# Patient Record
Sex: FEMALE | Race: WHITE | NOT HISPANIC OR LATINO | Employment: OTHER | URBAN - METROPOLITAN AREA
[De-identification: names, ages, dates, MRNs, and addresses within clinical notes are randomized per-mention and may not be internally consistent; named-entity substitution may affect disease eponyms.]

---

## 2017-03-31 ENCOUNTER — RECORDS - HEALTHEAST (OUTPATIENT)
Dept: LAB | Facility: CLINIC | Age: 72
End: 2017-03-31

## 2017-03-31 LAB
CHOLEST SERPL-MCNC: 163 MG/DL
FASTING STATUS PATIENT QL REPORTED: YES
HDLC SERPL-MCNC: 69 MG/DL
LDLC SERPL CALC-MCNC: 81 MG/DL
TRIGL SERPL-MCNC: 64 MG/DL

## 2017-04-26 ENCOUNTER — RECORDS - HEALTHEAST (OUTPATIENT)
Dept: ADMINISTRATIVE | Facility: OTHER | Age: 72
End: 2017-04-26

## 2017-05-23 ENCOUNTER — HOSPITAL ENCOUNTER (OUTPATIENT)
Dept: NUCLEAR MEDICINE | Facility: CLINIC | Age: 72
Discharge: HOME | End: 2017-05-23

## 2017-05-23 ENCOUNTER — COMMUNICATION - HEALTHEAST (OUTPATIENT)
Dept: TELEHEALTH | Facility: CLINIC | Age: 72
End: 2017-05-23

## 2017-05-23 ENCOUNTER — HOSPITAL ENCOUNTER (OUTPATIENT)
Dept: CARDIOLOGY | Facility: CLINIC | Age: 72
Discharge: HOME | End: 2017-05-23

## 2017-05-23 DIAGNOSIS — R07.9 CHEST PAIN: ICD-10-CM

## 2017-05-23 LAB
CV STRESS CURRENT BP HE: NORMAL
CV STRESS CURRENT HR HE: 100
CV STRESS CURRENT HR HE: 100
CV STRESS CURRENT HR HE: 104
CV STRESS CURRENT HR HE: 106
CV STRESS CURRENT HR HE: 108
CV STRESS CURRENT HR HE: 110
CV STRESS CURRENT HR HE: 111
CV STRESS CURRENT HR HE: 115
CV STRESS CURRENT HR HE: 116
CV STRESS CURRENT HR HE: 116
CV STRESS CURRENT HR HE: 118
CV STRESS CURRENT HR HE: 123
CV STRESS CURRENT HR HE: 126
CV STRESS CURRENT HR HE: 127
CV STRESS CURRENT HR HE: 128
CV STRESS CURRENT HR HE: 89
CV STRESS CURRENT HR HE: 98
CV STRESS CURRENT HR HE: 99
CV STRESS DEVIATION TIME HE: NORMAL
CV STRESS ECHO PERCENT HR HE: NORMAL
CV STRESS EXERCISE STAGE HE: NORMAL
CV STRESS FINAL RESTING BP HE: NORMAL
CV STRESS FINAL RESTING HR HE: 106
CV STRESS MAX HR HE: 128
CV STRESS MAX TREADMILL GRADE HE: 0
CV STRESS MAX TREADMILL SPEED HE: 1
CV STRESS PEAK DIA BP HE: NORMAL
CV STRESS PEAK SYS BP HE: NORMAL
CV STRESS PHASE HE: NORMAL
CV STRESS PROTOCOL HE: NORMAL
CV STRESS RESTING PT POSITION HE: NORMAL
CV STRESS RESTING PT POSITION HE: NORMAL
CV STRESS ST DEVIATION AMOUNT HE: NORMAL
CV STRESS ST DEVIATION ELEVATION HE: NORMAL
CV STRESS ST EVELATION AMOUNT HE: NORMAL
CV STRESS TEST TYPE HE: NORMAL
CV STRESS TOTAL STAGE TIME MIN 1 HE: NORMAL
NUC STRESS EJECTION FRACTION: 72 %
STRESS ECHO BASELINE BP: NORMAL
STRESS ECHO BASELINE HR: 100
STRESS ECHO CALCULATED PERCENT HR: 86 %
STRESS ECHO LAST STRESS BP: NORMAL
STRESS ECHO LAST STRESS HR: 128

## 2017-08-02 ENCOUNTER — RECORDS - HEALTHEAST (OUTPATIENT)
Dept: ADMINISTRATIVE | Facility: OTHER | Age: 72
End: 2017-08-02

## 2017-08-03 ENCOUNTER — HOSPITAL ENCOUNTER (OUTPATIENT)
Dept: RADIOLOGY | Facility: CLINIC | Age: 72
Discharge: HOME | End: 2017-08-03

## 2017-08-03 DIAGNOSIS — S99.929A FOOT TRAUMA: ICD-10-CM

## 2017-08-10 ENCOUNTER — HOSPITAL ENCOUNTER (OUTPATIENT)
Dept: MRI IMAGING | Facility: CLINIC | Age: 72
Discharge: HOME | End: 2017-08-10

## 2017-08-10 DIAGNOSIS — M54.9 BACK PAIN: ICD-10-CM

## 2017-10-02 ENCOUNTER — RECORDS - HEALTHEAST (OUTPATIENT)
Dept: LAB | Facility: CLINIC | Age: 72
End: 2017-10-02

## 2017-10-02 LAB
CHOLEST SERPL-MCNC: 175 MG/DL
FASTING STATUS PATIENT QL REPORTED: YES
HDLC SERPL-MCNC: 65 MG/DL
LDLC SERPL CALC-MCNC: 96 MG/DL
TRIGL SERPL-MCNC: 69 MG/DL

## 2018-04-02 ENCOUNTER — RECORDS - HEALTHEAST (OUTPATIENT)
Dept: LAB | Facility: CLINIC | Age: 73
End: 2018-04-02

## 2018-04-02 LAB
ALBUMIN SERPL-MCNC: 3.9 G/DL (ref 3.5–5)
ALP SERPL-CCNC: 72 U/L (ref 45–120)
ALT SERPL W P-5'-P-CCNC: 22 U/L (ref 0–45)
ANION GAP SERPL CALCULATED.3IONS-SCNC: 9 MMOL/L (ref 5–18)
AST SERPL W P-5'-P-CCNC: 22 U/L (ref 0–40)
BASOPHILS # BLD AUTO: 0 THOU/UL (ref 0–0.2)
BASOPHILS NFR BLD AUTO: 0 % (ref 0–2)
BILIRUB SERPL-MCNC: 0.8 MG/DL (ref 0–1)
BUN SERPL-MCNC: 12 MG/DL (ref 8–28)
CALCIUM SERPL-MCNC: 9.9 MG/DL (ref 8.5–10.5)
CHLORIDE BLD-SCNC: 99 MMOL/L (ref 98–107)
CHOLEST SERPL-MCNC: 165 MG/DL
CO2 SERPL-SCNC: 27 MMOL/L (ref 22–31)
CREAT SERPL-MCNC: 0.65 MG/DL (ref 0.6–1.1)
EOSINOPHIL # BLD AUTO: 0.1 THOU/UL (ref 0–0.4)
EOSINOPHIL NFR BLD AUTO: 1 % (ref 0–6)
ERYTHROCYTE [DISTWIDTH] IN BLOOD BY AUTOMATED COUNT: 13.1 % (ref 11–14.5)
FASTING STATUS PATIENT QL REPORTED: YES
GFR SERPL CREATININE-BSD FRML MDRD: >60 ML/MIN/1.73M2
GLUCOSE BLD-MCNC: 89 MG/DL (ref 70–125)
HCT VFR BLD AUTO: 42.2 % (ref 35–47)
HDLC SERPL-MCNC: 77 MG/DL
HGB BLD-MCNC: 14 G/DL (ref 12–16)
LDLC SERPL CALC-MCNC: 69 MG/DL
LYMPHOCYTES # BLD AUTO: 2.1 THOU/UL (ref 0.8–4.4)
LYMPHOCYTES NFR BLD AUTO: 26 % (ref 20–40)
MCH RBC QN AUTO: 33.7 PG (ref 27–34)
MCHC RBC AUTO-ENTMCNC: 33.2 G/DL (ref 32–36)
MCV RBC AUTO: 102 FL (ref 80–100)
MONOCYTES # BLD AUTO: 0.6 THOU/UL (ref 0–0.9)
MONOCYTES NFR BLD AUTO: 7 % (ref 2–10)
NEUTROPHILS # BLD AUTO: 5.1 THOU/UL (ref 2–7.7)
NEUTROPHILS NFR BLD AUTO: 65 % (ref 50–70)
PLATELET # BLD AUTO: 265 THOU/UL (ref 140–440)
PMV BLD AUTO: 11.3 FL (ref 8.5–12.5)
POTASSIUM BLD-SCNC: 4.2 MMOL/L (ref 3.5–5)
PROT SERPL-MCNC: 6.8 G/DL (ref 6–8)
RBC # BLD AUTO: 4.15 MILL/UL (ref 3.8–5.4)
SODIUM SERPL-SCNC: 135 MMOL/L (ref 136–145)
TRIGL SERPL-MCNC: 93 MG/DL
WBC: 7.9 THOU/UL (ref 4–11)

## 2018-04-11 ENCOUNTER — HOSPITAL ENCOUNTER (OUTPATIENT)
Dept: MAMMOGRAPHY | Facility: CLINIC | Age: 73
Discharge: HOME | End: 2018-04-11

## 2018-04-11 DIAGNOSIS — Z12.31 VISIT FOR SCREENING MAMMOGRAM: ICD-10-CM

## 2018-10-03 ENCOUNTER — RECORDS - HEALTHEAST (OUTPATIENT)
Dept: LAB | Facility: CLINIC | Age: 73
End: 2018-10-03

## 2018-10-03 LAB
ALBUMIN SERPL-MCNC: 4 G/DL (ref 3.5–5)
ALP SERPL-CCNC: 62 U/L (ref 45–120)
ALT SERPL W P-5'-P-CCNC: 25 U/L (ref 0–45)
ANION GAP SERPL CALCULATED.3IONS-SCNC: 12 MMOL/L (ref 5–18)
AST SERPL W P-5'-P-CCNC: 22 U/L (ref 0–40)
BILIRUB SERPL-MCNC: 0.5 MG/DL (ref 0–1)
BUN SERPL-MCNC: 14 MG/DL (ref 8–28)
CALCIUM SERPL-MCNC: 10.2 MG/DL (ref 8.5–10.5)
CHLORIDE BLD-SCNC: 99 MMOL/L (ref 98–107)
CHOLEST SERPL-MCNC: 165 MG/DL
CO2 SERPL-SCNC: 24 MMOL/L (ref 22–31)
CREAT SERPL-MCNC: 0.68 MG/DL (ref 0.6–1.1)
FASTING STATUS PATIENT QL REPORTED: NORMAL
GFR SERPL CREATININE-BSD FRML MDRD: >60 ML/MIN/1.73M2
GLUCOSE BLD-MCNC: 84 MG/DL (ref 70–125)
HDLC SERPL-MCNC: 75 MG/DL
LDLC SERPL CALC-MCNC: 76 MG/DL
POTASSIUM BLD-SCNC: 4.1 MMOL/L (ref 3.5–5)
PROT SERPL-MCNC: 6.5 G/DL (ref 6–8)
SODIUM SERPL-SCNC: 135 MMOL/L (ref 136–145)
TRIGL SERPL-MCNC: 70 MG/DL

## 2019-04-03 ENCOUNTER — RECORDS - HEALTHEAST (OUTPATIENT)
Dept: LAB | Facility: CLINIC | Age: 74
End: 2019-04-03

## 2019-04-03 LAB
ALBUMIN SERPL-MCNC: 4.1 G/DL (ref 3.5–5)
ALP SERPL-CCNC: 63 U/L (ref 45–120)
ALT SERPL W P-5'-P-CCNC: 28 U/L (ref 0–45)
ANION GAP SERPL CALCULATED.3IONS-SCNC: 10 MMOL/L (ref 5–18)
AST SERPL W P-5'-P-CCNC: 21 U/L (ref 0–40)
BILIRUB SERPL-MCNC: 0.6 MG/DL (ref 0–1)
BUN SERPL-MCNC: 9 MG/DL (ref 8–28)
CALCIUM SERPL-MCNC: 10.3 MG/DL (ref 8.5–10.5)
CHLORIDE BLD-SCNC: 100 MMOL/L (ref 98–107)
CHOLEST SERPL-MCNC: 171 MG/DL
CO2 SERPL-SCNC: 26 MMOL/L (ref 22–31)
CREAT SERPL-MCNC: 0.69 MG/DL (ref 0.6–1.1)
FASTING STATUS PATIENT QL REPORTED: YES
GFR SERPL CREATININE-BSD FRML MDRD: >60 ML/MIN/1.73M2
GLUCOSE BLD-MCNC: 89 MG/DL (ref 70–125)
HDLC SERPL-MCNC: 82 MG/DL
LDLC SERPL CALC-MCNC: 71 MG/DL
POTASSIUM BLD-SCNC: 4.4 MMOL/L (ref 3.5–5)
PROT SERPL-MCNC: 6.6 G/DL (ref 6–8)
SODIUM SERPL-SCNC: 136 MMOL/L (ref 136–145)
TRIGL SERPL-MCNC: 90 MG/DL
TSH SERPL DL<=0.005 MIU/L-ACNC: 1.57 UIU/ML (ref 0.3–5)

## 2019-10-07 ENCOUNTER — RECORDS - HEALTHEAST (OUTPATIENT)
Dept: LAB | Facility: CLINIC | Age: 74
End: 2019-10-07

## 2019-10-07 LAB
ALBUMIN SERPL-MCNC: 4.4 G/DL (ref 3.5–5)
ALP SERPL-CCNC: 73 U/L (ref 45–120)
ALT SERPL W P-5'-P-CCNC: 33 U/L (ref 0–45)
AST SERPL W P-5'-P-CCNC: 32 U/L (ref 0–40)
BILIRUB DIRECT SERPL-MCNC: 0.2 MG/DL
BILIRUB SERPL-MCNC: 0.4 MG/DL (ref 0–1)
CHOLEST SERPL-MCNC: 173 MG/DL
FASTING STATUS PATIENT QL REPORTED: YES
HDLC SERPL-MCNC: 84 MG/DL
LDLC SERPL CALC-MCNC: 70 MG/DL
PROT SERPL-MCNC: 7.3 G/DL (ref 6–8)
TRIGL SERPL-MCNC: 97 MG/DL
TSH SERPL DL<=0.005 MIU/L-ACNC: 1.55 UIU/ML (ref 0.3–5)

## 2020-04-06 ENCOUNTER — RECORDS - HEALTHEAST (OUTPATIENT)
Dept: LAB | Facility: CLINIC | Age: 75
End: 2020-04-06

## 2020-04-06 LAB
ALBUMIN SERPL-MCNC: 4.1 G/DL (ref 3.5–5)
ALP SERPL-CCNC: 50 U/L (ref 45–120)
ALT SERPL W P-5'-P-CCNC: 24 U/L (ref 0–45)
ANION GAP SERPL CALCULATED.3IONS-SCNC: 11 MMOL/L (ref 5–18)
AST SERPL W P-5'-P-CCNC: 24 U/L (ref 0–40)
BASOPHILS # BLD AUTO: 0 THOU/UL (ref 0–0.2)
BASOPHILS NFR BLD AUTO: 0 % (ref 0–2)
BILIRUB SERPL-MCNC: 0.7 MG/DL (ref 0–1)
BUN SERPL-MCNC: 6 MG/DL (ref 8–28)
CALCIUM SERPL-MCNC: 9.8 MG/DL (ref 8.5–10.5)
CHLORIDE BLD-SCNC: 92 MMOL/L (ref 98–107)
CHOLEST SERPL-MCNC: 145 MG/DL
CO2 SERPL-SCNC: 24 MMOL/L (ref 22–31)
CREAT SERPL-MCNC: 0.67 MG/DL (ref 0.6–1.1)
EOSINOPHIL # BLD AUTO: 0 THOU/UL (ref 0–0.4)
EOSINOPHIL NFR BLD AUTO: 0 % (ref 0–6)
ERYTHROCYTE [DISTWIDTH] IN BLOOD BY AUTOMATED COUNT: 13.1 % (ref 11–14.5)
FASTING STATUS PATIENT QL REPORTED: YES
GFR SERPL CREATININE-BSD FRML MDRD: >60 ML/MIN/1.73M2
GLUCOSE BLD-MCNC: 99 MG/DL (ref 70–125)
HCT VFR BLD AUTO: 40 % (ref 35–47)
HDLC SERPL-MCNC: 64 MG/DL
HGB BLD-MCNC: 13.6 G/DL (ref 12–16)
LDLC SERPL CALC-MCNC: 69 MG/DL
LYMPHOCYTES # BLD AUTO: 2 THOU/UL (ref 0.8–4.4)
LYMPHOCYTES NFR BLD AUTO: 27 % (ref 20–40)
MCH RBC QN AUTO: 33 PG (ref 27–34)
MCHC RBC AUTO-ENTMCNC: 34 G/DL (ref 32–36)
MCV RBC AUTO: 97 FL (ref 80–100)
MONOCYTES # BLD AUTO: 0.5 THOU/UL (ref 0–0.9)
MONOCYTES NFR BLD AUTO: 7 % (ref 2–10)
NEUTROPHILS # BLD AUTO: 5 THOU/UL (ref 2–7.7)
NEUTROPHILS NFR BLD AUTO: 65 % (ref 50–70)
PLATELET # BLD AUTO: 295 THOU/UL (ref 140–440)
PMV BLD AUTO: 11 FL (ref 8.5–12.5)
POTASSIUM BLD-SCNC: 4 MMOL/L (ref 3.5–5)
PROT SERPL-MCNC: 6.5 G/DL (ref 6–8)
RBC # BLD AUTO: 4.12 MILL/UL (ref 3.8–5.4)
SODIUM SERPL-SCNC: 127 MMOL/L (ref 136–145)
TRIGL SERPL-MCNC: 59 MG/DL
WBC: 7.6 THOU/UL (ref 4–11)

## 2020-10-09 ENCOUNTER — RECORDS - HEALTHEAST (OUTPATIENT)
Dept: ADMINISTRATIVE | Facility: OTHER | Age: 75
End: 2020-10-09

## 2020-10-09 ENCOUNTER — RECORDS - HEALTHEAST (OUTPATIENT)
Dept: LAB | Facility: CLINIC | Age: 75
End: 2020-10-09

## 2020-10-09 LAB
ANION GAP SERPL CALCULATED.3IONS-SCNC: 9 MMOL/L (ref 5–18)
BUN SERPL-MCNC: 16 MG/DL (ref 8–28)
CALCIUM SERPL-MCNC: 10.3 MG/DL (ref 8.5–10.5)
CHLORIDE BLD-SCNC: 100 MMOL/L (ref 98–107)
CHOLEST SERPL-MCNC: 173 MG/DL
CO2 SERPL-SCNC: 27 MMOL/L (ref 22–31)
CREAT SERPL-MCNC: 0.72 MG/DL (ref 0.6–1.1)
FASTING STATUS PATIENT QL REPORTED: NORMAL
GFR SERPL CREATININE-BSD FRML MDRD: >60 ML/MIN/1.73M2
GLUCOSE BLD-MCNC: 92 MG/DL (ref 70–125)
HDLC SERPL-MCNC: 73 MG/DL
LDLC SERPL CALC-MCNC: 85 MG/DL
POTASSIUM BLD-SCNC: 4.5 MMOL/L (ref 3.5–5)
SODIUM SERPL-SCNC: 136 MMOL/L (ref 136–145)
TRIGL SERPL-MCNC: 74 MG/DL

## 2020-11-03 ENCOUNTER — RECORDS - HEALTHEAST (OUTPATIENT)
Dept: ADMINISTRATIVE | Facility: OTHER | Age: 75
End: 2020-11-03

## 2021-01-29 ENCOUNTER — COMMUNICATION - HEALTHEAST (OUTPATIENT)
Dept: SCHEDULING | Facility: CLINIC | Age: 76
End: 2021-01-29

## 2021-02-01 ENCOUNTER — AMBULATORY - HEALTHEAST (OUTPATIENT)
Dept: ONCOLOGY | Facility: CLINIC | Age: 76
End: 2021-02-01

## 2021-02-01 DIAGNOSIS — C34.31 MALIGNANT NEOPLASM OF LOWER LOBE, RIGHT BRONCHUS OR LUNG (H): ICD-10-CM

## 2021-02-01 DIAGNOSIS — R16.0 LIVER MASS: ICD-10-CM

## 2021-02-02 ENCOUNTER — COMMUNICATION - HEALTHEAST (OUTPATIENT)
Dept: ONCOLOGY | Facility: CLINIC | Age: 76
End: 2021-02-02

## 2021-02-04 ENCOUNTER — HOSPITAL ENCOUNTER (OUTPATIENT)
Dept: PET IMAGING | Facility: HOSPITAL | Age: 76
Discharge: HOME | End: 2021-02-04
Attending: INTERNAL MEDICINE

## 2021-02-04 DIAGNOSIS — C34.31 MALIGNANT NEOPLASM OF LOWER LOBE, RIGHT BRONCHUS OR LUNG (H): ICD-10-CM

## 2021-02-04 DIAGNOSIS — R16.0 LIVER MASS: ICD-10-CM

## 2021-02-04 LAB — GLUCOSE BLDC GLUCOMTR-MCNC: 134 MG/DL (ref 70–139)

## 2021-02-04 ASSESSMENT — MIFFLIN-ST. JEOR: SCORE: 1189.42

## 2021-02-05 ENCOUNTER — RECORDS - HEALTHEAST (OUTPATIENT)
Dept: ADMINISTRATIVE | Facility: OTHER | Age: 76
End: 2021-02-05

## 2021-02-08 ENCOUNTER — OFFICE VISIT - HEALTHEAST (OUTPATIENT)
Dept: ONCOLOGY | Facility: CLINIC | Age: 76
End: 2021-02-08

## 2021-02-08 DIAGNOSIS — R91.8 PULMONARY NODULES: ICD-10-CM

## 2021-02-08 DIAGNOSIS — R91.8 MASS OF UPPER LOBE OF RIGHT LUNG: ICD-10-CM

## 2021-02-08 ASSESSMENT — MIFFLIN-ST. JEOR: SCORE: 1175.81

## 2021-02-09 ENCOUNTER — COMMUNICATION - HEALTHEAST (OUTPATIENT)
Dept: ONCOLOGY | Facility: CLINIC | Age: 76
End: 2021-02-09

## 2021-02-19 ENCOUNTER — COMMUNICATION - HEALTHEAST (OUTPATIENT)
Dept: ONCOLOGY | Facility: CLINIC | Age: 76
End: 2021-02-19

## 2021-03-09 ENCOUNTER — RECORDS - HEALTHEAST (OUTPATIENT)
Dept: ADMINISTRATIVE | Facility: OTHER | Age: 76
End: 2021-03-09

## 2021-03-10 ENCOUNTER — COMMUNICATION - HEALTHEAST (OUTPATIENT)
Dept: ONCOLOGY | Facility: CLINIC | Age: 76
End: 2021-03-10

## 2021-03-12 ENCOUNTER — OFFICE VISIT - HEALTHEAST (OUTPATIENT)
Dept: CARDIOLOGY | Facility: CLINIC | Age: 76
End: 2021-03-12

## 2021-03-12 DIAGNOSIS — I25.10 CAD (CORONARY ARTERY DISEASE): ICD-10-CM

## 2021-03-12 ASSESSMENT — MIFFLIN-ST. JEOR: SCORE: 1180.35

## 2021-03-16 ENCOUNTER — COMMUNICATION - HEALTHEAST (OUTPATIENT)
Dept: CARDIOLOGY | Facility: CLINIC | Age: 76
End: 2021-03-16

## 2021-03-16 DIAGNOSIS — I25.10 CAD (CORONARY ARTERY DISEASE): ICD-10-CM

## 2021-03-19 ENCOUNTER — AMBULATORY - HEALTHEAST (OUTPATIENT)
Dept: LAB | Facility: CLINIC | Age: 76
End: 2021-03-19

## 2021-03-19 ENCOUNTER — HOSPITAL ENCOUNTER (OUTPATIENT)
Dept: CARDIOLOGY | Facility: CLINIC | Age: 76
Discharge: HOME | End: 2021-03-19
Attending: INTERNAL MEDICINE

## 2021-03-19 DIAGNOSIS — I25.10 CAD (CORONARY ARTERY DISEASE): ICD-10-CM

## 2021-03-19 LAB
CHOLEST SERPL-MCNC: 160 MG/DL
FASTING STATUS PATIENT QL REPORTED: NO
HDLC SERPL-MCNC: 74 MG/DL
LDLC SERPL CALC-MCNC: 70 MG/DL
TRIGL SERPL-MCNC: 82 MG/DL

## 2021-03-21 ENCOUNTER — COMMUNICATION - HEALTHEAST (OUTPATIENT)
Dept: CARDIOLOGY | Facility: CLINIC | Age: 76
End: 2021-03-21

## 2021-03-24 ENCOUNTER — COMMUNICATION - HEALTHEAST (OUTPATIENT)
Dept: CARDIOLOGY | Facility: CLINIC | Age: 76
End: 2021-03-24

## 2021-03-26 ENCOUNTER — COMMUNICATION - HEALTHEAST (OUTPATIENT)
Dept: CARDIOLOGY | Facility: CLINIC | Age: 76
End: 2021-03-26

## 2021-04-06 ENCOUNTER — COMMUNICATION - HEALTHEAST (OUTPATIENT)
Dept: ONCOLOGY | Facility: CLINIC | Age: 76
End: 2021-04-06

## 2021-04-06 ENCOUNTER — COMMUNICATION - HEALTHEAST (OUTPATIENT)
Dept: CARDIOLOGY | Facility: CLINIC | Age: 76
End: 2021-04-06

## 2021-04-08 ENCOUNTER — AMBULATORY - HEALTHEAST (OUTPATIENT)
Dept: CARDIOLOGY | Facility: CLINIC | Age: 76
End: 2021-04-08

## 2021-04-09 ENCOUNTER — COMMUNICATION - HEALTHEAST (OUTPATIENT)
Dept: ONCOLOGY | Facility: CLINIC | Age: 76
End: 2021-04-09

## 2021-04-12 ENCOUNTER — RECORDS - HEALTHEAST (OUTPATIENT)
Dept: RADIOLOGY | Facility: CLINIC | Age: 76
End: 2021-04-12

## 2021-05-04 ENCOUNTER — TRANSFERRED RECORDS (OUTPATIENT)
Dept: HEALTH INFORMATION MANAGEMENT | Facility: CLINIC | Age: 76
End: 2021-05-04

## 2021-05-04 ENCOUNTER — RECORDS - HEALTHEAST (OUTPATIENT)
Dept: ADMINISTRATIVE | Facility: OTHER | Age: 76
End: 2021-05-04

## 2021-05-30 ENCOUNTER — RECORDS - HEALTHEAST (OUTPATIENT)
Dept: ADMINISTRATIVE | Facility: CLINIC | Age: 76
End: 2021-05-30

## 2021-05-31 VITALS — WEIGHT: 162 LBS

## 2021-06-04 ENCOUNTER — HOSPITAL ENCOUNTER (OUTPATIENT)
Dept: CT IMAGING | Facility: CLINIC | Age: 76
Setting detail: RADIATION/ONCOLOGY SERIES
Discharge: STILL ADMITTED OR ADMINISTRATIVE | End: 2021-06-04
Attending: INTERNAL MEDICINE

## 2021-06-04 DIAGNOSIS — R91.8 PULMONARY NODULES: ICD-10-CM

## 2021-06-04 LAB
CREAT BLD-MCNC: 0.7 MG/DL (ref 0.6–1.1)
GFR SERPL CREATININE-BSD FRML MDRD: >60 ML/MIN/1.73M2

## 2021-06-05 VITALS
SYSTOLIC BLOOD PRESSURE: 120 MMHG | BODY MASS INDEX: 25.33 KG/M2 | HEART RATE: 68 BPM | RESPIRATION RATE: 16 BRPM | HEIGHT: 65 IN | WEIGHT: 152 LBS | DIASTOLIC BLOOD PRESSURE: 76 MMHG

## 2021-06-05 VITALS — BODY MASS INDEX: 25.66 KG/M2 | WEIGHT: 154 LBS | HEIGHT: 65 IN

## 2021-06-05 VITALS
WEIGHT: 151 LBS | SYSTOLIC BLOOD PRESSURE: 141 MMHG | BODY MASS INDEX: 25.16 KG/M2 | HEIGHT: 65 IN | HEART RATE: 99 BPM | TEMPERATURE: 98.2 F | OXYGEN SATURATION: 93 % | DIASTOLIC BLOOD PRESSURE: 72 MMHG

## 2021-06-07 ENCOUNTER — OFFICE VISIT - HEALTHEAST (OUTPATIENT)
Dept: ONCOLOGY | Facility: CLINIC | Age: 76
End: 2021-06-07

## 2021-06-07 ENCOUNTER — AMBULATORY - HEALTHEAST (OUTPATIENT)
Dept: INFUSION THERAPY | Facility: CLINIC | Age: 76
End: 2021-06-07

## 2021-06-07 DIAGNOSIS — R91.8 PULMONARY NODULES: ICD-10-CM

## 2021-06-07 DIAGNOSIS — R91.8 MASS OF UPPER LOBE OF RIGHT LUNG: ICD-10-CM

## 2021-06-07 LAB
ALBUMIN SERPL-MCNC: 3.9 G/DL (ref 3.5–5)
ALP SERPL-CCNC: 56 U/L (ref 45–120)
ALT SERPL W P-5'-P-CCNC: 19 U/L (ref 0–45)
ANION GAP SERPL CALCULATED.3IONS-SCNC: 5 MMOL/L (ref 5–18)
AST SERPL W P-5'-P-CCNC: 21 U/L (ref 0–40)
BASOPHILS # BLD AUTO: 0 THOU/UL (ref 0–0.2)
BASOPHILS NFR BLD AUTO: 1 % (ref 0–2)
BILIRUB SERPL-MCNC: 0.5 MG/DL (ref 0–1)
BUN SERPL-MCNC: 13 MG/DL (ref 8–28)
CALCIUM SERPL-MCNC: 10.1 MG/DL (ref 8.5–10.5)
CHLORIDE BLD-SCNC: 106 MMOL/L (ref 98–107)
CO2 SERPL-SCNC: 27 MMOL/L (ref 22–31)
CREAT SERPL-MCNC: 0.72 MG/DL (ref 0.6–1.1)
EOSINOPHIL # BLD AUTO: 0.1 THOU/UL (ref 0–0.4)
EOSINOPHIL NFR BLD AUTO: 1 % (ref 0–6)
ERYTHROCYTE [DISTWIDTH] IN BLOOD BY AUTOMATED COUNT: 13.2 % (ref 11–14.5)
GFR SERPL CREATININE-BSD FRML MDRD: >60 ML/MIN/1.73M2
GLUCOSE BLD-MCNC: 100 MG/DL (ref 70–125)
HCT VFR BLD AUTO: 40.7 % (ref 35–47)
HGB BLD-MCNC: 13.3 G/DL (ref 12–16)
IMM GRANULOCYTES # BLD: 0 THOU/UL
IMM GRANULOCYTES NFR BLD: 0 %
LYMPHOCYTES # BLD AUTO: 1.7 THOU/UL (ref 0.8–4.4)
LYMPHOCYTES NFR BLD AUTO: 28 % (ref 20–40)
MCH RBC QN AUTO: 32.3 PG (ref 27–34)
MCHC RBC AUTO-ENTMCNC: 32.7 G/DL (ref 32–36)
MCV RBC AUTO: 99 FL (ref 80–100)
MONOCYTES # BLD AUTO: 0.4 THOU/UL (ref 0–0.9)
MONOCYTES NFR BLD AUTO: 7 % (ref 2–10)
NEUTROPHILS # BLD AUTO: 3.7 THOU/UL (ref 2–7.7)
NEUTROPHILS NFR BLD AUTO: 63 % (ref 50–70)
PLATELET # BLD AUTO: 246 THOU/UL (ref 140–440)
PMV BLD AUTO: 10.9 FL (ref 8.5–12.5)
POTASSIUM BLD-SCNC: 4.4 MMOL/L (ref 3.5–5)
PROT SERPL-MCNC: 6.7 G/DL (ref 6–8)
RBC # BLD AUTO: 4.12 MILL/UL (ref 3.8–5.4)
SODIUM SERPL-SCNC: 138 MMOL/L (ref 136–145)
WBC: 5.9 THOU/UL (ref 4–11)

## 2021-06-14 NOTE — TELEPHONE ENCOUNTER
New Patient Oncology Nurse Navigator Note     Referring provider: Dr. Sky Mccarty spoke with Dr. Barnhart     Referring Clinic/Organization: [facility]     Referred to (specialty): Medical Oncology    Requested provider (if applicable): NA     Date Referral Received: 2/2/2021     Evaluation for : Lung nodule     Clinical History (per Nurse review of records provided):  Patient presented to [facility] ED on 1/29/2021 with dyspnea on exertion and chest tightness. A CTA CAP showed a a spiculated 1.7 cm right upper lobe pulmonary nodule, enlarged pulmonary nodules, severe emphysema and multiple hepatic lesions. Patient was referred to oncology for outpatient PET/CT and consult.  No prior history of cancer per patient.      Clinical Assessment / Barriers to Care (Per Nurse): Family will provide transportation       Records Location (Care Everywhere, Media, etc.):    *CTA CAP 1/29/2021 at [facility]  *CT chest 4/23/2019 at [facility]  *PET/CT scheduled on 2/4/2021 at [facility]    Records Needed: NA     Additional testing needed prior to consult: PET/CT    I called Shantel, introduced myself and relayed the purpose of my call. She prefers care at [facility]. Shantel has been scheduled for a consult with Dr. Barnhart on Monday, Feb 8th 2021 at 11:00 am, 10:35 am arrival. She was informed of the visitor restrictions in place due to COVID-19 and she will arrive unaccompanied. She requests her sister in law, Tyesha, join the appointment by phone. Appt note was updated.  PET/CT has been scheduled with the assistance of centralized scheduling on Thursday, Feb 4th 2021, 12:30 pm check in. Pt was provided appt details, including date, time, location and prep.  I emailed the new patient intake, medication list, consent to communicate, appt letter and MD villasenor to [email or web address] per her request. I shared what she can expect with her appt. I provided my contact information and welcomed calls.

## 2021-06-15 ENCOUNTER — VIRTUAL VISIT (OUTPATIENT)
Dept: PHARMACY | Facility: PHYSICIAN GROUP | Age: 76
End: 2021-06-15

## 2021-06-15 DIAGNOSIS — I11.9 HYPERTENSIVE HEART DISEASE WITHOUT HEART FAILURE: ICD-10-CM

## 2021-06-15 DIAGNOSIS — E78.5 HYPERLIPIDEMIA LDL GOAL <100: ICD-10-CM

## 2021-06-15 DIAGNOSIS — M81.0 OSTEOPOROSIS, UNSPECIFIED OSTEOPOROSIS TYPE, UNSPECIFIED PATHOLOGICAL FRACTURE PRESENCE: ICD-10-CM

## 2021-06-15 DIAGNOSIS — J44.9 CHRONIC OBSTRUCTIVE PULMONARY DISEASE, UNSPECIFIED COPD TYPE (H): Primary | ICD-10-CM

## 2021-06-15 DIAGNOSIS — Z71.6 ENCOUNTER FOR SMOKING CESSATION COUNSELING: ICD-10-CM

## 2021-06-15 DIAGNOSIS — Z78.9 TAKES DIETARY SUPPLEMENTS: ICD-10-CM

## 2021-06-15 DIAGNOSIS — J30.2 SEASONAL ALLERGIC RHINITIS, UNSPECIFIED TRIGGER: ICD-10-CM

## 2021-06-15 DIAGNOSIS — A69.1: ICD-10-CM

## 2021-06-15 PROCEDURE — 99607 MTMS BY PHARM ADDL 15 MIN: CPT | Performed by: PHARMACIST

## 2021-06-15 PROCEDURE — 99605 MTMS BY PHARM NP 15 MIN: CPT | Performed by: PHARMACIST

## 2021-06-15 RX ORDER — FLUTICASONE PROPIONATE 50 MCG
2 SPRAY, SUSPENSION (ML) NASAL PRN
COMMUNITY
Start: 2021-04-06

## 2021-06-15 RX ORDER — NITROGLYCERIN 0.1MG/HR
1 PATCH, TRANSDERMAL 24 HOURS TRANSDERMAL DAILY
COMMUNITY
Start: 2021-03-16 | End: 2021-07-29

## 2021-06-15 RX ORDER — CHLORAL HYDRATE 500 MG
1 CAPSULE ORAL DAILY
COMMUNITY
End: 2023-08-07

## 2021-06-15 RX ORDER — ACETAMINOPHEN 500 MG
500-1000 TABLET ORAL EVERY 8 HOURS PRN
COMMUNITY
Start: 2021-02-01

## 2021-06-15 RX ORDER — ALBUTEROL SULFATE 90 UG/1
2 AEROSOL, METERED RESPIRATORY (INHALATION)
COMMUNITY
Start: 2021-04-05 | End: 2023-08-30

## 2021-06-15 RX ORDER — TIOTROPIUM BROMIDE 18 UG/1
18 CAPSULE ORAL; RESPIRATORY (INHALATION) DAILY
COMMUNITY
End: 2023-04-10

## 2021-06-15 RX ORDER — LISINOPRIL 5 MG/1
2.5 TABLET ORAL DAILY
COMMUNITY
Start: 2021-05-15

## 2021-06-15 RX ORDER — MULTIVITAMIN
1 TABLET ORAL DAILY
COMMUNITY
End: 2021-06-15

## 2021-06-15 RX ORDER — IBANDRONATE SODIUM 150 MG/1
150 TABLET, FILM COATED ORAL
COMMUNITY

## 2021-06-15 RX ORDER — ATORVASTATIN CALCIUM 40 MG/1
40 TABLET, FILM COATED ORAL AT BEDTIME
COMMUNITY

## 2021-06-15 NOTE — CONSULTS
[facility] Hematology and Oncology Consult Note    Patient: Shantel Romero  MRN: [id number]  Date of Service: 02/08/2021      Reason for Visit:    1.  Right-sided lung nodule    Assessment/Plan:    1.  Lung nodule: This has been slowly enlarging since April 2019.  Little bit more of a solid component now.  Personally reviewed the patient's CT scan as well as her PET scan.  These show a very slowly enlarging right lung mass.  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  We discussed three treatment approaches.  The first being surgery, the second to radiosurgery, and the third observation.  For now she chose observation.  I told her that once the tumor reaches 2.5 cm in solid diameter then we can consider radiosurgery.  Overall, I think she has a very good prognosis.  Multiple questions were answered.  We will see her back in clinic in 6 months with repeat imaging.    ECOG Performance   ECOG Performance Status: 0    Distress Assessment  Distress Assessment Score: Extreme distress(visit today)    Problem List:    1. Pulmonary nodules  HM1(CBC and Differential)    Comprehensive Metabolic Panel    CT Chest With Contrast   2. Mass of upper lobe of right lung       Staging History:    Cancer Staging  No matching staging information was found for the patient.    History:    Cosme is a 75-year-old woman who was recently admitted to the hospital for some chest discomfort.  She had imaging done which showed a right-sided lung nodule.  This was initially seen back in 2019.  Control enlarging.  She is not having any cough, chest pain, or shortness of breath.  Energy and appetite are okay.  No areas of pain.    Past History:    Past Medical History:   Diagnosis Date     COPD (chronic obstructive pulmonary disease) (H)      Coronary artery disease due to lipid rich plaque 02/2021    Abnormal coronary CTA      Family history of myocardial infarction     mother under age 60's  CVA and MI     High cholesterol      Hypertension      PVC's (premature ventricular contractions)      Syncope 2012     Tubular adenoma of colon     Family History   Problem Relation Age of Onset     Lung cancer Father         lung     Breast cancer Paternal Aunt      Stroke Mother      Heart attack Mother       [unfilled] Social History     Socioeconomic History     Marital status:      Spouse name: Not on file     Number of children: Not on file     Years of education: Not on file     Highest education level: Not on file   Occupational History     Not on file   Social Needs     Financial resource strain: Not on file     Food insecurity     Worry: Not on file     Inability: Not on file     Transportation needs     Medical: Not on file     Non-medical: Not on file   Tobacco Use     Smoking status: Current Some Day Smoker     Packs/day: 2.00     Years: 45.00     Pack years: 90.00   Substance and Sexual Activity     Alcohol use: Not Currently     Drug use: Not Currently     Sexual activity: Not Currently   Lifestyle     Physical activity     Days per week: Not on file     Minutes per session: Not on file     Stress: Not on file   Relationships     Social connections     Talks on phone: Not on file     Gets together: Not on file     Attends Zoroastrianism service: Not on file     Active member of club or organization: Not on file     Attends meetings of clubs or organizations: Not on file     Relationship status: Not on file     Intimate partner violence     Fear of current or ex partner: Not on file     Emotionally abused: Not on file     Physically abused: Not on file     Forced sexual activity: Not on file   Other Topics Concern     Not on file   Social History Narrative     Not on file        Allergies:    No Known Allergies    Review of Systems:    General  General (WDL): Exceptions to WDL  Fatigue: Yes - Recent (Less than 3 months)  Fever:  None  Generalized Muscle Weakness: None  Weight Loss: Yes - Recent (Greater than 3 months)  ENT  ENT (WDL): Exceptions to WDL  Vertigo (Dizziness): Yes - Chronic (Greater than 3 months)  Changes in vision: Yes - Recent (Less than 3 months)  Glasses or Contacts: Yes - Chronic (Greater than 3 months)  Hearing loss: None  Hearing Aids: None  Tinnitus: None  Pain/Pressure in ears: None  Sinus Congestion/Drainage: Yes - Chronic (Greater than 3 months)  Hoarseness: None  Sore Throat: None  Dental Problems: Yes - Chronic (Greater than 3 months)  Dentures: Yes - Chronic (Greater than 3 months)(removable upper dentures)  Respiratory  Respiratory (WDL): Exceptions to WDL  Dyspnea: Yes - Recent (Less than 3 months)  hemoptysis: None  Is patient on O2?: None  Cough: Yes - Chronic (Greater than 3 months)  Non-Cardiac Chest Pain: None  Cardiovascular  Cardiovascular (WDL): Exceptions to WDL  Palpitations: Yes - Recent (Less than 3 months)  Edema Limbs: Yes - Recent (Less than 3 months)  Irregular Heart Beat: Yes - Recent (Less than 3 months)  Chest Pain: Yes - Recent (Less than 3 months)  Lightheadedness: Yes - Recent (Less than 3 months)  Endocrine  Endocrine (WDL): Exceptions to WDL  Heat Intolerance: None  Excessive Thirst: None  Cold Intolerance: Yes - Chronic (Greater than 3 months)  Excessive Urination: Yes - Chronic (Greater than 3 months)  Hotflashes: None  Gastrointestinal  Gastrointestinal (WDL): Exceptions to WDL  Difficulty Swallowing: None  Heartburn: None  Constipation: Yes - Chronic (Greater than 3 months)  Yellowish skin and/or eyes: None  Blood from rectum: None  Nausea and Vomiting: None  Abdominal Pain: None  Diarrhea: None  Have had black or tan stools?: None  Hemorrhoids: Yes - Chronic (Greater than 3 months)  Poor Appetite: Yes- Recent (Less than 3 months)  Musculoskeletal  Musculoskeletal (WDL): Exceptions to WDL  Range of Motion Limitation: None  Joint pain: None  Back Pain: Yes - Chronic (Greater than 3  "months)  Activity Assistance: None  Difficulty to lie flat for more than 30 minutes: None  Pain interfering with walking: Yes - Chronic (Greater than 3 months)  Muscle pain or stiffness: None  Recent fall: Yes - Chronic (Greater than 3 months)  Assistive device: None  Neurological  Neurological (WDL): Exceptions to WDL  History of LOC?: Yes - Chronic (Greater than 3 months)  Headaches: None  Difficulty walking: Yes - Recent (Less than 3 months)  Difficulty with speech: None  Difficulty with memory: None  Vertigo (Dizziness): Yes - Chronic (Greater than 3 months)  Dominant Hand: Right  Seizures: None  Difficulty with Balance: Yes - Recent (Less than 3 months)  Numbness and/or tingling: Yes - Chronic (Greater than 3 months)  Psychological/Emotional  Psychological/Emotional (WDL): Exceptions to WDL  Depression: None  Insomnia: None  Panic attacks: Yes - Chronic (Greater than 3 months)  Anxiety: Yes - Chronic (Greater than 3 months)  Daytime sleepiness: None  Hallucinations: None  Psychosocial needs or not coping well: None  Hematological/Lymphatic  Hematological/Lymphatic (WDL): All hematological/lymphatic elements are within defined limits  Dermatological  Dermatologic (WDL): All dermatological elements are within defined limits  Genitourinary/Reproductive  Genitourinary/Reproductive (WDL): Exceptions to WDL  Urinary Frequency: Yes - Chronic (Greater than 3 months)  Urinary Incontinence: None  Painful urination: None  Urination more than 2 times a night: Yes - Chronic (Greater than 3 months)  Urinary Urgency: Yes - Recent (Less than 3 months)  Difficulty Initiating Urine Stream: None  Blood in urine: None  Sensation of incomplete emptying of bladder: None  Reproductive (Females only)     Pain  Currently in Pain: No/denies  Pain Frequency: Intermittent  Location: chest  Pain Characteristics : Tightness;Other (Comment)( Fri 1/29 \"rubber band around lower chest\" 8-9 at that time)  Pain Intervention(s): Other " "(Comment)  Response to Interventions: improved with not wearing bra    Physical Exam:    Recent Vitals 2/8/2021   Height 5' 5\"   Weight 151 lbs   BSA (m2) 1.77 m2   /72   Pulse 99   Temp 98.2   Temp src 1   SpO2 93   Some recent data might be hidden     General: patient appears stated age of 75 y.o.. Nontoxic and in no distress.   HEENT: Head: atraumatic, normocephalic. Sclerae anicteric.  Chest:  Normal respiratory effort  Cardiac:  No edema.   Abdomen: abdomen is non-distended  Extremities: normal tone and muscle bulk.  Skin: no lesions or rash. Warm and dry.   CNS: alert and oriented. Grossly non-focal.   Psychiatric: normal mood and affect.     Lab Results:    Recent Results (from the past 168 hour(s))   POCT Glucose    Specimen: Capillary; Blood   Result Value Ref Range    Glucose 134 70 - 139 mg/dL     Imaging Results:    Echo Complete    Result Date: 1/30/2021    No previous study for comparison.   Normal left ventricular size. Mild left ventricular hypertrophy   Left ventricle ejection fraction is normal. The calculated left ventricular ejection fraction is 67%.   Possible mild right ventricular enlargement.   Normal right ventricular systolic function.   Mild right atrial enlargement.   Aortic valve sclerosis without evidence for significant aortic stenosis. Mild aortic insufficiency.   Trace pericardial effusion suggested      Ct Chest Over Read    Result Date: 2/1/2021  EXAM: OVERREAD: DETAILED [facility] RADIOLOGY EXTRACARDIAC OVERREAD OF CARDIAC CT LOCATION: [facility] DATE/TIME: 2/1/2021 12:01 PM INDICATION: Chest pain, chronic, high prob of CAD TECHNIQUE: Dose reduction techniques were used. CONTRAST: Iohexol (Omni) 100 mL COMPARISON: CT angiogram of the chest 01/29/2021 FINDINGS:  LIMITED CHEST: Upper zone predominant emphysema. No actionable lung nodules or airspace opacities. No pleural fluid is present. LIMITED MEDIASTINUM: Negative. LIMITED UPPER ABDOMEN: Small focus " of early hepatic enhancement in the superior aspect of segment IV, most likely a benign hemangioma.     1.  No actionable incidental extracardiac findings. 2.  Please refer to cardiologist's dictation for the cardiac CT report.     Cta Coronary W Calcium Score    Result Date: 2/1/2021    The total Agatston calcium score is 507. A calcium score in this range places the individual in the 100th percentile when compared to an age and gender matched control group and implies a very high risk of cardiac events in the next ten years.   Right dominant coronary artery system.   Mild nonobstructive coronary artery disease in the left main, left anterior descending artery and circumflex artery.      Nm Pet Ct Skull To Mid Thigh    Result Date: 2/4/2021  EXAM: NM PET CT SKULL TO MID THIGH LOCATION: [facility] DATE/TIME: 2/4/2021 2:21 PM INDICATION: Solitary pulmonary nodule, right upper lobe. COMPARISON: CTA of the chest abdomen pelvis dated 01/29/2021 and CT of the chest dated 04/23/2019. TECHNIQUE: Serum glucose level 134 mg/dL. One hour post intravenous administration of 9.1 mCi F-18 FDG, PET imaging was performed from the skull base to the mid thighs utilizing attenuation correction with concurrent axial CT and PET/CT image fusion. Dose reduction techniques were used. FINDINGS: Mildly FDG avid spiculated nodule in the right upper lobe measuring 1.6 x 1.1 cm (max SUV 2.1, previously measured 1.1 x 0.9 cm on CT of the chest dated 04/23/2019) suspicious for primary lung neoplasm of low metabolic activity such as adenocarcinoma without metastasis. Mild reflux esophagitis. The remaining FDG uptake is physiologic from the skull base to mid thigh. Mild senescent intracranial changes. Postoperative change of the bilateral lenses. Mild to moderate carotid artery bifurcation calcification. Moderate coronary artery calcium. Right renal cysts. Tubal ligation. Multilevel degenerative changes of the spine.      Findings suspicious for right upper lobe adenocarcinoma without metastasis.    Cta Chest Abdomen Pelvis    Result Date: 1/29/2021  EXAM: CTA CHEST ABDOMEN PELVIS LOCATION: [facility] DATE/TIME: 1/29/2021 2:23 PM INDICATION: chest pain, abd pain COMPARISON: CT chest 04/23/2019 TECHNIQUE: CT angiogram chest abdomen pelvis during arterial phase of injection of IV contrast. 2D and 3D MIP reconstructions were performed by the CT technologist. Dose reduction techniques were used. CONTRAST: Iohexol (Omni) 100 mL FINDINGS: CT ANGIOGRAM CHEST, ABDOMEN, AND PELVIS: The aorta is normal in size and contour. No dissection or stenosis. Moderate atherosclerotic vascular disease. The major aortic branches in the chest, abdomen and pelvis are patent. Replaced left gastric from the aorta. No pulmonary embolus. Moderate coronary artery calcification. LUNGS AND PLEURA: Enlarging spiculated right upper lobe pulmonary nodule measuring 1.7 x 0.8 cm, previously 0.9 x 0.6 cm, image 54:6. A new 0.5 cm right lower lobe pulmonary nodule, image 103:6. A new 0.3 cm left upper lobe pulmonary nodule, image 80:6. A stable 0.3 cm solid left lower lobe pulmonary nodule, image 140:6. Severe emphysema. Stable mild diffuse bronchial wall thickening. No pleural effusion. MEDIASTINUM/AXILLAE: A stable 1.2 cm aorticopulmonary window lymph node, image 111:5. No other lymphadenopathy. HEPATOBILIARY: Multiple arterial hyperenhancing foci in the liver with the largest measuring 1.5 x 1.5 cm in segment 4A , image 272:5. PANCREAS: Normal. SPLEEN: Normal. ADRENAL GLANDS: Normal. KIDNEYS/BLADDER: A simple cyst in the midpole right kidney, no follow-up required. Subcentimeter renal hypodensities which are too small to characterize. No hydronephrosis. BOWEL: Diverticulosis of the colon. No acute inflammatory change. No obstruction. LYMPH NODES: Normal. PELVIC ORGANS: Normal. MUSCULOSKELETAL: Remote left rib fractures. Degenerative  changes of the spine and hips. No suspicious osseous lesion.     1.  No acute vascular abnormality in the chest, abdomen or pelvis. 2.  Enlarging spiculated 1.7 cm right upper lobe pulmonary nodule. This is suspicious for a primary lung cancer. Recommend PET scan. 3.  Multiple arterial hyperenhancing hepatic lesions measuring up to 1.5 cm. These may be benign or malignant. They can be further evaluated with the PET scan. If the PET scan is indeterminate, a contrast enhanced magnetic resonance imaging examination of the liver should be performed. 4.  There are 2 new subcentimeter pulmonary nodules. 5.  A stable single enlarged mediastinal lymph node. 6.  Severe emphysema. REFERENCE: Guidelines for Management of Incidental Pulmonary Nodules Detected on CT Images: From the Fleischner Society 2017. Guidelines apply to incidental nodules in patients who are 35 years or older. Guidelines do not apply to lung cancer screening, patients with immunosuppression, or patients with known primary cancer. SINGLE NODULE Nodule size >8 mm Either low or high-risk patients: Consider CT, PET/CT, or tissue sampling at 3 months. Consider referral to lung nodule clinic. NOTE: ABNORMAL REPORT THE DICTATION ABOVE DESCRIBES AN ABNORMALITY FOR WHICH FOLLOW-UP IS NEEDED.       Signed by: Timoteo Barnhart MD

## 2021-06-15 NOTE — PROGRESS NOTES
Medication Therapy Management (MTM) Encounter    ASSESSMENT:                            Medication Adherence/Access: See below for considerations    COPD: Patient would benefit from having a short acting bronchodilator.  We will send in prescription for generic albuterol this is the least expensive if using a GoodRx coupon and would be about $20.  The pharmacy will have to run through her insurance to compare prices for her.  I also recommended she start the air duo inhaler as recommended by her pulmonologist.  She does not want to start this at this time and would like to speak with the oncologist first if [facility] pharmacy has not been able to order the prescription for her anymore it would be next least expensive at [facility] and with the GoodRx coupon was about $45.  We discussed the Spiriva patient assistance program, and she said she would not qualify for this.  If she requires an ICS/LABA inhaler we could consider changing her Spiriva to Trelegy to help reduce cost overall.    Smoking Cessation: Unable to fully assess    Allergic Rhinitis: Stable.    Osteoporosis: Stable, plan in place to recheck DEXA scan.  We will send patient information on recommended calcium and vitamin D intake (1200 mg calcium per day in divided dose).   Would be beneficial for us to recheck her vitamin D level at her next in clinic visit    Hypertension/Angina: Hypertension: Stable. Patient is meeting blood pressure goal of < 140/90mmHg.    Hyperlipidemia: Stable. Patient is on high intensity statin which is indicated based on 2019 ACC/AHA guidelines for lipid management.      Supplements/OTCs: Stable.    PLAN:                            1.  Start albuterol HFA inhaler.  Sent prescription to [facility].  Can compare price between insurance and the GoodRx coupon    2.  Recommend consider starting the air duo inhaler that Dr. Gonzalez sent in for you.  You can ask [facility] pharmacy to transfer it from [facility] pharmacy and they can fill it there if  [facility] is unable to order this    3.  Recommend checking vitamin D level at next in clinic appointment    Follow-up: As needed    SUBJECTIVE/OBJECTIVE:                          Shantel Romero is a 75 year old female called for an initial visit. She was referred to me from Dr. Ogden.      Reason for visit: Initial medication review.  Referral to help with her issues with cost on her inhalers and help her find a more affordable alternative.    Allergies/ADRs: Reviewed in chart  Tobacco: She reports that she has been smoking. She does not have any smokeless tobacco history on file.  Alcohol: not currently using  Caffeine: no caffeine  Activity: Not discussed  Past Medical History: Reviewed in chart    Medication Adherence/Access:   Cost is a significant issue for her.  She said she has been unable to get the new inhaler that her lung doctor wanted to start her on.  Looked at his last visit note and Mrs. AirDuo RespiClick    COPD: Current medications: LAMA- Spiriva Handihaler 1 puff(s) once daily.   She sometimes has issues with the Spiriva HandiHaler where the capsule breaks and she is unable to get dose from it.  Not happening frequently patient reports that she wants an inhaler to help with shortness of breath for as needed use her rescue inhaler.  She does not currently have one.  Patient has a slowly growing adenocarcinoma of the lung.  She will be meeting with the radiologist and likely doing radiosurgery syndrome.  She does not want to start the air duo inhaler until after speaking with radiologist.  She saw Dr. Timoteo Gonzalez, who wanted her to start AirDuo Respiclick.  Patient said that the [facility] was no longer able to get the inhaler.  She was getting it there with a good Rx coupon.  Patient is not experiencing side effects.   Patient reports the following symptoms: Having shortness of breath 1-2 times per week for less often.  Usually occurs with physical exertion.}.  Patient does not have an COPD  Action Plan on file.   Has spirometry been completed: Doesn't know, may be on file with pulmonologist    Smoking Cessation: Currently taking Nicotine patch 14 mg daily, and nicotine 2 mg gum PRN.  Not able to discuss in detail today.  Patient did not have a lot of time to review each medication great detail.    Allergic Rhinitis: Current medications include fluticasone nasal spray 1 spray(s) once daily. Primary symptoms are nasal congestion, post-nasal drip and runny nose. Patient feels that current therapy is effective.     Osteoporosis: Current therapy includes: ibandronate (Boniva) 150mg monthly (Pt has been on current therapy since 10/2020). Pt is not experiencing side effects.  DEXA History: See in medical chart  Patient is getting the following sources of dietary calcium: Did not have time to discuss today  Risk factors: post-menopausal  Last vitamin D level: Not on file with primary care     Hypertension/Angina: Current medications include Lisinopril 5 mg daily, Nitroglycerin 0.1 mg/hr patch.  Patient is also taking aspirin 81 mg daily for primary prevention.  Denies issues with chest pains.  Reports no issues with bruising and bleeding.  Patient does not self-monitor blood pressure.  Patient reports no current medication side effects.  BP Readings from Last 3 Encounters:   03/12/21 120/76   02/08/21 (!) 141/72     Hyperlipidemia: Current therapy includes Atorvastatin 40mg daily.  Patient reports no significant myalgias or other side effects.  Last lipid panel was within normal limits.    Supplements/OTCs: Vitamin D 2000 units daily, fish oil 1000 mg daily, multivitamin daily.  Denies issues.  Patient also has acetaminophen 500 mg as needed for pain but rarely uses this.    Today's Vitals: There were no vitals taken for this visit. -Telephone encounter  ----------------      I spent 30 minutes with this patient today. All changes were made via collaborative practice agreement with Dr. Mccormick. A copy of the  visit note was provided to the patient's primary care provider.    The patient was mailed a summary of these recommendations.     Neeru Hendrickson PharmD  Medication Therapy Management Pharmacist  Pager: [phone]      Telemedicine Visit Details  Type of service:  Telephone visit  Start Time: 12:30 PM  End Time: 1:00 PM  Originating Location (patient location): [place]  Distant Location (provider location):  [facility]    {Click at end of visit to add-in [id number]}

## 2021-06-15 NOTE — TELEPHONE ENCOUNTER
Records in Epic Chart Review / Radiology images in Nil.    Notes  2/1/2021 - Discharge Summary / Admission 1/29/2021                 - Progress Notes / Cardiology, Dr. Lopez Roman.                 - Consults / Cardiology, Dr. Shivam Denise.                 - H&P                 - ED Provider Notes.    Labs  Hospital results & hospitals Clinics.    Imaging  2/4/2021 - NM PET CT.  2/1/2021 - CT Chest & CTA Coronary W Calcium Score.  1/29/2021 - CTA Chest Abdomen Pelvis.    Media - [facility]  2/5/2021 - Medication.  11/3/2020 - Progress Note Ext / Dr. Timoteo Ogden.  10/9/2020 - Lab Results / [facility].  10/9/2020 - Progress Note Ext / Establish new Pt.care. Dr. Timoteo Ogden.

## 2021-06-15 NOTE — TELEPHONE ENCOUNTER
Cosme called to verify her cardiology appt. I reviewed her chart and relayed appts details. She is wondering if a family member can accompany her to the appt. Her sister in law would like to join her. I told Cosme I am not aware of the current visitor policy in heart care and I encouraged her to give them a call. Number was provided. She was appreciative of the assistance.

## 2021-06-15 NOTE — PATIENT INSTRUCTIONS - HE
It was nice to visit with you today.  We talked about this episodic comfort that you are having in your upper chest.  Recent tests show that you do not have any your obstruction to blood flow to the your heart but you do have a fair amount of plaque.  We talked about adding a nitroglycerin patch as you preferred patch over a pill at least to begin with.  Please place the patch each morning and take off each evening.  If you have a mild headache please use Tylenol if the headache is significant and you can just discontinue the patch.  Please update me with how the patch is helping or not helping and if you are having any side effects.  Going to plan a 24-hour monitor and cholesterol panel next week.  My nurse is Chica and her number is [phone].  We will also plan to have you follow-up with my nurse practitioner in 2 to 3 weeks.  Please call with questions.

## 2021-06-15 NOTE — TELEPHONE ENCOUNTER
Cosme called asking if she needs to schedule another appt with Dr. Barnhart. She is also wondering if she needs a follow up with cardiology. She said her sister has been on her case to schedule her appts. I reviewed her future appts with cancer care, including a follow up with Dr. Barnhart on 8/2 and CT prior on 7/30. I explained this is for monitoring her right lung nodule. I reviewed her hospital discharge instructions which advised a follow up with primary care in 1 week and cardiology in 2-3 weeks. She was seen by Dr. Denise during her admission. Cosme said she already had an appt with her primary care doctor but she has not scheduled a follow up with cardiology. I provided the number for cardiology today ([phone]) and Cosme will call to schedule her appt. She said her blood pressures have been all over the place. I encouraged her to speak with the nurse in cardiology, or primary care if needed, for further guidance on this. Cosme was appreciative of the assistance.

## 2021-06-16 ENCOUNTER — OFFICE VISIT - HEALTHEAST (OUTPATIENT)
Dept: RADIATION ONCOLOGY | Facility: CLINIC | Age: 76
End: 2021-06-16

## 2021-06-16 DIAGNOSIS — C34.11 MALIGNANT NEOPLASM OF UPPER LOBE OF RIGHT LUNG (H): ICD-10-CM

## 2021-06-16 PROBLEM — Z72.0 TOBACCO ABUSE: Status: ACTIVE | Noted: 2021-02-01

## 2021-06-16 PROBLEM — R07.9 CHEST PAIN: Status: ACTIVE | Noted: 2021-01-29

## 2021-06-16 PROBLEM — R91.8 MASS OF UPPER LOBE OF RIGHT LUNG: Status: ACTIVE | Noted: 2021-02-08

## 2021-06-16 NOTE — TELEPHONE ENCOUNTER
Cosme called stating she just got some [employer] notifications that her appts were moved up. This concerns her and she's wondering why. I reviewed her chart and noted that her sister in law, Tyesha, called the clinic to inform Dr. Barnhart that Cosme was recently at [place] with chest pain and shortness of breath. She had imaging done there and changes were noted on her CT scan. I connected with Harleen and scheduling had left Cosme a VM but she hasn't checked it. I relayed that Dr. Barnhart would like to keep a close watch and that's why her appts have been moved up. She hopes this doesn't mean she will need treatment as her brother has cancer and he is getting chemo. This would be a lot for her family to handle. I reassured her there are no plans for tx at this time. Dr. Barnhart will review her next CT with her in June and, based on those findings, determine the plan moving forward, which may be continued observation. Cosme is aware radiation may be discussed again as well.  She was appreciative of the conversation and she has no further questions at this time.  I received a call from Cosme's sister in law, Tyesha, a short time later. Cosem has given verbal consent to communicate with Tyesha. She requested to verify appt details and asked why Cosme is scheduled for an infusion on June 7th. I relayed that the appt on the 7th is for lab work prior to her clinic visit with Dr. Barnhart and she will not be getting an infusion. Tyesha asked if she can accompany Cosme to her appt and I shared that, at this time, patients may have 1 visitor accompany them to clinic appts. Tyesha was glad to hear this. She has no further questions or needs at this time. I welcomed calls. I also provided the  cancer care clinic phone number as T[facility]ha [facility].  Message sent to the medical records pool to please request Cosme's CTA chest dated 4/5/2021 from [place].

## 2021-06-16 NOTE — TELEPHONE ENCOUNTER
"He notes indicated they thought that it was perhaps more related to her pulmonary issues and should follow-up with her pulmonologist.  Below is part of that note.  To review she had a CT angiogram within the past few months that showed nonobstructive coronary artery disease.  Therefore my recommendation would be that she start with follow-up with both her primary physician as well as the pulmonologist and then let us know if she continues to have any symptoms of chest discomfort but the shortness of breath very well may be more related to her lung issues.  Can we please get a copy of the EKG to review.    Symptoms were reviewed with her and her sister-in-law. Patient describes that pain often occurs with activity. However, she later described come on with rest or with activity. Pain also has been present in the past but was just more severe last evening. In reviewing previous testing, cardiology'sprevious concern for \" microvascular angina\" and initiation of nitroglycerin patch decision was made to ambulate her with physical therapy. This did not reproduce her symptoms.    Overall, etiology of her chest discomfort is unclear but appears to be noncardiac based on recent nonobstructive coronary CTA, recent Holter, and current serial troponins which were negative.     In regards to her shortness of breath, I am concerned that this may be multifactorial secondary to her emphysema and lung malignancy. She ambulated with activity and did develop some chronic dizziness. However she was able to go 100 feet x 1 and 160 feet x 2. She felt fatigued afterwards but did not sleep well last night. No chest pain was provoked.    Recommend outpatient follow-up with pulmonary and PCP. Additionally, she could follow back up with her outpatient cardiologist    "

## 2021-06-16 NOTE — TELEPHONE ENCOUNTER
Per Dr. Barnhart,   follow-up in early June instead of early August.  Repeat the imaging just before that appointment as well.  Currently scheduled appointments need to be moved up by 2 months.      Per ALLAN Smyth, CT rescheduled to 6/4/21 and appointment with Dr. Barnhart rescheduled to 6/7/2/ANTONIO Cheng RN

## 2021-06-16 NOTE — TELEPHONE ENCOUNTER
"Received message from family member (she didn't leave name) stating patient recently admitted at [place] for chest pressure and shortness of breath.  She would like Dr. Barnhart to look at and review xray done. She didn't think patient had CT. Patient phone [phone].   Family member can be reached at [phone]/ANTONIO Cheng RN     Called patient as I don't have consent to communicate on file.  Patient reports she went to hospital by ambulance on Easter with CP, shortness of breath, difficulty walking.  She said \"they didn't find anything\". She said that she is feeling better today.  She said Tyesha, her jvrinl-c-dez called and she gave me verbal permission to call Tyesha. \"She knows what's going on\".  I verbalized I would call her. Patient appreciative of my call/ANTONIO Cheng RN    Called Tyesha. I told her patient gave me permission to talk to her.  Tyesha reports CP resolved but having some lightheadedness, motion sickness, sweating after applying NTG patch today. She reports patient has not been smoking or drinking and chewing 1-2 mg nicotine gums, as needed.  I told Tyesha that Dr. Denise prescribed NTG patches so I recommended she follow-up with them for further management of patient's medications as Dr. Barnhart is not currently prescribing any medications to patient. Tyesha thought she was talking to Dr. Denise's nurse.  She will wait for their return call.     I did tell her that I will update Dr. Barnhart about her recent admission and if he needs to see patient before her July lab, CT appt, I will call back. She verbalized understanding/ANTONIO Cheng RN  "

## 2021-06-16 NOTE — TELEPHONE ENCOUNTER
Pt updated via [employer] message.   Message sent to [facility] to get copy of EKG.  -[facility]

## 2021-06-16 NOTE — TELEPHONE ENCOUNTER
----- Message from Willian Mason sent at 4/6/2021 10:17 AM CDT -----  Regarding: [facility] PT / HOSPITALIZATION  General phone call:    Caller: Tyesha pt's sister in law     Primary cardiologist: [facility]     Detailed reason for call: Tyesha states that pt was hospitalized at [facility] on Sunday evening. They wanted to make [facility] aware of this and to also review pt's chart in care everywhere. Also, Tyesha said the EMT personnel declined [facility] because they said that [facility] will not take any cardiac patients. Tyesha states that she and her  believe that they were not telling them the truth. She states that pt has been discharged and doing well now, but if [facility] has any questions or concerns, please call Crystal or pt herself.     Best phone number: Pt's number: [phone] or pt's sister in law Tyesha at: [phone].     Best time to contact: Anytime     Ok to leave a detailed message? Yes     Device? No     Additional Info:

## 2021-06-17 NOTE — PROGRESS NOTES
Medication Therapy Management (MTM) Encounter    ASSESSMENT:                            Medication Adherence/Access: See below for considerations    COPD: Patient would benefit from having a short acting bronchodilator.  We will send in prescription for generic albuterol this is the least expensive if using a GoodRx coupon and would be about $20.  The pharmacy will have to run through her insurance to compare prices for her.  I also recommended she start the air duo inhaler as recommended by her pulmonologist.  She does not want to start this at this time and would like to speak with the oncologist first if [facility] pharmacy has not been able to order the prescription for her anymore it would be next least expensive at [facility] and with the GoodRx coupon was about $45.  We discussed the Spiriva patient assistance program, and she said she would not qualify for this.  If she requires an ICS/LABA inhaler we could consider changing her Spiriva to Trelegy to help reduce cost overall.    Smoking Cessation: Unable to fully assess, pt didn't have enough time.    Allergic Rhinitis: Stable.    Osteoporosis: Stable, plan in place to recheck DEXA scan.  We will send patient information on recommended calcium and vitamin D intake (1200 mg calcium per day in divided dose).   Would be beneficial for us to recheck her vitamin D level at her next in clinic visit    Hypertension/Angina: Stable. Patient is meeting blood pressure goal of < 140/90mmHg.    Hyperlipidemia: Stable. Patient is on high intensity statin which is indicated based on 2019 ACC/AHA guidelines for lipid management.      Supplements/OTCs: Stable.    PLAN:                            1.  Start albuterol HFA inhaler.  Sent prescription to [facility].  Can compare price between insurance and the GoodRx coupon    2.  Recommend consider starting the Air Duo inhaler that Dr. Gonzalez sent in for you.  You can ask [facility] pharmacy to transfer it from [facility] pharmacy and they can  fill it there if [facility] is unable to order this.  [facility] also has a good rx coupon card that you can download from the www.[employer].com website.  It said the AirDuo inhaler would be about $40-47 there.      3.  Recommend checking vitamin D level at next in clinic appointment.    Follow-up: As needed    SUBJECTIVE/OBJECTIVE:                          Shantel Romero is a 75 year old female called for an initial visit. She was referred to me from Dr. Ogden.      Reason for visit: Initial medication review.  Referral to help with her issues with cost on her inhalers and help her find a more affordable alternative.    Allergies/ADRs: Reviewed in chart  Tobacco: She reports that she has been smoking. She does not have any smokeless tobacco history on file.  Alcohol: not currently using  Caffeine: no caffeine  Activity: Not discussed  Past Medical History: Reviewed in chart    Medication Adherence/Access:   Cost is a significant issue for her.  She said she has been unable to get the new inhaler that her lung doctor wanted to start her on.  Looked at his last visit note and he prescribed AirDuo RespiClick.  Patient said she needs a rescue inhaler, doesn't have one.    COPD: Current medications: LAMA- Spiriva Handihaler 1 puff(s) once daily.   She sometimes has issues with the Spiriva HandiHaler where the capsule breaks and she is unable to get dose from it.  Not happening frequently patient reports that she wants an inhaler to help with shortness of breath for as needed use her rescue inhaler.  She does not currently have one.  Patient has a slowly growing adenocarcinoma of the lung.  She will be meeting with the radiologist and likely doing radiosurgery syndrome.  She does not want to start the air duo inhaler until after speaking with radiologist.  She saw Dr. Timoteo Gonzalez, who wanted her to start AirDuo Respiclick.  Patient said that the [facility] was no longer able to get the inhaler.  She was getting it there  with a good Rx coupon.  Patient said she doesn't want to get this inhaler at [facility], would be about $10 more and she just wants the rescue inhaler.  Patient is not experiencing side effects.   Patient reports the following symptoms: Having shortness of breath 1-2 times per week for less often.  Usually occurs with physical exertion.  Patient does not have an COPD Action Plan on file.   Has spirometry been completed: Doesn't know, may be on file with pulmonologist    Smoking Cessation: Currently taking Nicotine patch 14 mg daily, and nicotine 2 mg gum PRN.  Not able to discuss in detail today.  Patient did not have a lot of time to review each medication great detail.    Allergic Rhinitis: Current medications include fluticasone nasal spray 1 spray(s) once daily. Primary symptoms are nasal congestion, post-nasal drip and runny nose. Patient feels that current therapy is effective.     Osteoporosis: Current therapy includes: ibandronate (Boniva) 150mg monthly (Pt has been on current therapy since 10/2020). Pt is not experiencing side effects.  DEXA History: See in medical chart  Patient is getting the following sources of dietary calcium: Did not have time to discuss today  Risk factors: post-menopausal  Last vitamin D level: Not on file with primary care     Hypertension/Angina: Current medications include Lisinopril 5 mg daily, Nitroglycerin 0.1 mg/hr patch.  Patient is also taking aspirin 81 mg daily for primary prevention.  Denies issues with chest pains.  Reports no issues with bruising and bleeding.  Patient does not self-monitor blood pressure.  Patient reports no current medication side effects.  BP Readings from Last 3 Encounters:   03/12/21 120/76   02/08/21 (!) 141/72     Hyperlipidemia: Current therapy includes Atorvastatin 40mg daily.  Patient reports no significant myalgias or other side effects.  Last lipid panel was within normal limits.    Supplements/OTCs: Vitamin D 2000 units daily, fish oil 1000  mg daily, multivitamin daily.  Denies issues.  Patient also has acetaminophen 500 mg as needed for pain but rarely uses this.    Today's Vitals: There were no vitals taken for this visit. -Telephone encounter  ----------------    I spent 30 minutes with this patient today. All changes were made via collaborative practice agreement with Dr. Mccormick. A copy of the visit note was provided to the patient's primary care provider.    The patient was mailed a summary of these recommendations.     Neeru Hendrickson PharmD  Medication Therapy Management Pharmacist  Pager: [phone]      Telemedicine Visit Details  Type of service:  Telephone visit  Start Time: 12:30 PM  End Time: 1:00 PM  Originating Location (patient location): [place]  Distant Location (provider location):  [facility]      Medication Therapy Recommendations  Chronic obstructive pulmonary disease, unspecified COPD type (H)    Current Medication: tiotropium (SPIRIVA) 18 MCG inhaled capsule   Rationale: Synergistic therapy - Needs additional medication therapy - Indication   Recommendation: Start Medication   Status: Accepted per CPA   Note: albuterol inhaler

## 2021-06-17 NOTE — PATIENT INSTRUCTIONS
Recommendations from today's MTM visit:                                                    MTM (medication therapy management) is a service provided by a clinical pharmacist designed to help you get the most of out of your medicines.   Today we reviewed what your medicines are for, how to know if they are working, that your medicines are safe and how to make your medicine regimen as easy as possible.      1.  Start albuterol HFA inhaler, 1-2 puffs as needed every 4 hours for shortness of breath.     2.  Recommend consider starting the Air Duo inhaler that Dr. Gonzalez sent in for you.  You can ask [facility] pharmacy to transfer it from [facility] pharmacy and they can fill it there if [facility] is unable to order this.  [facility] also has a [employer] rx coupon card that you can download from the www.[employer] website.  It says the AirDuo inhaler would be about $40-47 there.      3.  I didn't get a chance to ask you during our phone appointment, but wanted to make sure you are getting enough calcium and vitamin D.  When you are on a medication like ibandronate to help build up your bone density, it's recommended to get 500-600 mg of calcium per day and 1000 units of vitamin D per day.  This can be from diet and supplements.      Follow-up: As needed    It was great to speak with you today.  I value your experience and would be very thankful for your time with providing feedback on our clinic survey. You may receive a survey via email or text message in the next few days.     To schedule another MTM appointment, please call the clinic directly or you may call the MTM scheduling line at [phone] or toll-free at [phone].     My Clinical Pharmacist's contact information:                                                      Please feel free to contact me with any questions or concerns you have.

## 2021-06-18 ENCOUNTER — AMBULATORY - HEALTHEAST (OUTPATIENT)
Dept: RADIATION ONCOLOGY | Facility: HOSPITAL | Age: 76
End: 2021-06-18

## 2021-06-18 DIAGNOSIS — C34.11 MALIGNANT NEOPLASM OF UPPER LOBE OF RIGHT LUNG (H): ICD-10-CM

## 2021-06-21 NOTE — LETTER
Letter by Abbey Servin RN at      Author: Abbey Servin RN Service: -- Author Type: --    Filed:  Encounter Date: 2/2/2021 Status: (Other)       Dear Shantel Romero    Thank you for choosing [facility] for your care.  We are committed to providing you with the highest quality and compassionate healthcare services.  The following information pertains to your first appointment with our clinic.    Date/Time of appointment:  Monday, February 8th 2021 at 11:00 am.      Note: Please arrive by 10:35 am.  This allows time to complete forms, possible labs and nursing assessment.    Due to the current COVID-19 pandemic, visitor restrictions are place.  You must arrive to your appointment unaccompanied, however, a friend or family member is welcome to join the appointment by phone.     For your protection and the protection of other patients and staff, please  arrive for your appointment wearing a mask.     Name of your Physician: Dr. Timoteo Barnhart     What to bring to your appointment:    Completed Patient History/Initial Nursing Assessment, Medication/Allergy List and Consent to Communicate (these forms were sent to you by email).    Your current insurance card(s).    Parking:    Please refer to the map included to direct you to [facility].    The Cancer Care parking lot (labeled Radiation Oncology parking lot on the enclosed map) is west of the main entrance, this is free parking and is right next to the Cancer Care Entrance.    Come in the Cancer Care Entrance and check in.    Please note appointments may last 1.5-2 hours.        We hope these instructions are helpful to you.  If you have any questions or concerns, please call us at [phone].  It is our pleasure to assist you.    Warm Regards,  Abbey Servin  Nurse Navigator  [phone]

## 2021-06-26 NOTE — PROGRESS NOTES
Pt ambulatory to radiation clinic with sister julianne singh for an initial radiation consult. RN spent 15 minutes with pt discussing RT, RT planning, and RT side effects. ACS RT, Krames RT, Pathway, and Lung RT hand outs given with explanation. Further recommendations and orders per provider.

## 2021-06-26 NOTE — PROGRESS NOTES
[facility] Radiation Oncology Consult Note    Patient: Shantel Romero  MRN: [id number]  Date of Service: 06/16/2021    Assessment / Impression:  74 y/o woman with stage IA2 presumed non-small cell lung cancer of the right upper lobe    Malignant neoplasm of upper lobe of right lung (H)  (primary encounter diagnosis)    Cancer Staging  Malignant neoplasm of upper lobe of right lung (H)  Staging form: Lung, AJCC 8th Edition  - Clinical stage from 6/16/2021: Stage IA2 (cT1b, cN0, cM0) - Signed by Jessica Fong MD on 6/16/2021    Distress Assessment Score: 1  ECOG PS: 2    Plan:  Discussed definitive SBRT radiation therapy with patient and her sister-in-law.  Reviewed high clinical suspicion that growing spiculated nodule is early-stage non-small cell lung cancer, adenocarcinoma. Discussed radiology (per report), pulmonary, medical oncology, and I are all in agreement regarding the concern of this right upper lobe nodule.  The only way to know definitively would be to undergo CT-guided biopsy.  Reviewed the benefits and risks of undergoing biopsy and fiducial placement.  Patient declines biopsy and fiducial placement due to potential risks, primarily pneumothorax, which although low likelihood terrifies her.  Sister-in-law who assists with healthcare and appointment coordination also respects this decision.  Given published data on empiric radiation therapy for presumed non-small cell lung cancer, in agreement to proceed without biopsy and fiducial.    The indications for, process of, alternatives, potential side effects and complications of SBRT to the right upper lobe were discussed.    They asked multiple questions which were answered to their satisfaction and they verbalized understanding.    They wish to proceed with SBRT to the right lung and consent is signed today.  They will return to clinic next week for 4D-CT simulation for RT planning.     Thank you for allowing me to participate in  the care of this patient. Feel free to contact me with all questions or concerns.     Total time of this visit, including time spent face-to-face with patient and or via video/audio, and also in preparing for today's visit for MDM and documentation. Medical decision-making included consideration and possible diagnoses, management options, complex record review, review of diagnostic tests and information, consideration and discussion of significant complications based on comorbidities, discussion with providers involved in the care of the patient.    100 Minutes spent.     This note has been generated using voice recognition software. There may be some errors that were not identified at the time of documented.    Intent of Therapy: Curative  Side effects that may occur during or within weeks after Radiation Therapy      Fatigue and general weakness     Darkening, irritation, itchiness, redness, dryness and peeling of the skin of the chest    Loss of chest and armpit hair    Painful swallowing limiting solid and liquid intake and causing dehydration    Nausea, vomiting and decrease in appetite    Side effects that may occur months or years after Radiation Therapy       Development of another tumor or cancer    Lung inflammation or fibrosis causing cough, fever, and shortness of breath     Fracture of ribs    Permanent narrowing or obstruction of the esophagus    Fluid compressing the lung (pleural effusion)    Coronary artery blockage causing angina pain or a heart attack    Thickening, telangiectasias (development of spider like blood vessels in the skin) and ulceration of the skin of the chest    Poor healing after a trauma or surgery in the irradiated areas    Nerve damage resulting in loss of strength or sensation    The risks, benefits and alternatives to radiation therapy were outlined with the patient. All questions were answered and a consent was signed.    Subjective:  HPI: Shantel Romero is a 75 y.o.  female with Patient presents with:  HE Cancer  Radiation    Patient had a fall and had CT chest performed 4/23/2019 which demonstrated rib fractures related to trauma as well as a right upper lobe 6 mm groundglass opacity for which 6-month follow-up was recommended. Patient and sister-in-law do not recall this being discussed.    An evaluation for chest pain CTA was performed on 1/29/2021 and described and enlarging spiculated 1.7 cm right upper lobe pulmonary nodule. This is suspicious for a primary lung cancer. PET scan was recommended for further staging.    2/4/2021 PET/CT described a mildly FDG avid spiculated nodule in the right upper lobe measuring 1.6 x 1.1 cm (max SUV 2.1) concerning for right upper lobe adenocarcinoma with no evidence of metastases. Patient met with medical oncology MD Barnhart and discussed concern of nodule representing a slow-growing adenocarcinoma. At that time patient opted for observation over surgical resection or stereotactic radiosurgery. Repeat imaging was performed 6/4/2021 CT chest and demonstrated irregular right upper lobe nodule measuring 1.2 x 1.7 cm increase in size and solid component seen since 2019 concerning for adenocarcinoma. She is also noted to have a stable 4 mm left upper lobe nodule. No lymphadenopathy.    Patient went with pulmonology the [facility] 5/4/2021 (MD Gonzalez), and pulmonary function tests performed.    FEV1 1.06 L 46% predicted  FEV1/FVC C 60% DLCO 56%  Revealing severe obstructive impairment with mild and clinically significant improvement postbronchodilator. Diffusing capacity when corrected for alveolar volume is normal.  She was told she is not a good candidate for surgical resection given FEV1 1.06 L and dyspnea with minimal exertion. They also discussed consideration of navigational bronchoscopy for biopsy without transthoracic approach to minimize risk of pneumothorax. However, if comfortable proceeding to stereotactic body radiation  therapy without definitive diagnosis a biopsy would not be necessary.    Patient reports shortness of breath with activity. She can do approximately 1 flight of stairs before she stops to catch her breath. The distance she is able to walk on flat surfaces is variable from day today and she has increased difficulty with incline or carrying things. Tobacco use she reports this former she quit in January. She had previously attempted to quit multiple times in the past but resumed due to weight gain. She has a approximately 80- pack-year history (2 packs/day x 40 years). She is very concerned about the risks of biopsy. Procedure for biopsy worries her more than undergoing radiation therapy for nodule without confirmation of malignancy.  Her sister-in-law who is instrumental in coordinating and assisting with appointments (as well as ongoing cancer appointments for her /patient's brother) is supportive of patient's decision.    CHEMOTHERAPY HISTORY: Concurrent Chemotherapy: No  RADIATION THERAPY HISTORY: Prior Radiation: No  IMPLANTED CARDIAC DEVICE: none  PREGNANCY: no    Current Outpatient Medications:  acetaminophen (TYLENOL) 500 MG tablet, Take 1-2 tablets (500-1,000 mg total) by mouth every 4 (four) hours as needed., Disp:  , Rfl: 0  aspirin 81 MG EC tablet, Take 1 tablet (81 mg total) by mouth daily., Disp:  , Rfl: 0  atorvastatin (LIPITOR) 40 MG tablet, Take 40 mg by mouth at bedtime., Disp: , Rfl:   cholecalciferol, vitamin D3, 250 mcg (10,000 unit) Tab, Take 10,000 Units by mouth daily. , Disp: , Rfl:   fluticasone propionate (FLONASE) 50 mcg/actuation nasal spray, 2 sprays into each nostril., Disp: , Rfl:   HEMP OIL OR EXTRACT OR OTHER CBD CANNABINOID, NOT MEDICAL CANNABIS,, Take by mouth every morning. 1/2 dropper for back pain, Disp: , Rfl:   ibandronate (BONIVA) 150 mg tablet, Take 150 mg by mouth every 30 (thirty) days. Take in AM with glass of water prior to food, don't lie down for 30 minutes.,  Disp: , Rfl:   lisinopriL (PRINIVIL,ZESTRIL) 5 MG tablet, Take 5 mg by mouth daily., Disp: , Rfl:   multivitamin (ONE A DAY) per tablet, Take 1 tablet by mouth., Disp: , Rfl:   nicotine polacrilex (NICORETTE) 4 MG gum, Apply 4 mg to the mouth or throat as needed for smoking cessation., Disp: , Rfl:   nitroglycerin (NITRODUR) 0.1 mg/hr, Place 1 patch on the skin daily., Disp: 90 patch, Rfl: 1  OMEGA-3/DHA/EPA/FISH OIL (FISH OIL-OMEGA-3 FATTY ACIDS) 300-1,000 mg capsule, Take 1 g by mouth daily. , Disp: , Rfl:   tiotropium (SPIRIVA) 18 mcg inhalation capsule, Place 18 mcg into inhaler and inhale every evening. , Disp: , Rfl:   UNABLE TO FIND, Take 1 tablet by mouth daily. Med Name: pine bark, Disp: , Rfl:   UNABLE TO FIND, Take 1 tablet by mouth daily. Med Name: curcumioids form Turmeric, Disp: , Rfl:     No current facility-administered medications for this visit.     Past Medical History:  No date: COPD (chronic obstructive pulmonary disease) (H)  02/2021: Coronary artery disease due to lipid rich plaque      Comment:  Abnormal coronary CTA  No date: Family history of myocardial infarction      Comment:  mother under age 60's  CVA and MI  No date: High cholesterol  No date: Hypertension  No date: PVC's (premature ventricular contractions)  2012: Syncope  No date: Tubular adenoma of colon  Past Surgical History:  No date: APPENDECTOMY  No date: SHOULDER SURGERY; Right  No date: TUBAL LIGATION  Patient has no known allergies.  Review of patient's family history indicates:  Problem: Lung cancer      Relation: Father          Age of Onset: (Not Specified)          Comment: lung  Problem: Breast cancer      Relation: Paternal Aunt          Age of Onset: (Not Specified)  Problem: Stroke      Relation: Mother          Age of Onset: (Not Specified)  Problem: Heart attack      Relation: Mother          Age of Onset: (Not Specified)    Social History    Socioeconomic History      Marital status:       Spouse name: Not  on file      Number of children: Not on file      Years of education: Not on file      Highest education level: Not on file    Occupational History      Not on file    Social Needs      Financial resource strain: Not on file      Food insecurity        Worry: Not on file        Inability: Not on file      Transportation needs        Medical: Not on file        Non-medical: Not on file    Tobacco Use      Smoking status: Current Some Day Smoker- states quit in 1/2021        Packs/day: 2.00        Years: 45.00        Pack years: 90      Smokeless tobacco: Never Used    Substance and Sexual Activity      Alcohol use: Not Currently      Drug use: Not Currently      Sexual activity: Not Currently    Lifestyle      Physical activity        Days per week: Not on file        Minutes per session: Not on file      Stress: Not on file    Relationships      Social connections        Talks on phone: Not on file        Gets together: Not on file        Attends Evangelical service: Not on file        Active member of club or organization: Not on file        Attends meetings of clubs or organizations: Not on file        Relationship status: Not on file      Intimate partner violence        Fear of current or ex partner: Not on file        Emotionally abused: Not on file        Physically abused: Not on file        Forced sexual activity: Not on file    Other Topics      Concerns:        Not on file    Social History Narrative      Not on file     Review of Systems:    General  Constitutional (WDL): Exceptions to WDL  Fatigue: Fatigue relieved by rest  Hot flashes/Night Sweats: Mild symptoms, no intervention needed(night sweats)  Eye Disorder (WDL): Exceptions to WDL  Dry Eye: Asymptomatic, clinical or diagnostic observations only, mild symptoms relieved by lubricants  Ear Disorder (WDL): Exceptions to WDL  Tinnitus: Mild symptoms, intervention not indicated(left ear occassionally)  Respiratory (WDL): Exceptions to WDL  Dyspnea:  Shortness of breath with minimal exertion, limiting instrumental ADL  Cardiovascular (WDL): Exceptions to WDL  Edema: Yes  Gastrointestinal (WDL): Exceptions to WDL  Constipation: Persistent symptoms with regular use of laxatives or enemas, limiting instrumental ADL  Musculoskeletal and Connetive Tissue Disorders (WDL): Exceptions to WDLArthralgia: Mild pain  Integumentary (WDL): All integumentary elements are within defined limits  Neurosensory (WDL): Exceptions to WDL  Peripheral Sensory Neuropathy: Asymptomatic, loss of deep tendon reflexes or paresthesia(mark feet)  Dizziness: Mild unsteadiness or sensation of movement  Patient Coping: Open/discussion  Hematological/Lymphatic  Lymph (WDL): All lymph disorder elements are within defined limits  Genitourinary (WDL): All genitourinary elements are within defined limits  Pain              Currently in Pain: No/denies                  Accompanied by: Family Member(sister n law)      Objective:    Physical Exam    ----------------------------------                   06/16/21                             1229            ----------------------------------   BP:              131/63            Pulse:             86              SpO2:             96%              Weight: 152 lb 14.4 oz (69.4 kg)  ----------------------------------    Gen: Alert, in NAD pleasant interactive, well nourished  Eyes: PERRL, EOMI, sclera anicteric  Pulm: No wheezing, stridor or respiratory distress  CV: Well-perfused, no cyanosis  Skin: Normal tone and turgor, warm, dry, intact  Neurologic: A/Ox3,  face symmetric, speech fluent, no focal motor deficits. Gait normal and unaided  Psychiatric: Appropriate mood and affect     Recent Labs: No results found for this or any previous visit (from the past 168 hour(s)).    All prior CT and PET imaging personally reviewed.  Imaging:   :Ct Chest With Contrast    Result Date: 6/4/2021  EXAM: CT CHEST W CONTRAST LOCATION: [facility]  [facility] DATE/TIME: 6/4/2021 10:12 AM INDICATION: Lung nodule follow up. COMPARISON: 04/04/2021. TECHNIQUE: CT chest with IV contrast. Multiplanar reformats were obtained. Dose reduction techniques were used. CONTRAST: Iopamidol (Isovue-370) 100mL FINDINGS: LUNGS AND PLEURA: No significant change of irregularly contoured right upper lobe nodule compared to more recent 2021 imaging, with measurements near 12 x 17 mm (series 3, image 68). This has increased in size and solid component since 2019, more subsolid in appearance at that time and measured near 9-10 mm. Stable prior 4 mm left upper lobe nodule. Stable extensive emphysema. Mild airway thickening. No pleural effusion. MEDIASTINUM/AXILLAE: No adenopathy. Nonaneurysmal aorta. UPPER ABDOMEN: Stable left adrenal thickening. MUSCULOSKELETAL: Bony demineralization and degenerative changes.     1.  Irregular right upper lobe nodule shows no significant change since recent imaging, though increased in size since 2019. Finding raises concern for adenocarcinoma type lesion. Unless other treatment options are amenable, recommend continued surveillance. 2.  Stable 4 mm left upper lobe nodule.     ------------------------------------------------  Nm Pet Ct Skull To Mid Thigh  Result Date: 2/4/2021  EXAM: NM PET CT SKULL TO MID THIGH LOCATION: [facility] DATE/TIME: 2/4/2021 2:21 PM INDICATION: Solitary pulmonary nodule, right upper lobe. COMPARISON: CTA of the chest abdomen pelvis dated 01/29/2021 and CT of the chest dated 04/23/2019. TECHNIQUE: Serum glucose level 134 mg/dL. One hour post intravenous administration of 9.1 mCi F-18 FDG, PET imaging was performed from the skull base to the mid thighs utilizing attenuation correction with concurrent axial CT and PET/CT image fusion. Dose reduction techniques were used. FINDINGS: Mildly FDG avid spiculated nodule in the right upper lobe measuring 1.6 x 1.1 cm (max SUV 2.1, previously  measured 1.1 x 0.9 cm on CT of the chest dated 04/23/2019) suspicious for primary lung neoplasm of low metabolic activity such as adenocarcinoma without metastasis. Mild reflux esophagitis. The remaining FDG uptake is physiologic from the skull base to mid thigh. Mild senescent intracranial changes. Postoperative change of the bilateral lenses. Mild to moderate carotid artery bifurcation calcification. Moderate coronary artery calcium. Right renal cysts. Tubal ligation. Multilevel degenerative changes of the spine.      Findings suspicious for right upper lobe adenocarcinoma without metastasis.   ------------------------------------------------  Cta Chest Abdomen Pelvis  Result Date: 1/29/2021  EXAM: CTA CHEST ABDOMEN PELVIS LOCATION: [facility] DATE/TIME: 1/29/2021 2:23 PM INDICATION: chest pain, abd pain COMPARISON: CT chest 04/23/2019 TECHNIQUE: CT angiogram chest abdomen pelvis during arterial phase of injection of IV contrast. 2D and 3D MIP reconstructions were performed by the CT technologist. Dose reduction techniques were used. CONTRAST: Iohexol (Omni) 100 mL FINDINGS: CT ANGIOGRAM CHEST, ABDOMEN, AND PELVIS: The aorta is normal in size and contour. No dissection or stenosis. Moderate atherosclerotic vascular disease. The major aortic branches in the chest, abdomen and pelvis are patent. Replaced left gastric from the aorta. No pulmonary embolus. Moderate coronary artery calcification. LUNGS AND PLEURA: Enlarging spiculated right upper lobe pulmonary nodule measuring 1.7 x 0.8 cm, previously 0.9 x 0.6 cm, image 54:6. A new 0.5 cm right lower lobe pulmonary nodule, image 103:6. A new 0.3 cm left upper lobe pulmonary nodule, image 80:6. A stable 0.3 cm solid left lower lobe pulmonary nodule, image 140:6. Severe emphysema. Stable mild diffuse bronchial wall thickening. No pleural effusion. MEDIASTINUM/AXILLAE: A stable 1.2 cm aorticopulmonary window lymph node, image 111:5. No other  lymphadenopathy. HEPATOBILIARY: Multiple arterial hyperenhancing foci in the liver with the largest measuring 1.5 x 1.5 cm in segment 4A , image 272:5. PANCREAS: Normal. SPLEEN: Normal. ADRENAL GLANDS: Normal. KIDNEYS/BLADDER: A simple cyst in the midpole right kidney, no follow-up required. Subcentimeter renal hypodensities which are too small to characterize. No hydronephrosis. BOWEL: Diverticulosis of the colon. No acute inflammatory change. No obstruction. LYMPH NODES: Normal. PELVIC ORGANS: Normal. MUSCULOSKELETAL: Remote left rib fractures. Degenerative changes of the spine and hips. No suspicious osseous lesion.      1.  No acute vascular abnormality in the chest, abdomen or pelvis. 2.  Enlarging spiculated 1.7 cm right upper lobe pulmonary nodule. This is suspicious for a primary lung cancer. Recommend PET scan. 3.  Multiple arterial hyperenhancing hepatic lesions measuring up to 1.5 cm. These may be benign or malignant. They can be further evaluated with the PET scan. If the PET scan is indeterminate, a contrast enhanced magnetic resonance imaging examination of the liver should be performed. 4.  There are 2 new subcentimeter pulmonary nodules. 5.  A stable single enlarged mediastinal lymph node. 6.  Severe emphysema. REFERENCE: Guidelines for Management of Incidental Pulmonary Nodules Detected on CT Images: From the Fleischner Society 2017. Guidelines apply to incidental nodules in patients who are 35 years or older. Guidelines do not apply to lung cancer screening, patients with immunosuppression, or patients with known primary cancer. SINGLE NODULE Nodule size >8 mm Either low or high-risk patients: Consider CT, PET/CT, or tissue sampling at 3 months. Consider referral to lung nodule clinic. NOTE: ABNORMAL REPORT THE DICTATION ABOVE DESCRIBES AN ABNORMALITY FOR WHICH FOLLOW-UP IS NEEDED.        Pathology:   No path, patient declines biopsy. Given published data on empiric radiation therapy for presumed  non-small cell lung cancer, in agreement to proceed without biopsy and fiducial.    No results found for this or any previous visit (from the past 8760 hour(s)).      Signed by: Jessica Fong MD

## 2021-06-26 NOTE — PROGRESS NOTES
"Oncology Rooming Note    06/07/21 1:25 PM    Shantel Romero is a 75 y.o. female who presents for: 6 month f/u lung cancer with CT prior    Chief Complaint   Patient presents with     HE Cancer     Lung Cancer       Initial Vitals: /70   Pulse 87   Temp 98  F (36.7  C) (Oral)   Wt 152 lb (68.9 kg)   SpO2 94%   BMI 25.29 kg/m       Estimated body mass index is 25.29 kg/m  as calculated from the following:    Height as of 3/12/21: 5' 5\" (1.651 m).    Weight as of this encounter: 152 lb (68.9 kg).     Body surface area is 1.78 meters squared.      Allergies reviewed: Yes  Medications reviewed: Yes    Refills needed: No  Pharmacy name entered into EPIC: @[email or web address]@      Clinical concerns: no concerns      Maureen Friedman RN      "

## 2021-06-26 NOTE — PROGRESS NOTES
[facility] Hematology and Oncology Progress Note    Patient: Shantel Romero  MRN: [id number]  Date of Service: 06/07/2021      Assessment and Plan:    1.  Right-sided lung mass, upper lobe: She just had another CT.  Images were personally reviewed.  When compared to imaging from 2019 there has been clear progression.  It is becoming larger and more solid.  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  I explained to them that there is no definitive size criteria at which we should proceed with treatment.  I reviewed with him that radiosurgery would be a good treatment option.  I reviewed the logistics of this treatment in general terms.  Multiple questions were answered.  After some discussion, she agrees to proceed with treatment at this time rather than waiting longer.  Referral was made to radiation oncology.  We will plan on seeing her back in 4 months with imaging.    Diagnosis:    1.  Right upper lobe lung mass: Initially seen in 2019.  Has not been biopsied.    Treatment:    Observation.    Interim History:    Cosme returns for a follow-up visit.  I first saw her in consultation 4 months ago.  She has no acute complaints today.  She has chronic dizziness thought secondary to remote concussion.  No chest pain, cough, or shortness of breath.  No acute complaints today.    Review of Systems:    As above in the history.     Review of Systems otherwise Negative for:  General: chills, fever or night sweats  Psychological: anxiety or depression  Ophthalmic: blurry vision, double vision or loss of vision, vision change  ENT: epistaxis, oral lesions, hearing changes  Hematological and Lymphatic: bleeding, bruising, jaundice, swollen lymph nodes  Endocrine: hot flashes, unexpected weight changes  Respiratory: cough, hemoptysis, orthopnea or shortness of breath/LUKE  Cardiovascular: chest pain, edema, palpitations or PND  Gastrointestinal:  abdominal pain, blood in stools, change in bowel habits, constipation, diarrhea or nausea/vomiting  Genito-Urinary: change in urinary stream, incontinence, frequency/urgency  Musculoskeletal: joint pain, stiffness, swelling, muscle pain  Neurological: dizziness, headaches, numbness/tingling  Dermatological: lumps and rash    ECOG performance status is 0    Past History:    Past Medical History:   Diagnosis Date     COPD (chronic obstructive pulmonary disease) (H)      Coronary artery disease due to lipid rich plaque 02/2021    Abnormal coronary CTA     Family history of myocardial infarction     mother under age 60's  CVA and MI     High cholesterol      Hypertension      PVC's (premature ventricular contractions)      Syncope 2012     Tubular adenoma of colon      Physical Exam:    Recent Vitals 6/7/2021   Height -   Weight 152 lbs   BSA (m2) 1.78 m2   /70   Pulse 87   Temp 98   Temp src 1   SpO2 94   Some recent data might be hidden     General: patient appears stated age of 75 y.o.. Nontoxic and in no distress.   HEENT: Head: atraumatic, normocephalic. Sclerae anicteric.  Chest:  Normal respiratory effort  Cardiac:  No edema.   Abdomen: abdomen is soft, non-distended  Extremities: normal tone and muscle bulk.  Skin: no lesions or rash. Warm and dry.   CNS: alert and oriented. Grossly non-focal.   Psychiatric: normal mood and affect.     Lab Results:    Recent Results (from the past 168 hour(s))   POCT CREATININE   Result Value Ref Range    iSTAT Creatinine 0.7 0.6 - 1.1 mg/dL    iSTAT GFR MDRD Af Amer >60 >60 mL/min/1.73m2    iSTAT GFR MDRD Non Af Amer >60 >60 mL/min/1.73m2   Comprehensive Metabolic Panel   Result Value Ref Range    Sodium 138 136 - 145 mmol/L    Potassium 4.4 3.5 - 5.0 mmol/L    Chloride 106 98 - 107 mmol/L    CO2 27 22 - 31 mmol/L    Anion Gap, Calculation 5 5 - 18 mmol/L    Glucose 100 70 - 125 mg/dL    BUN 13 8 - 28 mg/dL    Creatinine 0.72 0.60 - 1.10 mg/dL    GFR MDRD Af Amer >60 >60  mL/min/1.73m2    GFR MDRD Non Af Amer >60 >60 mL/min/1.73m2    Bilirubin, Total 0.5 0.0 - 1.0 mg/dL    Calcium 10.1 8.5 - 10.5 mg/dL    Protein, Total 6.7 6.0 - 8.0 g/dL    Albumin 3.9 3.5 - 5.0 g/dL    Alkaline Phosphatase 56 45 - 120 U/L    AST 21 0 - 40 U/L    ALT 19 0 - 45 U/L   HM1 (CBC with Diff)   Result Value Ref Range    WBC 5.9 4.0 - 11.0 thou/uL    RBC 4.12 3.80 - 5.40 mill/uL    Hemoglobin 13.3 12.0 - 16.0 g/dL    Hematocrit 40.7 35.0 - 47.0 %    MCV 99 80 - 100 fL    MCH 32.3 27.0 - 34.0 pg    MCHC 32.7 32.0 - 36.0 g/dL    RDW 13.2 11.0 - 14.5 %    Platelets 246 140 - 440 thou/uL    MPV 10.9 8.5 - 12.5 fL    Neutrophils % 63 50 - 70 %    Lymphocytes % 28 20 - 40 %    Monocytes % 7 2 - 10 %    Eosinophils % 1 0 - 6 %    Basophils % 1 0 - 2 %    Immature Granulocyte % 0 <=0 %    Neutrophils Absolute 3.7 2.0 - 7.7 thou/uL    Lymphocytes Absolute 1.7 0.8 - 4.4 thou/uL    Monocytes Absolute 0.4 0.0 - 0.9 thou/uL    Eosinophils Absolute 0.1 0.0 - 0.4 thou/uL    Basophils Absolute 0.0 0.0 - 0.2 thou/uL    Immature Granulocyte Absolute 0.0 <=0.0 thou/uL     Imaging:    Ct Chest With Contrast    Result Date: 6/4/2021  EXAM: CT CHEST W CONTRAST LOCATION: [facility] DATE/TIME: 6/4/2021 10:12 AM INDICATION: Lung nodule follow up. COMPARISON: 04/04/2021. TECHNIQUE: CT chest with IV contrast. Multiplanar reformats were obtained. Dose reduction techniques were used. CONTRAST: Iopamidol (Isovue-370) 100mL FINDINGS: LUNGS AND PLEURA: No significant change of irregularly contoured right upper lobe nodule compared to more recent 2021 imaging, with measurements near 12 x 17 mm (series 3, image 68). This has increased in size and solid component since 2019, more subsolid in appearance at that time and measured near 9-10 mm. Stable prior 4 mm left upper lobe nodule. Stable extensive emphysema. Mild airway thickening. No pleural effusion. MEDIASTINUM/AXILLAE: No adenopathy. Nonaneurysmal aorta. UPPER  ABDOMEN: Stable left adrenal thickening. MUSCULOSKELETAL: Bony demineralization and degenerative changes.     1.  Irregular right upper lobe nodule shows no significant change since recent imaging, though increased in size since 2019. Finding raises concern for adenocarcinoma type lesion. Unless other treatment options are amenable, recommend continued surveillance. 2.  Stable 4 mm left upper lobe nodule.       Signed by: Timoteo Barnhart MD

## 2021-06-27 ENCOUNTER — HEALTH MAINTENANCE LETTER (OUTPATIENT)
Age: 76
End: 2021-06-27

## 2021-06-30 NOTE — PROGRESS NOTES
Progress Notes by Shivam Denise MD at 3/12/2021  3:30 PM     Author: Shivam Denise MD Service: -- Author Type: Physician    Filed: 3/21/2021  5:40 PM Encounter Date: 3/12/2021 Status: Addendum    : Shivam Denise MD (Physician)    Related Notes: Original Note by Shivam Denise MD (Physician) filed at 3/12/2021  5:23 PM             [facility] Progress Note    Assessment:  1.  Epigastric discomfort.  Recent CT angiogram does not suggest significant epicardial coronary stenoses but does have significant coronary calcification.  Question if this represents microvascular angina.  We discussed this at length.  We are going to try low-dose nitroglycerin.  I suggested isosorbide but after discussion she preferred to trial a patch therefore gave her a prescription for 0.1 mg nitroglycerin patch placed each morning removed in the evening.  We talked about potential side effects of lightheadedness and headache and to update me with any symptom concerns.  I will asked that she follow-up in 2 to 3 weeks.  In addition we are going to have her utilize a 24-hour Holter monitor.    2.  Emphysema which by CT scan was stated to be severe.  Will defer a follow-up and treatment recommendations to her primary care provider.    3.  Risk factor modification.  She continues to remain tobacco free.  I would like to see her discontinue the nicotine gum.  LDL cholesterol in October 2020 reported at 85.  I would like to see the LDL less than 70 or going to plan follow-up lipids.  Currently is on 40 mg daily.      Plan: 1.  Trial of long-acting nitrates for epigastric discomfort.  I asked that she follow-up with her primary care physician to consider noncardiac causes as well.  24-hour Holter monitor.  Fasting lipid panel with further recommendations pending the outcome of the above.  Follow-up in 2 to 3 weeks with nurse practitioner.    1. CAD (coronary artery disease)  nitroglycerin (NITRODUR) 0.1 mg/hr     Holter monitor - 24 hour    Lipid Profile         An After Visit Summary was printed and given to the patient.    Subjective:    Shantel Romero is a 75 y.o. female who returned for a planned  follow up visit.  Patient reports some episodic discomfort in her upper epigastrium that does appear to be associated with exertion.  She states that sometimes that she has difficulty breathing and activity because of this discomfort.  There is associated shortness of breath but the discomfort does not radiate.  There is been no significant dizziness or lightheadedness.  No clear symptoms of orthopnea.  Recent admission in February 2021 for similar symptoms with negative troponins and subsequent CT coronary angiogram that demonstrates elevated coronary calcium score 507 but no obstructive coronary artery disease.  He does have a chronic tobacco history and is now utilizing a nicotine gum but not smoking.    Review of Systems:   General: WNL  Eyes: WNL  Ears/Nose/Throat: WNL  Lungs: WNL  Heart: Chest Pain, Shortness of Breath with activity  Stomach: WNL  Bladder: Frequent Urination at Night  Muscle/Joints: Joint Pain  Skin: WNL  Nervous System: Dizziness, Loss of Balance  Mental Health: WNL     Blood: WNL      Problem List:    Patient Active Problem List   Diagnosis   ? Premature Menopause   ? Osteopenia   ? Chest pain   ? Dyspnea on exertion   ? Coronary artery disease due to lipid rich plaque   ? Hypertension, unspecified type   ? Tobacco abuse   ? Mass of upper lobe of right lung       Social History     Socioeconomic History   ? Marital status:      Spouse name: Not on file   ? Number of children: Not on file   ? Years of education: Not on file   ? Highest education level: Not on file   Occupational History   ? Not on file   Social Needs   ? Financial resource strain: Not on file   ? Food insecurity     Worry: Not on file     Inability: Not on file   ? Transportation needs     Medical: Not on file      Non-medical: Not on file   Tobacco Use   ? Smoking status: Current Some Day Smoker     Packs/day: 2.00     Years: 45.00     Pack years: 90.00   ? Smokeless tobacco: Never Used   Substance and Sexual Activity   ? Alcohol use: Not Currently   ? Drug use: Not Currently   ? Sexual activity: Not Currently   Lifestyle   ? Physical activity     Days per week: Not on file     Minutes per session: Not on file   ? Stress: Not on file   Relationships   ? Social connections     Talks on phone: Not on file     Gets together: Not on file     Attends Hinduism service: Not on file     Active member of club or organization: Not on file     Attends meetings of clubs or organizations: Not on file     Relationship status: Not on file   ? Intimate partner violence     Fear of current or ex partner: Not on file     Emotionally abused: Not on file     Physically abused: Not on file     Forced sexual activity: Not on file   Other Topics Concern   ? Not on file   Social History Narrative   ? Not on file       Family History   Problem Relation Age of Onset   ? Lung cancer Father         lung   ? Breast cancer Paternal Aunt    ? Stroke Mother    ? Heart attack Mother        Current Outpatient Medications   Medication Sig Dispense Refill   ? acetaminophen (TYLENOL) 500 MG tablet Take 1-2 tablets (500-1,000 mg total) by mouth every 4 (four) hours as needed.  0   ? aspirin 81 MG EC tablet Take 1 tablet (81 mg total) by mouth daily.  0   ? atorvastatin (LIPITOR) 40 MG tablet Take 40 mg by mouth at bedtime.     ? cholecalciferol, vitamin D3, 250 mcg (10,000 unit) Tab Take 10,000 Units by mouth daily.      ? HEMP OIL OR EXTRACT OR OTHER CBD CANNABINOID, NOT MEDICAL CANNABIS, Take by mouth every morning. 1/2 dropper for back pain     ? ibandronate (BONIVA) 150 mg tablet Take 150 mg by mouth every 30 (thirty) days. Take in AM with glass of water prior to food, don't lie down for 30 minutes.     ? lisinopril (PRINIVIL,ZESTRIL) 10 MG tablet Take 5 mg  "by mouth every evening.      ? multivitamin with minerals tablet Take 1 tablet by mouth daily.      ? nicotine polacrilex (NICORETTE) 4 MG gum Apply 4 mg to the mouth or throat as needed for smoking cessation.     ? OMEGA-3/DHA/EPA/FISH OIL (FISH OIL-OMEGA-3 FATTY ACIDS) 300-1,000 mg capsule Take 1 g by mouth daily.      ? tiotropium (SPIRIVA) 18 mcg inhalation capsule Place 18 mcg into inhaler and inhale every evening.      ? UNABLE TO FIND Take 1 tablet by mouth daily. Med Name: pine bark     ? UNABLE TO FIND Take 1 tablet by mouth daily. Med Name: curcumioids form Turmeric     ? nitroglycerin (NITRODUR) 0.1 mg/hr Place 1 patch on the skin daily. 30 patch 3     No current facility-administered medications for this visit.        Objective:     /76 (Patient Site: Right Arm, Patient Position: Sitting, Cuff Size: Adult Regular)   Pulse 68   Resp 16   Ht 5' 5\" (1.651 m)   Wt 152 lb (68.9 kg)   BMI 25.29 kg/m    152 lb (68.9 kg)   [unfilled]  Wt Readings from Last 3 Encounters:   21 152 lb (68.9 kg)   21 151 lb (68.5 kg)   21 154 lb (69.9 kg)       Physical Exam:    GENERAL APPEARANCE: alert, no apparent distress  HEENT: no scleral icterus or xanthelasma  NECK: jugular venous pressure within normal limits  CHEST: symmetric, the lungs are managed bilaterally  CARDIOVASCULAR: regular rhythm with soft systolic murmur; no carotid bruits  Abdomen: No Organomegaly, masses, bruits, or tenderness. Bowels sounds are present      EXTREMITIES: no cyanosis, clubbing or edema    Cardiac Testing:  Echo Complete  Order# [id number]  Reading physician: Shivam Denise MD Ordering physician: Sky Mccarty MD Study date: 21   Performing Date Performing Department   2021  CARDIAC TESTING [[id number]]   Patient Information    Patient Name   Shantel Romero MRN   [id number] Sex   Female  1   1945 (75 y.o.)   Indications    Chest pain, chest pressure, chest tightness "   Summary      No previous study for comparison.    Normal left ventricular size. Mild left ventricular hypertrophy    Left ventricle ejection fraction is normal. The calculated left ventricular ejection fraction is 67%.    Possible mild right ventricular enlargement.    Normal right ventricular systolic function.    Mild right atrial enlargement.    Aortic valve sclerosis without evidence for significant aortic stenosis. Mild aortic insufficiency.    Trace pericardial effusion suggested        NM Pharmacologic Stress Test  Order# [id number]  Reading physician: Liza Coe MD Ordering physician: Feliberto Ty MD Study date: 17   Patient Information    Patient Name   Shantel Romero MRN   [id number] Sex   Female  1   1945 (71 y.o.)   EKG Scan     Stress Test Data - Scan on 17 10:36 AM by    Indications    Chest pain   Conclusion      1.The pharmacologic nuclear stress test is abnormal.    2.There is a small area of ischemia in the anterior and apical segment(s) of the left ventricle. This most likely represents false positive and is truly breast attenuation. No scar suggested.    3.The left ventricular ejection fraction is 72%.    4.The patient is at a low risk of future cardiac ischemic events.    5.When compared to the images of 2013, the small area of apical ischemia appears to be new.      CTA Coronary W Calcium Score  Order# [id number]  Reading physician: Chante Chapa MD Ordering physician: Shivam Denise MD Study date: 21   Patient Information    Patient Name   Shantel Romero MRN   [id number] Sex   Female  1   1945 (75 y.o.)   Indications    Chest pain, chronic, high prob of CAD   Interpretation Summary      The total Agatston calcium score is 507. A calcium score in this range places the individual in the 100th percentile when compared to an age and gender matched control group and implies a very high risk of cardiac events in the next ten  years.    Right dominant coronary artery system.    Mild nonobstructive coronary artery disease in the left main, left anterior descending artery and circumflex artery.        MUSE DIAGNOSIS  ECG 12 lead nursing unit performed  Collected: 01/29/21 1241   Result status: Final   Resulting lab: HE MUSE   Value: Sinus tachycardia with frequent Premature ventricular complexes   Nonspecific ST abnormality   Abnormal ECG   No previous ECGs available   Confirmed by MuseAdmin, MuseAdmin ([id number]) on 2/1/2021 11:32:13 AM              Lab Results:    Lab Results   Component Value Date     02/01/2021    K 4.4 02/01/2021     02/01/2021    CO2 27 02/01/2021    BUN 10 02/01/2021    CREATININE 0.57 (L) 02/01/2021    CALCIUM 9.2 02/01/2021     Lab Results   Component Value Date    CHOL 173 10/09/2020    TRIG 74 10/09/2020    HDL 73 10/09/2020     No results found for: BNP  Creatinine (mg/dL)   Date Value   02/01/2021 0.57 (L)   01/29/2021 0.68   10/09/2020 0.72   04/06/2020 0.67     LDL Calculated (mg/dL)   Date Value   10/09/2020 85   04/06/2020 69   10/07/2019 70     Lab Results   Component Value Date    WBC 7.8 01/29/2021    HGB 14.1 01/29/2021    HCT 42.0 01/29/2021    MCV 97 01/29/2021     01/31/2021         This note has been dictated using voice recognition software. Any grammatical or context distortions are unintentional and inherent to the software.

## 2021-07-02 ENCOUNTER — AMBULATORY - HEALTHEAST (OUTPATIENT)
Dept: LAB | Facility: CLINIC | Age: 76
End: 2021-07-02

## 2021-07-02 DIAGNOSIS — C34.11 MALIGNANT NEOPLASM OF UPPER LOBE OF RIGHT LUNG (H): ICD-10-CM

## 2021-07-03 ENCOUNTER — COMMUNICATION - HEALTHEAST (OUTPATIENT)
Dept: SCHEDULING | Facility: CLINIC | Age: 76
End: 2021-07-03

## 2021-07-03 LAB
SARS-COV-2 PCR COMMENT: NORMAL
SARS-COV-2 RNA SPEC QL NAA+PROBE: NEGATIVE
SARS-COV-2 VIRUS SPECIMEN SOURCE: NORMAL

## 2021-07-04 ENCOUNTER — COMMUNICATION - HEALTHEAST (OUTPATIENT)
Dept: SCHEDULING | Facility: CLINIC | Age: 76
End: 2021-07-04

## 2021-07-05 PROBLEM — C34.11 MALIGNANT NEOPLASM OF UPPER LOBE OF RIGHT LUNG (H): Status: ACTIVE | Noted: 2021-06-16

## 2021-07-06 VITALS
BODY MASS INDEX: 25.29 KG/M2 | SYSTOLIC BLOOD PRESSURE: 122 MMHG | WEIGHT: 152 LBS | TEMPERATURE: 98 F | OXYGEN SATURATION: 94 % | DIASTOLIC BLOOD PRESSURE: 70 MMHG | HEART RATE: 87 BPM

## 2021-07-06 VITALS
SYSTOLIC BLOOD PRESSURE: 131 MMHG | DIASTOLIC BLOOD PRESSURE: 63 MMHG | BODY MASS INDEX: 25.44 KG/M2 | WEIGHT: 152.9 LBS | OXYGEN SATURATION: 96 % | HEART RATE: 86 BPM

## 2021-07-13 ENCOUNTER — RECORDS - HEALTHEAST (OUTPATIENT)
Dept: ADMINISTRATIVE | Facility: CLINIC | Age: 76
End: 2021-07-13

## 2021-07-21 ENCOUNTER — RECORDS - HEALTHEAST (OUTPATIENT)
Dept: ADMINISTRATIVE | Facility: CLINIC | Age: 76
End: 2021-07-21

## 2021-07-22 ENCOUNTER — RECORDS - HEALTHEAST (OUTPATIENT)
Dept: SCHEDULING | Facility: CLINIC | Age: 76
End: 2021-07-22

## 2021-07-22 DIAGNOSIS — Z12.31 OTHER SCREENING MAMMOGRAM: ICD-10-CM

## 2021-07-27 ENCOUNTER — DOCUMENTATION ONLY (OUTPATIENT)
Dept: RADIATION ONCOLOGY | Facility: CLINIC | Age: 76
End: 2021-07-27

## 2021-07-27 NOTE — PROGRESS NOTES
I spoke with Cosme regarding her status post RT. She talked about fatigue and wanted to know how long it will last. I told her that everyone is really different but the more active she can be, the better she will feel. She responded that she has been having so much pain in her legs so walking is difficult. She did see her pcp but was not impressed with his care. She said she comes away not knowing any more than when she walked in. But she did say she is doing ok after RT other than the fatigue. She is anxious to see her scan in Oct and questioned about it taking that long. I used a cake analogy, you don't want to look at your cake until it is done cooking. She laughed and she said she looks at her cakes before they are done. She denies any needs at this time. I encouraged her to call if she is in need. Danielle RN, OCN, CBCN

## 2021-07-29 ENCOUNTER — OFFICE VISIT (OUTPATIENT)
Dept: CARDIOLOGY | Facility: CLINIC | Age: 76
End: 2021-07-29
Payer: MEDICARE

## 2021-07-29 VITALS
SYSTOLIC BLOOD PRESSURE: 126 MMHG | DIASTOLIC BLOOD PRESSURE: 74 MMHG | WEIGHT: 154.3 LBS | RESPIRATION RATE: 16 BRPM | BODY MASS INDEX: 25.68 KG/M2 | HEART RATE: 68 BPM

## 2021-07-29 DIAGNOSIS — I25.83 CORONARY ARTERY DISEASE DUE TO LIPID RICH PLAQUE: Primary | ICD-10-CM

## 2021-07-29 DIAGNOSIS — I25.10 CORONARY ARTERY DISEASE DUE TO LIPID RICH PLAQUE: Primary | ICD-10-CM

## 2021-07-29 PROCEDURE — 99214 OFFICE O/P EST MOD 30 MIN: CPT | Performed by: INTERNAL MEDICINE

## 2021-07-29 RX ORDER — MULTIPLE VITAMINS W/ MINERALS TAB 9MG-400MCG
1 TAB ORAL
COMMUNITY
End: 2022-08-08

## 2021-07-29 RX ORDER — IBUPROFEN 800 MG/1
800 TABLET, FILM COATED ORAL EVERY 8 HOURS PRN
COMMUNITY
End: 2021-12-30

## 2021-07-29 RX ORDER — NICOTINE 21 MG/24HR
1 PATCH, TRANSDERMAL 24 HOURS TRANSDERMAL
COMMUNITY
End: 2021-07-29

## 2021-07-29 RX ORDER — NITROGLYCERIN 0.1MG/HR
1 PATCH, TRANSDERMAL 24 HOURS TRANSDERMAL DAILY
Qty: 90 PATCH | Refills: 3 | Status: SHIPPED | OUTPATIENT
Start: 2021-07-29 | End: 2022-08-08

## 2021-07-29 NOTE — PATIENT INSTRUCTIONS
We talked about a primary care physician named Dr Estrada on the [facility],Please call if the chest pressure symptoms become more significant because there is room to increase the medication.My nurse is Chica and her number is [phone]. We discussed a device called [employer] to track your heart rhythm.Let me know if you get it,

## 2021-07-29 NOTE — LETTER
7/29/2021    LUCRETIA ROSARIO MD  [facility] [address]  [address] [facility] [address]    RE: Shantel RAMSAY Mosesjaspreetphillip       Dear Colleague,    I had the pleasure of seeing Shantel Romero in the [facility].      HEART CARE ENCOUNTER CONSULTATON NOTE      [facility]  [phone]      Assessment/Recommendations   Assessment/Plan:  1.  Coronary artery disease.  Nonobstructive disease based on CT angiography but concerned that she does have microvascular angina.  She had been describing epigastric discomfort that is improved with the use of nitroglycerin.  I initially recommended isosorbide mononitrate but her preference was for the nitroglycerin patch but she continues to utilize.  Symptom is much improved.  We discussed whether we would increase the patch to 0.2 mg but at this point she does not want to make any changes as she is feeling quite well in this regard.  I suspect that the chronic breathlessness is factorial and in part related to underlying lung issues.  Echocardiogram from January 2021 reveals normal left ventricular function, normal right ventricular function with trace pericardial effusion.    2.  COPD.  She has had prior CT scans that suggest extensive emphysema.  Will defer further evaluation and management of COPD to her primary care provider.    3.  Risk modification.  Blood pressures appear to be under good control.  She is not smoking at this time.  Most recent cholesterol panels are from March 2021 at which time the LDL was 70, HDL 74, total cholesterol 160 with normal liver function tests.  He is on 40 mg of atorvastatin.    4.  She mentions occasional rapid heartbeat at night.  We did talk about use of an event monitor but she would like to hold on this at this time.  She will update me if any progressive symptoms.  Holter monitor from March 2021 reported sinus rhythm to sinus tachycardia without significant  dysrhythmia.    5.  Burning discomfort of the right lower extremity.  Recently started on ibuprofen by her primary care physician we did talk about the potential side effects of ibuprofen.  She will keep her primary care physician updated.    Follow-up in 6 months continue the current cardiovascular regimen.       History of Present Illness/Subjective    HPI: Shantel Romero is a 76 year old female who was seen in follow-up.  We met in consultation in March 2021 with chronic symptoms of shortness of breath with exertion with episodic discomfort in her upper epigastrium.  She had a coronary CT angiogram that demonstrated no obstructive coronary stenosis with a significant elevation in coronary calcium score 507.  She has a tobacco history and utilizes nicotine gum.  In the interim she is undergoing radiation therapy for a right-sided lung mass.  She reports that the epigastric discomfort has significantly improved with low-dose nitroglycerin patch that we placed during our previous visit.  She states that the symptom occurs infrequently.  She endorses some chronic breathlessness with exertion with underlying lung issues.  She does not describe orthopnea or PND.  I reviewed the test results with her including the echocardiogram and CT angiogram as well as laboratory studies.    Recent Echocardiogram Results:    Contains abnormal data Echocardiogram Complete  Order: [id number]  Status:  Final result   Visible to patient:  Yes (MyChart) Next appt:  07/29/2021 at 01:10 PM in Cardiology (Shivam Denise MD)   0 Result Notes  Details    Reading Physician Reading Date Result Priority   Shivam Denise MD  [phone] 1/30/2021 Routine   Provider, Historical 1/30/2021       Narrative & Impression    No previous study for comparison.    Normal left ventricular size. Mild left ventricular hypertrophy    Left ventricle ejection fraction is normal. The calculated left ventricular ejection fraction is 67%.    Possible mild  right ventricular enlargement.    Normal right ventricular systolic function.    Mild right atrial enlargement.    Aortic valve sclerosis without evidence for significant aortic stenosis. Mild aortic insufficiency.    Trace pericardial effusion suggested               Conclusion    [facility]     HOLTER REPORT     Results:    Indication for study: Coronary artery disease    The predominant rhythm throughout the tracing was normal sinus rhythm with an average heart rate of 83 bpm.  The chronotropic response to activities of daily living appears to be normal.  The TX interval was normal.  The QRS duration was normal.  The   QT interval was normal.  The study demonstrated no significant bradycardia/pauses.    The patient demonstrated atrial ectopy which was mildly increased at 1.1%.  Nonsustained ectopic atrial tachycardia was not observed.  Sustained ectopic atrial tachycardia, atrial fibrillation, or other supraventricular tachycardia was not observed.    The patient demonstrated rare ventricular ectopy.  Complex ventricular ectopy was  not observed.  Sustained ventricular tachycardia was not observed.    The patient did return a diary.  Patient reported symptoms of chest pain and dizziness on 2 occasions.  All 3 recordings demonstrated sinus rhythm after sinus tachycardia with heart rates ranging between 89 and 104 bpm.  Each of the recordings   demonstrated a single isolated PVC with no other ectopy or pathologic rhythm disturbance.     Impression:    Borderline Holter monitor with evidence of mildly increased atrial ectopy.  There was no sustained atrial arrhythmia.    Indication for study: Coronary artery disease.  Patient's symptoms of chest pain corresponded to mild sinus tachycardia with a isolated PVC.    Dizziness corresponded to sinus rhythm and a mild sinus tachycardia.  It is the reader's impression that the symptoms are out of proportion to the degree of ectopy.    No sustained atrial or  ventricular tachyarrhythmia.    No profound bradycardia or pauses.        Comment: The recording was for 23 hrs and 59 min.  The recording quality was adequate.      Exam Information    Exam Date Exam Time Exam Date Exam Time Accession # Performing Department Results    2/1/21 12:00 AM 2/1/21 12:01 PM [id number] [facility] [place]        [id number]     PACS Images     Show images for CTA Angiogram coronary artery   Study Result    Narrative & Impression     The total Agatston calcium score is 507. A calcium score in this range places the individual in the 100th percentile when compared to an age and gender matched control group and implies a very high risk of cardiac events in the next ten years.    Right dominant coronary artery system.    Mild nonobstructive coronary artery disease in the left main, left anterior descending artery and circumflex artery.               Physical Examination  Review of Systems   Vitals: There were no vitals taken for this visit.  BMI= There is no height or weight on file to calculate BMI.  Wt Readings from Last 3 Encounters:   07/09/21 68.8 kg (151 lb 11.2 oz)   06/16/21 69.4 kg (152 lb 14.4 oz)   06/07/21 68.9 kg (152 lb)       General Appearance:   no distress, normal body habitus   ENT/Mouth: membranes moist, no oral lesions or bleeding gums.      EYES:  no scleral icterus, normal conjunctivae   Neck: no carotid bruits or thyromegaly   Chest/Lungs:    Decreased breath sounds bilaterally without rales detected., equal chest wall expansion    Cardiovascular:    Distant, regular. Normal first and second heart sounds with no definite murmurs, rubs, or gallops; the carotid, radial and posterior tibial pulses are intact, Jugular venous pressure is within normal limits, no edema bilaterally    Abdomen:  no organomegaly, masses, bruits, or tenderness; bowel sounds are present   Extremities: no cyanosis or clubbing   Skin: no xanthelasma, warm.     Neurologic: normal  bilateral, no tremors     Psychiatric: alert and oriented x3, calm        Please refer above for cardiac ROS details.        Medical History  Surgical History Family History Social History   Past Medical History:   Diagnosis Date     COPD (chronic obstructive pulmonary disease) (H)      Coronary artery disease due to lipid rich plaque 02/2021    Abnormal coronary CTA     Family history of myocardial infarction     mother under age 60's  CVA and MI     High cholesterol      Hypertension      PVC's (premature ventricular contractions)      Syncope 2012     Tubular adenoma of colon      Past Surgical History:   Procedure Laterality Date     APPENDECTOMY       SHOULDER SURGERY Right      TUBAL LIGATION       Family History   Problem Relation Age of Onset     Lung Cancer Father         lung     Breast Cancer Paternal Aunt      Cerebrovascular Disease Mother      Coronary Artery Disease Mother         Social History     Socioeconomic History     Marital status:      Spouse name: Not on file     Number of children: Not on file     Years of education: Not on file     Highest education level: Not on file   Occupational History     Not on file   Tobacco Use     Smoking status: Current Every Day Smoker   Substance and Sexual Activity     Alcohol use: Not on file     Drug use: Not on file     Sexual activity: Not on file   Other Topics Concern     Parent/sibling w/ CABG, MI or angioplasty before 65F 55M? Not Asked   Social History Narrative     Not on file     Social Determinants of Health     Financial Resource Strain:      Difficulty of Paying Living Expenses:    Food Insecurity:      Worried About Running Out of Food in the Last Year:      Ran Out of Food in the Last Year:    Transportation Needs:      Lack of Transportation (Medical):      Lack of Transportation (Non-Medical):    Physical Activity:      Days of Exercise per Week:      Minutes of Exercise per Session:    Stress:      Feeling  of Stress :    Social Connections:      Frequency of Communication with Friends and Family:      Frequency of Social Gatherings with Friends and Family:      Attends Alevism Services:      Active Member of Clubs or Organizations:      Attends Club or Organization Meetings:      Marital Status:    Intimate Partner Violence:      Fear of Current or Ex-Partner:      Emotionally Abused:      Physically Abused:      Sexually Abused:            Medications  Allergies   Current Outpatient Medications   Medication Sig Dispense Refill     acetaminophen (TYLENOL) 500 MG tablet Take 500-1,000 mg by mouth every 8 hours as needed       albuterol (PROAIR HFA/PROVENTIL HFA/VENTOLIN HFA) 108 (90 Base) MCG/ACT inhaler Inhale 2 puffs into the lungs       aspirin (ASA) 81 MG EC tablet Take 81 mg by mouth daily       atorvastatin (LIPITOR) 40 MG tablet Take 40 mg by mouth At Bedtime       Cholecalciferol 250 MCG (03726 UT) TABS Take 10,000 Units by mouth daily       fluticasone (FLONASE) 50 MCG/ACT nasal spray Spray 2 sprays in nostril daily       IBANdronate (BONIVA) 150 MG tablet Take 150 mg by mouth every 30 days       lisinopril (ZESTRIL) 5 MG tablet Take 5 mg by mouth daily       Multiple Vitamin (MULTIVITAMIN ADULT PO) Take 1 tablet by mouth       nicotine (NICORETTE) 2 MG gum Take 2 mg by mouth       nitroGLYcerin (NITRODUR) 0.1 MG/HR 24 hr patch Place 1 patch onto the skin daily       Omega-3 1000 MG capsule Take 1 g by mouth daily       tiotropium (SPIRIVA) 18 MCG inhaled capsule Inhale 18 mcg into the lungs daily       No Known Allergies       Lab Results    Chemistry/lipid CBC Cardiac Enzymes/BNP/TSH/INR   Recent Labs   Lab Test 03/19/21  1309   CHOL 160   HDL 74   LDL 70   TRIG 82     Recent Labs   Lab Test 03/19/21  1309 10/09/20  1415 04/06/20  1414   LDL 70 85 69     Recent Labs   Lab Test 06/07/21  1309      POTASSIUM 4.4   CHLORIDE 106   CO2 27      BUN 13   CR 0.72   GFRESTIMATED >60   TRACE 10.1      Recent Labs   Lab Test 06/07/21  1309 06/04/21  1004 02/01/21  0522   CR 0.72 0.7 0.57*     No results for input(s): A1C in the last 68801 hours.       Recent Labs   Lab Test 06/07/21  1309   WBC 5.9   HGB 13.3   HCT 40.7   MCV 99        Recent Labs   Lab Test 06/07/21  1309 01/29/21  1307 04/06/20  1414   HGB 13.3 14.1 13.6    Recent Labs   Lab Test 01/30/21  0602 01/30/21  0023 01/29/21  1845   TROPONINI <0.01 <0.01 0.01     No results for input(s): BNP, NTBNPI, NTBNP in the last 83276 hours.  Recent Labs   Lab Test 10/07/19  1602   TSH 1.55     Recent Labs   Lab Test 01/29/21  1307   INR 1.00        Shivam Denise MD                                        Thank you for allowing me to participate in the care of your patient.      Sincerely,     Shivam Denise MD     [facility]  cc:   No referring provider defined for this encounter.

## 2021-07-29 NOTE — PROGRESS NOTES
HEART CARE ENCOUNTER CONSULTATON NOTE      [facility]  [phone]      Assessment/Recommendations   Assessment/Plan:  1.  Coronary artery disease.  Nonobstructive disease based on CT angiography but concerned that she does have microvascular angina.  She had been describing epigastric discomfort that is improved with the use of nitroglycerin.  I initially recommended isosorbide mononitrate but her preference was for the nitroglycerin patch but she continues to utilize.  Symptom is much improved.  We discussed whether we would increase the patch to 0.2 mg but at this point she does not want to make any changes as she is feeling quite well in this regard.  I suspect that the chronic breathlessness is factorial and in part related to underlying lung issues.  Echocardiogram from January 2021 reveals normal left ventricular function, normal right ventricular function with trace pericardial effusion.    2.  COPD.  She has had prior CT scans that suggest extensive emphysema.  Will defer further evaluation and management of COPD to her primary care provider.    3.  Risk modification.  Blood pressures appear to be under good control.  She is not smoking at this time.  Most recent cholesterol panels are from March 2021 at which time the LDL was 70, HDL 74, total cholesterol 160 with normal liver function tests.  He is on 40 mg of atorvastatin.    4.  She mentions occasional rapid heartbeat at night.  We did talk about use of an event monitor but she would like to hold on this at this time.  She will update me if any progressive symptoms.  Holter monitor from March 2021 reported sinus rhythm to sinus tachycardia without significant dysrhythmia.    5.  Burning discomfort of the right lower extremity.  Recently started on ibuprofen by her primary care physician we did talk about the potential side effects of ibuprofen.  She will keep her primary care physician updated.    Follow-up in 6 months continue  the current cardiovascular regimen.       History of Present Illness/Subjective    HPI: Shantel Romero is a 76 year old female who was seen in follow-up.  We met in consultation in March 2021 with chronic symptoms of shortness of breath with exertion with episodic discomfort in her upper epigastrium.  She had a coronary CT angiogram that demonstrated no obstructive coronary stenosis with a significant elevation in coronary calcium score 507.  She has a tobacco history and utilizes nicotine gum.  In the interim she is undergoing radiation therapy for a right-sided lung mass.  She reports that the epigastric discomfort has significantly improved with low-dose nitroglycerin patch that we placed during our previous visit.  She states that the symptom occurs infrequently.  She endorses some chronic breathlessness with exertion with underlying lung issues.  She does not describe orthopnea or PND.  I reviewed the test results with her including the echocardiogram and CT angiogram as well as laboratory studies.    Recent Echocardiogram Results:    Contains abnormal data Echocardiogram Complete  Order: [id number]  Status:  Final result   Visible to patient:  Yes (MyChart) Next appt:  07/29/2021 at 01:10 PM in Cardiology (Shivam Denise MD)   0 Result Notes  Details    Reading Physician Reading Date Result Priority   Shivam Denise MD  [phone] 1/30/2021 Routine   Provider, Historical 1/30/2021       Narrative & Impression    No previous study for comparison.    Normal left ventricular size. Mild left ventricular hypertrophy    Left ventricle ejection fraction is normal. The calculated left ventricular ejection fraction is 67%.    Possible mild right ventricular enlargement.    Normal right ventricular systolic function.    Mild right atrial enlargement.    Aortic valve sclerosis without evidence for significant aortic stenosis. Mild aortic insufficiency.    Trace pericardial effusion suggested                Conclusion    [facility]     HOLTER REPORT     Results:    Indication for study: Coronary artery disease    The predominant rhythm throughout the tracing was normal sinus rhythm with an average heart rate of 83 bpm.  The chronotropic response to activities of daily living appears to be normal.  The PA interval was normal.  The QRS duration was normal.  The   QT interval was normal.  The study demonstrated no significant bradycardia/pauses.    The patient demonstrated atrial ectopy which was mildly increased at 1.1%.  Nonsustained ectopic atrial tachycardia was not observed.  Sustained ectopic atrial tachycardia, atrial fibrillation, or other supraventricular tachycardia was not observed.    The patient demonstrated rare ventricular ectopy.  Complex ventricular ectopy was  not observed.  Sustained ventricular tachycardia was not observed.    The patient did return a diary.  Patient reported symptoms of chest pain and dizziness on 2 occasions.  All 3 recordings demonstrated sinus rhythm after sinus tachycardia with heart rates ranging between 89 and 104 bpm.  Each of the recordings   demonstrated a single isolated PVC with no other ectopy or pathologic rhythm disturbance.     Impression:    Borderline Holter monitor with evidence of mildly increased atrial ectopy.  There was no sustained atrial arrhythmia.    Indication for study: Coronary artery disease.  Patient's symptoms of chest pain corresponded to mild sinus tachycardia with a isolated PVC.    Dizziness corresponded to sinus rhythm and a mild sinus tachycardia.  It is the reader's impression that the symptoms are out of proportion to the degree of ectopy.    No sustained atrial or ventricular tachyarrhythmia.    No profound bradycardia or pauses.        Comment: The recording was for 23 hrs and 59 min.  The recording quality was adequate.      Exam Information    Exam Date Exam Time Exam Date Exam Time Accession # Performing Department  Results    2/1/21 12:00 AM 2/1/21 12:01 PM [id number] [facility] [place]        [id number]     PACS Images     Show images for CTA Angiogram coronary artery   Study Result    Narrative & Impression     The total Agatston calcium score is 507. A calcium score in this range places the individual in the 100th percentile when compared to an age and gender matched control group and implies a very high risk of cardiac events in the next ten years.    Right dominant coronary artery system.    Mild nonobstructive coronary artery disease in the left main, left anterior descending artery and circumflex artery.               Physical Examination  Review of Systems   Vitals: There were no vitals taken for this visit.  BMI= There is no height or weight on file to calculate BMI.  Wt Readings from Last 3 Encounters:   07/09/21 68.8 kg (151 lb 11.2 oz)   06/16/21 69.4 kg (152 lb 14.4 oz)   06/07/21 68.9 kg (152 lb)       General Appearance:   no distress, normal body habitus   ENT/Mouth: membranes moist, no oral lesions or bleeding gums.      EYES:  no scleral icterus, normal conjunctivae   Neck: no carotid bruits or thyromegaly   Chest/Lungs:    Decreased breath sounds bilaterally without rales detected., equal chest wall expansion    Cardiovascular:    Distant, regular. Normal first and second heart sounds with no definite murmurs, rubs, or gallops; the carotid, radial and posterior tibial pulses are intact, Jugular venous pressure is within normal limits, no edema bilaterally    Abdomen:  no organomegaly, masses, bruits, or tenderness; bowel sounds are present   Extremities: no cyanosis or clubbing   Skin: no xanthelasma, warm.    Neurologic: normal  bilateral, no tremors     Psychiatric: alert and oriented x3, calm        Please refer above for cardiac ROS details.        Medical History  Surgical History Family History Social History   Past Medical History:   Diagnosis Date     COPD  (chronic obstructive pulmonary disease) (H)      Coronary artery disease due to lipid rich plaque 02/2021    Abnormal coronary CTA     Family history of myocardial infarction     mother under age 60's  CVA and MI     High cholesterol      Hypertension      PVC's (premature ventricular contractions)      Syncope 2012     Tubular adenoma of colon      Past Surgical History:   Procedure Laterality Date     APPENDECTOMY       SHOULDER SURGERY Right      TUBAL LIGATION       Family History   Problem Relation Age of Onset     Lung Cancer Father         lung     Breast Cancer Paternal Aunt      Cerebrovascular Disease Mother      Coronary Artery Disease Mother         Social History     Socioeconomic History     Marital status:      Spouse name: Not on file     Number of children: Not on file     Years of education: Not on file     Highest education level: Not on file   Occupational History     Not on file   Tobacco Use     Smoking status: Current Every Day Smoker   Substance and Sexual Activity     Alcohol use: Not on file     Drug use: Not on file     Sexual activity: Not on file   Other Topics Concern     Parent/sibling w/ CABG, MI or angioplasty before 65F 55M? Not Asked   Social History Narrative     Not on file     Social Determinants of Health     Financial Resource Strain:      Difficulty of Paying Living Expenses:    Food Insecurity:      Worried About Running Out of Food in the Last Year:      Ran Out of Food in the Last Year:    Transportation Needs:      Lack of Transportation (Medical):      Lack of Transportation (Non-Medical):    Physical Activity:      Days of Exercise per Week:      Minutes of Exercise per Session:    Stress:      Feeling of Stress :    Social Connections:      Frequency of Communication with Friends and Family:      Frequency of Social Gatherings with Friends and Family:      Attends [employer] Services:      Active Member of Clubs or Organizations:      Attends Club or  Organization Meetings:      Marital Status:    Intimate Partner Violence:      Fear of Current or Ex-Partner:      Emotionally Abused:      Physically Abused:      Sexually Abused:            Medications  Allergies   Current Outpatient Medications   Medication Sig Dispense Refill     acetaminophen (TYLENOL) 500 MG tablet Take 500-1,000 mg by mouth every 8 hours as needed       albuterol (PROAIR HFA/PROVENTIL HFA/VENTOLIN HFA) 108 (90 Base) MCG/ACT inhaler Inhale 2 puffs into the lungs       aspirin (ASA) 81 MG EC tablet Take 81 mg by mouth daily       atorvastatin (LIPITOR) 40 MG tablet Take 40 mg by mouth At Bedtime       Cholecalciferol 250 MCG (71324 UT) TABS Take 10,000 Units by mouth daily       fluticasone (FLONASE) 50 MCG/ACT nasal spray Spray 2 sprays in nostril daily       IBANdronate (BONIVA) 150 MG tablet Take 150 mg by mouth every 30 days       lisinopril (ZESTRIL) 5 MG tablet Take 5 mg by mouth daily       Multiple Vitamin (MULTIVITAMIN ADULT PO) Take 1 tablet by mouth       nicotine (NICORETTE) 2 MG gum Take 2 mg by mouth       nitroGLYcerin (NITRODUR) 0.1 MG/HR 24 hr patch Place 1 patch onto the skin daily       Omega-3 1000 MG capsule Take 1 g by mouth daily       tiotropium (SPIRIVA) 18 MCG inhaled capsule Inhale 18 mcg into the lungs daily       No Known Allergies       Lab Results    Chemistry/lipid CBC Cardiac Enzymes/BNP/TSH/INR   Recent Labs   Lab Test 03/19/21  1309   CHOL 160   HDL 74   LDL 70   TRIG 82     Recent Labs   Lab Test 03/19/21  1309 10/09/20  1415 04/06/20  1414   LDL 70 85 69     Recent Labs   Lab Test 06/07/21  1309      POTASSIUM 4.4   CHLORIDE 106   CO2 27      BUN 13   CR 0.72   GFRESTIMATED >60   TRACE 10.1     Recent Labs   Lab Test 06/07/21  1309 06/04/21  1004 02/01/21  0522   CR 0.72 0.7 0.57*     No results for input(s): A1C in the last 08383 hours.       Recent Labs   Lab Test 06/07/21  1309   WBC 5.9   HGB 13.3   HCT 40.7   MCV 99        Recent Labs    Lab Test 06/07/21  1309 01/29/21  1307 04/06/20  1414   HGB 13.3 14.1 13.6    Recent Labs   Lab Test 01/30/21  0602 01/30/21  0023 01/29/21  1845   TROPONINI <0.01 <0.01 0.01     No results for input(s): BNP, NTBNPI, NTBNP in the last 88062 hours.  Recent Labs   Lab Test 10/07/19  1602   TSH 1.55     Recent Labs   Lab Test 01/29/21  1307   INR 1.00        Shivam Denise MD

## 2021-08-20 ENCOUNTER — TELEPHONE (OUTPATIENT)
Dept: ONCOLOGY | Facility: CLINIC | Age: 76
End: 2021-08-20

## 2021-08-20 NOTE — TELEPHONE ENCOUNTER
Patient called and left message stating she finished radiation month and wondering if she should try and get 3rd Covid vaccine.  She asked for call back to [phone].    Explained to patient that we are waiting further direction re: 3rd Covid vaccine per email below from 8/18.  Patient verbalized understanding and will continue to watch closely for updates.  ANTONIO Cheng RN     From 8/18/21 employee message: COVID-19  Vaccine third dose eight months after second  Many individuals read in the news yesterday that most Americans should get a third COVID-19 vaccination shot eight months after they received their second shot.    At this time, we are not yet providing third shots of COVID-19 vaccine and are asking physicians and APPs to refrain from placing orders for them. We await official notice from the CDC and detailed guidance from the Advisory Committee on Immunization Practices (ACIP). Our system will then begin discussion and coordination with the [place] Department of Health and other large health systems in [place] to plan implementation.     Just as we are currently planning for another shot for immunocompromised individuals, our system will need more information about who qualifies for the third shot and what type of vaccine these patients should receive in order to plan, operationalize, and implement our program. A cross-functional team is in place and will work to move this forward.    Once our system has official clearance and answers to these important questions, we will share next steps. Thank you for your patience.

## 2021-10-07 ENCOUNTER — HOSPITAL ENCOUNTER (OUTPATIENT)
Dept: CT IMAGING | Facility: CLINIC | Age: 76
Discharge: HOME | End: 2021-10-07
Attending: INTERNAL MEDICINE | Admitting: INTERNAL MEDICINE
Payer: MEDICARE

## 2021-10-07 DIAGNOSIS — R91.8 MASS OF UPPER LOBE OF RIGHT LUNG: ICD-10-CM

## 2021-10-07 LAB
CREAT BLD-MCNC: 0.7 MG/DL (ref 0.6–1.1)
GFR SERPL CREATININE-BSD FRML MDRD: >60 ML/MIN/1.73M2

## 2021-10-07 PROCEDURE — 250N000011 HC RX IP 250 OP 636: Performed by: INTERNAL MEDICINE

## 2021-10-07 PROCEDURE — 82565 ASSAY OF CREATININE: CPT

## 2021-10-07 PROCEDURE — 71260 CT THORAX DX C+: CPT | Mod: MG

## 2021-10-07 RX ORDER — IOPAMIDOL 755 MG/ML
100 INJECTION, SOLUTION INTRAVASCULAR ONCE
Status: COMPLETED | OUTPATIENT
Start: 2021-10-07 | End: 2021-10-07

## 2021-10-07 RX ADMIN — IOPAMIDOL 100 ML: 755 INJECTION, SOLUTION INTRAVENOUS at 15:23

## 2021-10-11 ENCOUNTER — ONCOLOGY VISIT (OUTPATIENT)
Dept: ONCOLOGY | Facility: CLINIC | Age: 76
End: 2021-10-11
Attending: INTERNAL MEDICINE
Payer: MEDICARE

## 2021-10-11 VITALS
RESPIRATION RATE: 16 BRPM | OXYGEN SATURATION: 95 % | DIASTOLIC BLOOD PRESSURE: 80 MMHG | BODY MASS INDEX: 25.83 KG/M2 | HEART RATE: 92 BPM | TEMPERATURE: 98.4 F | SYSTOLIC BLOOD PRESSURE: 129 MMHG | WEIGHT: 155.2 LBS

## 2021-10-11 DIAGNOSIS — G89.29 CHRONIC MIDLINE LOW BACK PAIN WITHOUT SCIATICA: ICD-10-CM

## 2021-10-11 DIAGNOSIS — C34.11 MALIGNANT NEOPLASM OF UPPER LOBE OF RIGHT LUNG (H): Primary | ICD-10-CM

## 2021-10-11 DIAGNOSIS — M54.50 CHRONIC MIDLINE LOW BACK PAIN WITHOUT SCIATICA: ICD-10-CM

## 2021-10-11 PROCEDURE — G0463 HOSPITAL OUTPT CLINIC VISIT: HCPCS

## 2021-10-11 PROCEDURE — 99214 OFFICE O/P EST MOD 30 MIN: CPT | Performed by: INTERNAL MEDICINE

## 2021-10-11 RX ORDER — PREDNISONE 10 MG/1
TABLET ORAL
COMMUNITY
Start: 2021-02-01 | End: 2022-02-04

## 2021-10-11 RX ORDER — LISINOPRIL 10 MG/1
10 TABLET ORAL DAILY
COMMUNITY
Start: 2021-01-04 | End: 2022-02-04

## 2021-10-11 ASSESSMENT — PAIN SCALES - GENERAL: PAINLEVEL: NO PAIN (0)

## 2021-10-11 NOTE — LETTER
"    10/11/2021         RE: Shantel Romero  [address]  [place] [address]        Dear Colleague,    Thank you for referring your patient, Shantel Romero, to the [facility]. Please see a copy of my visit note below.    Oncology Rooming Note    October 11, 2021 2:28 PM   Shantel Romero is a 76 year old female who presents for:    Chief Complaint   Patient presents with     Oncology Clinic Visit     Mass of upper lobe of right lung     Initial Vitals: /80   Pulse 92   Temp 98.4  F (36.9  C)   Resp 16   Wt 70.4 kg (155 lb 3.2 oz)   SpO2 95%   BMI 25.83 kg/m   Estimated body mass index is 25.83 kg/m  as calculated from the following:    Height as of 3/12/21: 1.651 m (5' 5\").    Weight as of this encounter: 70.4 kg (155 lb 3.2 oz). Body surface area is 1.8 meters squared.  No Pain (0) Comment: Data Unavailable   No LMP recorded.  Allergies reviewed: Yes  Medications reviewed: Yes    Medications: Medication refills not needed today.  Pharmacy name entered into [employer]: [id number] - [place] [id number] AT [facility] [address]    Clinical concerns: None    Samaria Kelly LPN              [facility] Hematology and Oncology Progress Note    Patient: Shantel Romero  MRN: [id number]  Date of Service: Oct 11, 2021        Assessment and Plan:    Cancer Staging  No matching staging information was found for the patient.    1.  Right-sided lung mass: Most likely lung cancer.  She was treated with stereotactic radiosurgery which was completed 3 months ago.  This was tolerated well.  She had a CT scan of the chest 4 days ago.  I personally reviewed the images.  These were shared with the patient.  The right-sided nodule is about the same size.  A few millimeters smaller when compared to June 4.  Reviewed with Radha many times after radiosurgery of the lung the tumor may not shrink secondary to the presence of scar " tissue which should remain stable.  At this point we are going to repeat a CAT scan in 4 months.  Questions were answered.    2.  Low back pain: Chronic, intermittent.  She has had steroid injections in the past.  I referred her to the spine clinic.    [employer]  1    Diagnosis:    1.  Right upper lobe lung mass: Initially seen in 2019.  Has not been biopsied.    Treatment:    Stereotactic radiosurgery: 5400 cGy given in 3 fractions.  Completed July 9, 2021.    Interim History:    Jan returns today for follow-up visit.  She finished radiosurgery 3 months ago.  She is feeling okay today.  She has back pain.  This is chronic.  No chest pain or shortness of breath.    Review of Systems:    As above in the history.     Review of Systems otherwise Negative for:  General: chills, fever or night sweats  Psychological: anxiety or depression  Ophthalmic: blurry vision, double vision or loss of vision, vision change  ENT: epistaxis, oral lesions, hearing changes  Hematological and Lymphatic: bleeding, bruising, jaundice, swollen lymph nodes  Endocrine: hot flashes, unexpected weight changes  Respiratory: cough, hemoptysis, orthopnea or shortness of breath/LUKE  Cardiovascular: chest pain, edema, palpitations or PND  Gastrointestinal: abdominal pain, blood in stools, change in bowel habits, constipation, diarrhea or nausea/vomiting  Genito-Urinary: change in urinary stream, incontinence, frequency/urgency  Musculoskeletal: joint stiffness, swelling, muscle pain  Neurological: dizziness, headaches, numbness/tingling  Dermatological: lumps and rash    Past History:    Past Medical History:   Diagnosis Date     COPD (chronic obstructive pulmonary disease) (H)      Coronary artery disease due to lipid rich plaque 02/2021    Abnormal coronary CTA     Family history of myocardial infarction     mother under age 60's  CVA and MI     High cholesterol      Hypertension      PVC's (premature ventricular contractions)      Syncope  2012     Tubular adenoma of colon      Physical Exam:    /80   Pulse 92   Temp 98.4  F (36.9  C)   Resp 16   Wt 70.4 kg (155 lb 3.2 oz)   SpO2 95%   BMI 25.83 kg/m      General: patient appears stated age of 76 year old. Nontoxic and in no distress.   HEENT: Head: atraumatic, normocephalic. Sclerae anicteric.  Chest:  Normal respiratory effort  Cardiac:  No edema.   Abdomen: abdomen is non-distended  Extremities: normal tone and muscle bulk.  Skin: no lesions or rash on visible skin. Warm and dry.   CNS: alert and oriented. Grossly non-focal.   Psychiatric: normal mood and affect.     Lab Results:    Recent Results (from the past 168 hour(s))   Creatinine POCT   Result Value Ref Range    Creatinine POCT 0.7 0.6 - 1.1 mg/dL    GFR, ESTIMATED POCT >60 >60 mL/min/1.73m2     Imaging:    CT Chest w Contrast    Result Date: 10/7/2021  EXAM: CT CHEST W CONTRAST LOCATION: [facility] DATE/TIME: 10/7/2021 2:36 PM INDICATION: Follow-up right upper lobe nodule. COMPARISON: 6/4/2021. TECHNIQUE: CT chest with IV contrast. Multiplanar reformats were obtained. Dose reduction techniques were used. CONTRAST: 100 mL Isovue-370. FINDINGS: LUNGS AND PLEURA: There is a 13 x 9 mm right upper lobe pulmonary nodule (series 3 image 100). This compares with 16 x 9 mm when measured in a similar fashion on 6/4/2021. Advanced emphysematous changes of the lungs are stable. MEDIASTINUM/AXILLAE: Heart size is normal. The thoracic aorta is normal in caliber. There is no lymphadenopathy. CORONARY ARTERY CALCIFICATION: Moderate. UPPER ABDOMEN: There is a 13 mm focus of enhancement involving the anterior aspect of the liver on image 131 of series 2. This is unchanged and may reflect a cavernous hemangioma or area of altered perfusion. Benign-appearing bilateral renal cysts. MUSCULOSKELETAL: No suspicious osseous lesions.     IMPRESSION: 1.  13 x 9 mm spiculated right upper lobe pulmonary nodule. Though this remains  suspicious for adenocarcinoma, there has been no increase in size when measured in a similar fashion. 2.  Advanced pulmonary emphysema, stable.       Signed by: Timoteo Barnhart MD        Again, thank you for allowing me to participate in the care of your patient.        Sincerely,        Timoteo Barnhart MD

## 2021-10-11 NOTE — PROGRESS NOTES
[facility] Hematology and Oncology Progress Note    Patient: Shantel Romero  MRN: [id number]  Date of Service: Oct 11, 2021        Assessment and Plan:    Cancer Staging  No matching staging information was found for the patient.    1.  Right-sided lung mass: Most likely lung cancer.  She was treated with stereotactic radiosurgery which was completed 3 months ago.  This was tolerated well.  She had a CT scan of the chest 4 days ago.  I personally reviewed the images.  These were shared with the patient.  The right-sided nodule is about the same size.  A few millimeters smaller when compared to June 4.  Reviewed with Radha many times after radiosurgery of the lung the tumor may not shrink secondary to the presence of scar tissue which should remain stable.  At this point we are going to repeat a CAT scan in 4 months.  Questions were answered.    2.  Low back pain: Chronic, intermittent.  She has had steroid injections in the past.  I referred her to the spine clinic.    [facility]  1    Diagnosis:    1.  Right upper lobe lung mass: Initially seen in 2019.  Has not been biopsied.    Treatment:    Stereotactic radiosurgery: 5400 cGy given in 3 fractions.  Completed July 9, 2021.    Interim History:    Cosme returns today for follow-up visit.  She finished radiosurgery 3 months ago.  She is feeling okay today.  She has back pain.  This is chronic.  No chest pain or shortness of breath.    Review of Systems:    As above in the history.     Review of Systems otherwise Negative for:  General: chills, fever or night sweats  Psychological: anxiety or depression  Ophthalmic: blurry vision, double vision or loss of vision, vision change  ENT: epistaxis, oral lesions, hearing changes  Hematological and Lymphatic: bleeding, bruising, jaundice, swollen lymph nodes  Endocrine: hot flashes, unexpected weight changes  Respiratory: cough, hemoptysis, orthopnea or shortness of breath/LUKE  Cardiovascular: chest pain,  edema, palpitations or PND  Gastrointestinal: abdominal pain, blood in stools, change in bowel habits, constipation, diarrhea or nausea/vomiting  Genito-Urinary: change in urinary stream, incontinence, frequency/urgency  Musculoskeletal: joint stiffness, swelling, muscle pain  Neurological: dizziness, headaches, numbness/tingling  Dermatological: lumps and rash    Past History:    Past Medical History:   Diagnosis Date     COPD (chronic obstructive pulmonary disease) (H)      Coronary artery disease due to lipid rich plaque 02/2021    Abnormal coronary CTA     Family history of myocardial infarction     mother under age 60's  CVA and MI     High cholesterol      Hypertension      PVC's (premature ventricular contractions)      Syncope 2012     Tubular adenoma of colon      Physical Exam:    /80   Pulse 92   Temp 98.4  F (36.9  C)   Resp 16   Wt 70.4 kg (155 lb 3.2 oz)   SpO2 95%   BMI 25.83 kg/m      General: patient appears stated age of 76 year old. Nontoxic and in no distress.   HEENT: Head: atraumatic, normocephalic. Sclerae anicteric.  Chest:  Normal respiratory effort  Cardiac:  No edema.   Abdomen: abdomen is non-distended  Extremities: normal tone and muscle bulk.  Skin: no lesions or rash on visible skin. Warm and dry.   CNS: alert and oriented. Grossly non-focal.   Psychiatric: normal mood and affect.     Lab Results:    Recent Results (from the past 168 hour(s))   Creatinine POCT   Result Value Ref Range    Creatinine POCT 0.7 0.6 - 1.1 mg/dL    GFR, ESTIMATED POCT >60 >60 mL/min/1.73m2     Imaging:    CT Chest w Contrast    Result Date: 10/7/2021  EXAM: CT CHEST W CONTRAST LOCATION: [facility] DATE/TIME: 10/7/2021 2:36 PM INDICATION: Follow-up right upper lobe nodule. COMPARISON: 6/4/2021. TECHNIQUE: CT chest with IV contrast. Multiplanar reformats were obtained. Dose reduction techniques were used. CONTRAST: 100 mL Isovue-370. FINDINGS: LUNGS AND PLEURA: There is a  13 x 9 mm right upper lobe pulmonary nodule (series 3 image 100). This compares with 16 x 9 mm when measured in a similar fashion on 6/4/2021. Advanced emphysematous changes of the lungs are stable. MEDIASTINUM/AXILLAE: Heart size is normal. The thoracic aorta is normal in caliber. There is no lymphadenopathy. CORONARY ARTERY CALCIFICATION: Moderate. UPPER ABDOMEN: There is a 13 mm focus of enhancement involving the anterior aspect of the liver on image 131 of series 2. This is unchanged and may reflect a cavernous hemangioma or area of altered perfusion. Benign-appearing bilateral renal cysts. MUSCULOSKELETAL: No suspicious osseous lesions.     IMPRESSION: 1.  13 x 9 mm spiculated right upper lobe pulmonary nodule. Though this remains suspicious for adenocarcinoma, there has been no increase in size when measured in a similar fashion. 2.  Advanced pulmonary emphysema, stable.       Signed by: Timoteo Barnhart MD

## 2021-10-11 NOTE — PROGRESS NOTES
"Oncology Rooming Note    October 11, 2021 2:28 PM   Shantel Romero is a 76 year old female who presents for:    Chief Complaint   Patient presents with     Oncology Clinic Visit     Mass of upper lobe of right lung     Initial Vitals: /80   Pulse 92   Temp 98.4  F (36.9  C)   Resp 16   Wt 70.4 kg (155 lb 3.2 oz)   SpO2 95%   BMI 25.83 kg/m   Estimated body mass index is 25.83 kg/m  as calculated from the following:    Height as of 3/12/21: 1.651 m (5' 5\").    Weight as of this encounter: 70.4 kg (155 lb 3.2 oz). Body surface area is 1.8 meters squared.  No Pain (0) Comment: Data Unavailable   No LMP recorded.  Allergies reviewed: Yes  Medications reviewed: Yes    Medications: Medication refills not needed today.  Pharmacy name entered into [facility]: [id number] - [place] [id number] AT [facility] [address]    Clinical concerns: None    Samaria Kelly LPN            "

## 2021-10-14 NOTE — PROGRESS NOTES
ASSESSMENT: Shantel Romero is a 76 year old female with past medical history significant for lung cancer (status post radiation), coronary artery disease, hypertension, osteopenia who presents today for new patient evaluation of chronic and worsening right greater than left low back pain with radiation into the bilateral lower extremities.  Patient has not had any recent imaging of the lumbar spine. My review of an MRI lumbar spine from 2017 showed multilevel lumbar disc degeneration and facet arthropathy with mild central canal stenosis L2-3.  It also showed mild right foraminal stenosis L4-5 and mild left foraminal stenosis L5-S1. On exam today patient's pain seems most consistent with sacroiliac joint dysfunction. She had reproduction of pain with SI joint provocative maneuvers x4 on the right and x3 on the left. Within the differential is also lumbar radiculitis.    PLAN:  A shared decision making model was used.  The patient's values and choices were respected.  The following represents what was discussed and decided upon by the physician assistant and the patient.      1.  DIAGNOSTIC TESTS:  -I reviewed the MRI lumbar spine images from 2017.  -I ordered an updated MRI lumbar spine for further evaluation.  Patient does have lung cancer.  -I also reviewed the PET scan from February 2021.    2.  PHYSICAL THERAPY:  Discussed the importance of core strengthening, ROM, stretching exercises with the patient and how each of these entities is important in decreasing pain.  Explained to the patient that the purpose of physical therapy is to teach the patient a home exercise program.  These exercises need to be performed every day in order to decrease pain and prevent future occurrences of pain.  I entered a referral to physical therapy.    3.  MEDICATIONS: No changes are made to the patient's medications. She is not taking any pain relief medications. She states that medications including Tylenol, ibuprofen,  prednisone have not been helpful for her back pain in the past.  -I did provide a prescription for an SI belt. She can wear this as needed.    4.  INTERVENTIONS:   -In the interest of keeping things moving in a timely manner, I offered the patient bilateral sacroiliac joint injections.  She is quite desperate for some relief of pain. Patient indicated she would like to proceed and an order was placed. I did tell the patient that she needs to get her MRI lumbar spine first to make sure there is not additional pathology which could be causing her pain. She voiced understanding to this. She will schedule the sacroiliac joint injections at least 2 weeks out to allow time for the MRI lumbar spine. If for some reason the MRI lumbar spine is not completed before her injection we will need to reschedule her injection.    5.  PATIENT EDUCATION: Patient is in agreement with the above plan.  All questions were answered.    6.  FOLLOW-UP:   Patient should follow up with me 2 weeks after her bilateral sacroiliac joint injections. A nurse will call the patient with the results of her MRI lumbar spine in the meantime. If she has questions or concerns, she should not hesitate to call.      SUBJECTIVE:  Shantel Romero  Is a 76 year old female who presents today in consultation at request of Dr. Barnhart for new patient evaluation of low back pain with radiation into the bilateral lower extremities. Patient reports that she has had chronic low back pain for many years. Pain has been getting gradually worse. She states that the pain comes and goes. When she feels good she tries to do more activity but then her pain becomes much more severe and it takes her time to recover. She feels that overall her pain is starting to limit her activity more and more.    Patient complains of right greater than left low back pain. Pain is located the lumbosacral junction. Pain radiates into the buttocks, ultra lateral hips, and down the anterior  thighs to the knees. She states that in the past she has had a burning type of pain on the bilateral lateral hips which is not present currently. She feels generally weak in both of her legs. She has abnormal sensation in both feet. She states the feet feel swollen and uncomfortable on both the plantar and dorsal aspects. Pain is aggravated vacuuming, loading dishes, bending, stripping beds. Patient states that walking also makes her pain worse but walking is limited more by shortness of breath and pain so she is not sure how far she could go before the pain would stop her. Pain is alleviated with rest. She states that sitting on a barstool is more comfortable than sitting in a regular chair. Patient denies any loss of bowel or bladder control. Denies any recent fevers, chills, sweats.    Patient did physical therapy many years ago. This was not helpful. She stopped doing the home exercises because they did not help. She went to the chiropractor years ago which provided relief only for a few hours. Patient had 2 injections for back pain in the past. It sounds like these were probably trigger point injections because they were done at the bedside. They were helpful for her pain. She has not had any spine surgery. She is not taking any pain relieving medications. She states that no medication has ever been helpful for her back pain including prednisone, ibuprofen, Tylenol.    Current Outpatient Medications   Medication     acetaminophen (TYLENOL) 500 MG tablet     albuterol (PROAIR HFA/PROVENTIL HFA/VENTOLIN HFA) 108 (90 Base) MCG/ACT inhaler     aspirin (ASA) 81 MG EC tablet     atorvastatin (LIPITOR) 40 MG tablet     Cholecalciferol 250 MCG (72745 UT) TABS     fluticasone (FLONASE) 50 MCG/ACT nasal spray     IBANdronate (BONIVA) 150 MG tablet     ibuprofen (ADVIL/MOTRIN) 800 MG tablet     lisinopril (ZESTRIL) 10 MG tablet     lisinopril (ZESTRIL) 5 MG tablet     Multiple Vitamin (MULTIVITAMIN ADULT PO)      multivitamin w/minerals (MULTI-VITAMIN) tablet     nicotine (NICORETTE) 2 MG gum     nitroGLYcerin (NITRODUR) 0.1 MG/HR 24 hr patch     Omega-3 1000 MG capsule     predniSONE (DELTASONE) 10 MG tablet     tiotropium (SPIRIVA) 18 MCG inhaled capsule     UNABLE TO FIND       No Known Allergies    Past Medical History:   Diagnosis Date     COPD (chronic obstructive pulmonary disease) (H)      Coronary artery disease due to lipid rich plaque 02/2021    Abnormal coronary CTA     Family history of myocardial infarction     mother under age 60's  CVA and MI     High cholesterol      Hypertension      PVC's (premature ventricular contractions)      Syncope 2012     Tubular adenoma of colon         Patient Active Problem List   Diagnosis     Premature Menopause     Osteopenia     Chest pain     Dyspnea on exertion     Coronary artery disease due to lipid rich plaque     Hypertension, unspecified type     Tobacco abuse     Mass of upper lobe of right lung     Malignant neoplasm of upper lobe of right lung (H)       Past Surgical History:   Procedure Laterality Date     APPENDECTOMY       SHOULDER SURGERY Right      TUBAL LIGATION         Family History   Problem Relation Age of Onset     Lung Cancer Father         lung     Breast Cancer Paternal Aunt      Cerebrovascular Disease Mother      Coronary Artery Disease Mother        Social history: Patient is . She is retired. She quit smoking in January 2021. She denies alcohol use. Denies illicit drug use.      ROS: Positive for palpitations, feet/leg swelling, shortness of breath, joint pain, itching, dizziness, easy bruising, insomnia, excessive tiredness, anxiety. Specifically negative for bowel/bladder dysfunction, fevers,chills, appetite changes, unexplained weight loss.   Otherwise 13 systems reviewed are negative.  Please see the patient's intake questionnaire from today for details.      OBJECTIVE:  PHYSICAL EXAMINATION:    CONSTITUTIONAL:  Vital signs as above.   No acute distress.  The patient is well nourished and well groomed.  PSYCHIATRIC:  The patient is awake, alert, oriented to person, place, time and answering questions appropriately with clear speech.    HEENT: Normocephalic, atraumatic.  Sclera clear.  Neck is supple.  SKIN:  Skin over the face, bilateral lower extremities, and posterior torso is clean, dry, intact without rashes.    GAIT:  Gait is non-antalgic.  The patient is able to rise onto toes and heels bilaterally with support.  STANDING EXAMINATION: Mild tenderness palpation bilateral lower lumbar paraspinous muscles L5-S1. Significant tenderness to palpation bilateral sacroiliac joints. Positive Elizabeth finger test bilaterally. No significant tenderness to palpation bilateral greater trochanters. Lumbar flexion intact. Lumbar extension mildly restricted with increased pain.  MUSCLE STRENGTH:  The patient has 5/5 strength for the bilateral hip flexors, knee flexors/extensors, ankle dorsiflexors/plantar flexors, great toe extensors, ankle evertors/invertors.  NEUROLOGICAL:  2/4 patellar, and achilles reflexes bilaterally. Negative Babinski's bilaterally.  No ankle clonus bilaterally. Sensation to light touch is intact in the bilateral L4, L5, and S1 dermatomes.  VASCULAR:  2/4 dorsalis pedis pulses bilaterally.  Bilateral lower extremities are warm.  There is no pitting edema of the bilateral lower extremities.  ABDOMINAL:  Soft, non-distended, non-tender throughout all quadrants.  No pulsatile mass palpated in the left lower quadrant.  LYMPH NODES:  No palpable or tender inguinal lymph nodes.  MUSCULOSKELETAL: Negative pelvic distraction test. Positive thigh thrust right, negative thigh thrust left. Positive Jay's test bilaterally reproducing pain localizing to the sacroiliac joints. Positive Gaenslen's test right, negative Gaenslen's test left. Positive Yeomans test bilaterally.    RESULTS: I reviewed the PET scan from [facility] dated February 4,  2021.  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    I reviewed an MRI lumbar spine from [facility] dated October 10, 2017.  This shows multilevel lumbar spondylosis.  At L2-3 there is mild central canal stenosis.  At L4-5 there is mild right foraminal stenosis.  At L5-S1 there is mild left foraminal stenosis.  Please here for further details.

## 2021-10-15 ENCOUNTER — OFFICE VISIT (OUTPATIENT)
Dept: PHYSICAL MEDICINE AND REHAB | Facility: CLINIC | Age: 76
End: 2021-10-15
Attending: INTERNAL MEDICINE
Payer: MEDICARE

## 2021-10-15 VITALS
HEART RATE: 83 BPM | HEIGHT: 65 IN | BODY MASS INDEX: 25.92 KG/M2 | SYSTOLIC BLOOD PRESSURE: 110 MMHG | WEIGHT: 155.6 LBS | DIASTOLIC BLOOD PRESSURE: 56 MMHG

## 2021-10-15 DIAGNOSIS — M54.41 CHRONIC BILATERAL LOW BACK PAIN WITH BILATERAL SCIATICA: ICD-10-CM

## 2021-10-15 DIAGNOSIS — M54.42 CHRONIC BILATERAL LOW BACK PAIN WITH BILATERAL SCIATICA: ICD-10-CM

## 2021-10-15 DIAGNOSIS — M53.3 SACROILIAC JOINT PAIN: Primary | ICD-10-CM

## 2021-10-15 DIAGNOSIS — G89.29 CHRONIC BILATERAL LOW BACK PAIN WITH BILATERAL SCIATICA: ICD-10-CM

## 2021-10-15 DIAGNOSIS — C34.11 MALIGNANT NEOPLASM OF UPPER LOBE OF RIGHT LUNG (H): ICD-10-CM

## 2021-10-15 PROCEDURE — 99204 OFFICE O/P NEW MOD 45 MIN: CPT | Performed by: PHYSICIAN ASSISTANT

## 2021-10-15 ASSESSMENT — MIFFLIN-ST. JEOR: SCORE: 1191.68

## 2021-10-15 ASSESSMENT — PAIN SCALES - GENERAL: PAINLEVEL: MILD PAIN (3)

## 2021-10-15 NOTE — LETTER
10/15/2021         RE: Shantel Romero  [address]  [place] [address]        Dear Colleague,    Thank you for referring your patient, Shantel Romero, to the [facility] [place]. Please see a copy of my visit note below.    ASSESSMENT: Shantel Romero is a 76 year old female with past medical history significant for lung cancer (status post radiation), coronary artery disease, hypertension, osteopenia who presents today for new patient evaluation of chronic and worsening right greater than left low back pain with radiation into the bilateral lower extremities.  Patient has not had any recent imaging of the lumbar spine. My review of an MRI lumbar spine from 2017 showed multilevel lumbar disc degeneration and facet arthropathy with mild central canal stenosis L2-3.  It also showed mild right foraminal stenosis L4-5 and mild left foraminal stenosis L5-S1. On exam today patient's pain seems most consistent with sacroiliac joint dysfunction. She had reproduction of pain with SI joint provocative maneuvers x4 on the right and x3 on the left. Within the differential is also lumbar radiculitis.    PLAN:  A shared decision making model was used.  The patient's values and choices were respected.  The following represents what was discussed and decided upon by the physician assistant and the patient.      1.  DIAGNOSTIC TESTS:  -I reviewed the MRI lumbar spine images from 2017.  -I ordered an updated MRI lumbar spine for further evaluation.  Patient does have lung cancer.  -I also reviewed the PET scan from February 2021.    2.  PHYSICAL THERAPY:  Discussed the importance of core strengthening, ROM, stretching exercises with the patient and how each of these entities is important in decreasing pain.  Explained to the patient that the purpose of physical therapy is to teach the patient a home exercise program.  These exercises need to be performed every day in order to decrease pain  and prevent future occurrences of pain.  I entered a referral to physical therapy.    3.  MEDICATIONS: No changes are made to the patient's medications. She is not taking any pain relief medications. She states that medications including Tylenol, ibuprofen, prednisone have not been helpful for her back pain in the past.  -I did provide a prescription for an SI belt. She can wear this as needed.    4.  INTERVENTIONS:   -In the interest of keeping things moving in a timely manner, I offered the patient bilateral sacroiliac joint injections.  She is quite desperate for some relief of pain. Patient indicated she would like to proceed and an order was placed. I did tell the patient that she needs to get her MRI lumbar spine first to make sure there is not additional pathology which could be causing her pain. She voiced understanding to this. She will schedule the sacroiliac joint injections at least 2 weeks out to allow time for the MRI lumbar spine. If for some reason the MRI lumbar spine is not completed before her injection we will need to reschedule her injection.    5.  PATIENT EDUCATION: Patient is in agreement with the above plan.  All questions were answered.    6.  FOLLOW-UP:   Patient should follow up with me 2 weeks after her bilateral sacroiliac joint injections. A nurse will call the patient with the results of her MRI lumbar spine in the meantime. If she has questions or concerns, she should not hesitate to call.      SUBJECTIVE:  Shantel Romero  Is a 76 year old female who presents today in consultation at request of Dr. Barnhart for new patient evaluation of low back pain with radiation into the bilateral lower extremities. Patient reports that she has had chronic low back pain for many years. Pain has been getting gradually worse. She states that the pain comes and goes. When she feels good she tries to do more activity but then her pain becomes much more severe and it takes her time to recover. She  feels that overall her pain is starting to limit her activity more and more.    Patient complains of right greater than left low back pain. Pain is located the lumbosacral junction. Pain radiates into the buttocks, ultra lateral hips, and down the anterior thighs to the knees. She states that in the past she has had a burning type of pain on the bilateral lateral hips which is not present currently. She feels generally weak in both of her legs. She has abnormal sensation in both feet. She states the feet feel swollen and uncomfortable on both the plantar and dorsal aspects. Pain is aggravated vacuuming, loading dishes, bending, stripping beds. Patient states that walking also makes her pain worse but walking is limited more by shortness of breath and pain so she is not sure how far she could go before the pain would stop her. Pain is alleviated with rest. She states that sitting on a barstool is more comfortable than sitting in a regular chair. Patient denies any loss of bowel or bladder control. Denies any recent fevers, chills, sweats.    Patient did physical therapy many years ago. This was not helpful. She stopped doing the home exercises because they did not help. She went to the chiropractor years ago which provided relief only for a few hours. Patient had 2 injections for back pain in the past. It sounds like these were probably trigger point injections because they were done at the bedside. They were helpful for her pain. She has not had any spine surgery. She is not taking any pain relieving medications. She states that no medication has ever been helpful for her back pain including prednisone, ibuprofen, Tylenol.    Current Outpatient Medications   Medication     acetaminophen (TYLENOL) 500 MG tablet     albuterol (PROAIR HFA/PROVENTIL HFA/VENTOLIN HFA) 108 (90 Base) MCG/ACT inhaler     aspirin (ASA) 81 MG EC tablet     atorvastatin (LIPITOR) 40 MG tablet     Cholecalciferol 250 MCG (40183 UT) TABS      fluticasone (FLONASE) 50 MCG/ACT nasal spray     IBANdronate (BONIVA) 150 MG tablet     ibuprofen (ADVIL/MOTRIN) 800 MG tablet     lisinopril (ZESTRIL) 10 MG tablet     lisinopril (ZESTRIL) 5 MG tablet     Multiple Vitamin (MULTIVITAMIN ADULT PO)     multivitamin w/minerals (MULTI-VITAMIN) tablet     nicotine (NICORETTE) 2 MG gum     nitroGLYcerin (NITRODUR) 0.1 MG/HR 24 hr patch     Omega-3 1000 MG capsule     predniSONE (DELTASONE) 10 MG tablet     tiotropium (SPIRIVA) 18 MCG inhaled capsule     UNABLE TO FIND       No Known Allergies    Past Medical History:   Diagnosis Date     COPD (chronic obstructive pulmonary disease) (H)      Coronary artery disease due to lipid rich plaque 02/2021    Abnormal coronary CTA     Family history of myocardial infarction     mother under age 60's  CVA and MI     High cholesterol      Hypertension      PVC's (premature ventricular contractions)      Syncope 2012     Tubular adenoma of colon         Patient Active Problem List   Diagnosis     Premature Menopause     Osteopenia     Chest pain     Dyspnea on exertion     Coronary artery disease due to lipid rich plaque     Hypertension, unspecified type     Tobacco abuse     Mass of upper lobe of right lung     Malignant neoplasm of upper lobe of right lung (H)       Past Surgical History:   Procedure Laterality Date     APPENDECTOMY       SHOULDER SURGERY Right      TUBAL LIGATION         Family History   Problem Relation Age of Onset     Lung Cancer Father         lung     Breast Cancer Paternal Aunt      Cerebrovascular Disease Mother      Coronary Artery Disease Mother        Social history: Patient is . She is retired. She quit smoking in January 2021. She denies alcohol use. Denies illicit drug use.      ROS: Positive for palpitations, feet/leg swelling, shortness of breath, joint pain, itching, dizziness, easy bruising, insomnia, excessive tiredness, anxiety. Specifically negative for bowel/bladder dysfunction,  fevers,chills, appetite changes, unexplained weight loss.   Otherwise 13 systems reviewed are negative.  Please see the patient's intake questionnaire from today for details.      OBJECTIVE:  PHYSICAL EXAMINATION:    CONSTITUTIONAL:  Vital signs as above.  No acute distress.  The patient is well nourished and well groomed.  PSYCHIATRIC:  The patient is awake, alert, oriented to person, place, time and answering questions appropriately with clear speech.    HEENT: Normocephalic, atraumatic.  Sclera clear.  Neck is supple.  SKIN:  Skin over the face, bilateral lower extremities, and posterior torso is clean, dry, intact without rashes.    GAIT:  Gait is non-antalgic.  The patient is able to rise onto toes and heels bilaterally with support.  STANDING EXAMINATION: Mild tenderness palpation bilateral lower lumbar paraspinous muscles L5-S1. Significant tenderness to palpation bilateral sacroiliac joints. Positive Elizabeth finger test bilaterally. No significant tenderness to palpation bilateral greater trochanters. Lumbar flexion intact. Lumbar extension mildly restricted with increased pain.  MUSCLE STRENGTH:  The patient has 5/5 strength for the bilateral hip flexors, knee flexors/extensors, ankle dorsiflexors/plantar flexors, great toe extensors, ankle evertors/invertors.  NEUROLOGICAL:  2/4 patellar, and achilles reflexes bilaterally. Negative Babinski's bilaterally.  No ankle clonus bilaterally. Sensation to light touch is intact in the bilateral L4, L5, and S1 dermatomes.  VASCULAR:  2/4 dorsalis pedis pulses bilaterally.  Bilateral lower extremities are warm.  There is no pitting edema of the bilateral lower extremities.  ABDOMINAL:  Soft, non-distended, non-tender throughout all quadrants.  No pulsatile mass palpated in the left lower quadrant.  LYMPH NODES:  No palpable or tender inguinal lymph nodes.  MUSCULOSKELETAL: Negative pelvic distraction test. Positive thigh thrust right, negative thigh thrust left.  Positive Jay's test bilaterally reproducing pain localizing to the sacroiliac joints. Positive Gaenslen's test right, negative Gaenslen's test left. Positive Yeomans test bilaterally.    RESULTS: I reviewed the PET scan from [facility] dated February 4, 2021.  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    I reviewed an MRI lumbar spine from [facility] dated October 10, 2017.  This shows multilevel lumbar spondylosis.  At L2-3 there is mild central canal stenosis.  At L4-5 there is mild right foraminal stenosis.  At L5-S1 there is mild left foraminal stenosis.  Please here for further details.        Again, thank you for allowing me to participate in the care of your patient.        Sincerely,        Marbella Hernández PA-C

## 2021-10-15 NOTE — PATIENT INSTRUCTIONS
Bilateral sacroiliac joint injections have been ordered today.      Please note that this injection uses cortisone.  The cortisone may somewhat weaken the immune system.  It is unknown how much the immune system is weakened.  It is unknown if it is weakened to the point that you may be more likely to get the COVID-19 virus, or if you do get the COVID-19 virus, if you would be sicker than you would have been if you had not had the cortisone injection.  If you do not wish to proceed with the injection, please let the nurse/physician know and do NOT schedule the injection.    Please note that since your immune system is weakened from the cortisone, having a flu vaccine/shot may be less effective if you have this vaccine within 2 weeks from your cortisone injection.  It is advised to wait 2 weeks after your cortisone injection to have the flu shot (or if you have the flu shot first, wait 2 weeks before you have the cortisone injection).    Please schedule this injection at least 1 week  from now to allow time for insurance prior authorization.  On the day of your injection, you cannot be sick or taking antibiotics.  If you become sick and are prescribed, please call the clinic so your injection can be rescheduled for once you have completed your antibiotics.  You will need to bring a  with you for your injection.   If you have any questions or concerns prior to your injection, please do not hesitate to call the nurse navigation line at [phone] or contact Marbella Hernández through [employer].

## 2021-10-17 ENCOUNTER — HEALTH MAINTENANCE LETTER (OUTPATIENT)
Age: 76
End: 2021-10-17

## 2021-10-18 ENCOUNTER — LAB REQUISITION (OUTPATIENT)
Dept: LAB | Facility: CLINIC | Age: 76
End: 2021-10-18
Payer: MEDICARE

## 2021-10-18 DIAGNOSIS — I10 ESSENTIAL (PRIMARY) HYPERTENSION: ICD-10-CM

## 2021-10-18 DIAGNOSIS — E78.00 PURE HYPERCHOLESTEROLEMIA, UNSPECIFIED: ICD-10-CM

## 2021-10-18 LAB
ANION GAP SERPL CALCULATED.3IONS-SCNC: 10 MMOL/L (ref 5–18)
BUN SERPL-MCNC: 13 MG/DL (ref 8–28)
CALCIUM SERPL-MCNC: 10.9 MG/DL (ref 8.5–10.5)
CHLORIDE BLD-SCNC: 103 MMOL/L (ref 98–107)
CHOLEST SERPL-MCNC: 188 MG/DL
CO2 SERPL-SCNC: 26 MMOL/L (ref 22–31)
CREAT SERPL-MCNC: 0.78 MG/DL (ref 0.6–1.1)
FASTING STATUS PATIENT QL REPORTED: NO
GFR SERPL CREATININE-BSD FRML MDRD: 74 ML/MIN/1.73M2
GLUCOSE BLD-MCNC: 100 MG/DL (ref 70–125)
HDLC SERPL-MCNC: 84 MG/DL
LDLC SERPL CALC-MCNC: 91 MG/DL
POTASSIUM BLD-SCNC: 4.4 MMOL/L (ref 3.5–5)
SODIUM SERPL-SCNC: 139 MMOL/L (ref 136–145)
TRIGL SERPL-MCNC: 67 MG/DL

## 2021-10-18 PROCEDURE — 80061 LIPID PANEL: CPT | Mod: ORL | Performed by: FAMILY MEDICINE

## 2021-10-18 PROCEDURE — 80048 BASIC METABOLIC PNL TOTAL CA: CPT | Mod: ORL | Performed by: FAMILY MEDICINE

## 2021-10-25 ENCOUNTER — HOSPITAL ENCOUNTER (OUTPATIENT)
Dept: MRI IMAGING | Facility: CLINIC | Age: 76
Discharge: HOME | End: 2021-10-25
Attending: PHYSICIAN ASSISTANT | Admitting: PHYSICIAN ASSISTANT
Payer: MEDICARE

## 2021-10-25 DIAGNOSIS — M54.42 CHRONIC BILATERAL LOW BACK PAIN WITH BILATERAL SCIATICA: ICD-10-CM

## 2021-10-25 DIAGNOSIS — C34.11 MALIGNANT NEOPLASM OF UPPER LOBE OF RIGHT LUNG (H): ICD-10-CM

## 2021-10-25 DIAGNOSIS — G89.29 CHRONIC BILATERAL LOW BACK PAIN WITH BILATERAL SCIATICA: ICD-10-CM

## 2021-10-25 DIAGNOSIS — M54.41 CHRONIC BILATERAL LOW BACK PAIN WITH BILATERAL SCIATICA: ICD-10-CM

## 2021-10-25 DIAGNOSIS — M53.3 SACROILIAC JOINT PAIN: ICD-10-CM

## 2021-10-25 PROCEDURE — 72148 MRI LUMBAR SPINE W/O DYE: CPT

## 2021-10-26 ENCOUNTER — TELEPHONE (OUTPATIENT)
Dept: PHYSICAL MEDICINE AND REHAB | Facility: CLINIC | Age: 76
End: 2021-10-26

## 2021-10-26 NOTE — TELEPHONE ENCOUNTER
Call placed to patient with provider's results and recommendations. Pt stated understanding. She will proceed with SI joint injection as scheduled.

## 2021-10-26 NOTE — TELEPHONE ENCOUNTER
----- Message from Marbella Hernández PA-C sent at 10/26/2021 10:53 AM CDT -----  Please call this patient and let her know that I reviewed her MRI lumbar spine. This shows some wear and tear to the cartilage discs and joints in the lower back causing some narrowing around the nerves as the exit out of the spine, worse on the right.  I recommend moving forward with the SI joint injections as schedule since her exam was most consistent with SI joint involvement.  If the SI joint injections do not help, we could try lumbar MONO.

## 2021-11-04 ENCOUNTER — ANCILLARY PROCEDURE (OUTPATIENT)
Dept: PHYSICAL MEDICINE AND REHAB | Facility: CLINIC | Age: 76
End: 2021-11-04
Attending: PHYSICIAN ASSISTANT
Payer: MEDICARE

## 2021-11-04 VITALS
OXYGEN SATURATION: 90 % | TEMPERATURE: 99 F | DIASTOLIC BLOOD PRESSURE: 74 MMHG | SYSTOLIC BLOOD PRESSURE: 134 MMHG | HEART RATE: 80 BPM

## 2021-11-04 DIAGNOSIS — M53.3 SACROILIAC JOINT PAIN: ICD-10-CM

## 2021-11-04 PROCEDURE — 27096 INJECT SACROILIAC JOINT: CPT | Mod: 50 | Performed by: PHYSICAL MEDICINE & REHABILITATION

## 2021-11-04 PROCEDURE — 99207 PAIN JOINT INJECTION SACROILIAC JOINT BILATERAL: CPT | Mod: LT | Performed by: PHYSICAL MEDICINE & REHABILITATION

## 2021-11-04 RX ORDER — ROPIVACAINE HYDROCHLORIDE 5 MG/ML
INJECTION, SOLUTION EPIDURAL; INFILTRATION; PERINEURAL
Status: COMPLETED | OUTPATIENT
Start: 2021-11-04 | End: 2021-11-04

## 2021-11-04 RX ORDER — LIDOCAINE HYDROCHLORIDE 10 MG/ML
INJECTION, SOLUTION EPIDURAL; INFILTRATION; INTRACAUDAL; PERINEURAL
Status: COMPLETED | OUTPATIENT
Start: 2021-11-04 | End: 2021-11-04

## 2021-11-04 RX ORDER — METHYLPREDNISOLONE ACETATE 80 MG/ML
INJECTION, SUSPENSION INTRA-ARTICULAR; INTRALESIONAL; INTRAMUSCULAR; SOFT TISSUE
Status: COMPLETED | OUTPATIENT
Start: 2021-11-04 | End: 2021-11-04

## 2021-11-04 RX ADMIN — LIDOCAINE HYDROCHLORIDE 1 ML: 10 INJECTION, SOLUTION EPIDURAL; INFILTRATION; INTRACAUDAL; PERINEURAL at 13:13

## 2021-11-04 RX ADMIN — METHYLPREDNISOLONE ACETATE 80 MG: 80 INJECTION, SUSPENSION INTRA-ARTICULAR; INTRALESIONAL; INTRAMUSCULAR; SOFT TISSUE at 13:14

## 2021-11-04 RX ADMIN — ROPIVACAINE HYDROCHLORIDE 1 ML: 5 INJECTION, SOLUTION EPIDURAL; INFILTRATION; PERINEURAL at 13:14

## 2021-11-04 ASSESSMENT — PAIN SCALES - GENERAL
PAINLEVEL: NO PAIN (1)
PAINLEVEL: NO PAIN (1)

## 2021-11-04 NOTE — Clinical Note
11/4/2021         RE: Shantel Romero  [address]  [place] [address]        Dear Colleague,    Thank you for referring your patient, Shantel Romero, to the [facility] [place]. Please see a copy of my visit note below.    No notes on file    Again, thank you for allowing me to participate in the care of your patient.        Sincerely,        Sofy Swanson, DO

## 2021-11-04 NOTE — PATIENT INSTRUCTIONS
DISCHARGE INSTRUCTIONS    During office hours (8:00 a.m.- 4:00 p.m.) questions or concerns may be answered  by calling Spine Center Navigation Nurses at  [phone].  Messages received after hours will be returned the following business day.      In the case of an emergency, please dial 911 or seek assistance at the nearest Emergency Room/Urgent Care facility.     All Patients:  ? You may experience an increase in your symptoms for the first 2 days (It may take anywhere between 2 days- 2 weeks for the steroid to have maximum effect).    ? You may use ice on the injection site, as frequently as 20 minutes each hour if needed.    ? You may take your pain medicine.    ? You may continue taking your regular medication.    ? You may shower. No swimming, tub bath or hot tub for 48 hours.  You may remove your bandaid/bandage as soon as you are home.    ? You may resume light activities, as tolerated.    ? Resume your usual diet as tolerated.    ? It is strongly advised that you do not drive for 1-3 hours post injection.    ? If you have had oral sedation:  Do not drive for 8 hours post injection.      ? If you have had IV sedation:  Do not drive for 24 hours post injection.  Do not operate hazardous machinery or make important personal/business decisions for 24 hours.    POSSIBLE STEROID SIDE EFFECTS (If steroid/cortisone was used for your procedure)    -If you experience these symptoms, it should only last for a short period      Swelling of the legs                Skin redness (flushing)       Mouth (oral) irritation     Blood sugar (glucose) levels              Sweats                     Mood changes    Headache    Weakened immune system for up to 14 days, which could increase the risk of [place]ina the COVID-19 virus and/or experiencing more severe symptoms of the disease, if exposed.         POSSIBLE PROCEDURE SIDE EFFECTS    -Call the Spine Center if you are concerned      Increased Pain             Increased  numbness/tingling        Nausea/Vomiting            Bruising/bleeding at site        Redness or swelling                                                Difficulty walking        Weakness            Fever greater than 100.5    *In the event of a severe headache after an epidural steroid injection that is relieved by lying down, please call the [facility] to speak with a clinical staff member*

## 2021-11-16 NOTE — PROGRESS NOTES
Assessment:   Shantel Romero is a 76 year old y.o. female with past medical history significant for  lung cancer (status post radiation), coronary artery disease, hypertension, osteopenia who presents today for follow-up regarding chronic and worsening right greater than left low back pain with radiation into the right greater than left buttock.  My review of an MRI lumbar spine shows multilevel degenerative change.  At L4-5 there is a right foraminal/far lateral disc protrusion causing severe right foraminal stenosis.  When I evaluated the patient 10/15/2021 I feel she is primarily symptomatic from sacroiliac joint dysfunction.  Patient status post bilateral sacroiliac joint injections 11/4/2021 which she reported provided 85% relief for 2 to 3 days but after that no relief.       Plan:     A shared decision making plan was used.  The patient's values and choices were respected.  The following represents what was discussed and decided upon by the physician assistant and the patient.      1.  DIAGNOSTIC TESTS: I reviewed the MRI lumbar spine.  No further diagnostic tests were ordered.    2.  PHYSICAL THERAPY: Patient is scheduled to begin physical therapy 11/30/2021.    3.  MEDICATIONS:  No changes are made to the patient's medications. She takes Tylenol and ibuprofen as needed.    4.  INTERVENTIONS:   -I offered the patient a right L4-5 transforaminal epidural steroid injection.  I chose this injection next because pain is worse on the right than the left.  I suspect pain radiating into the buttock represents proximal radiculitis.  Left-sided pain may be compensatory.  Patient indicated she would like to proceed and an order was placed.  -If that does not help, I recommend bilateral L3, L4, L5 medial branch blocks as a work-up toward radiofrequency ablation.    5.  PATIENT EDUCATION: Patient is in agreement the above plan.  All questions were answered.    6.  FOLLOW-UP: Patient will follow up with me 2 weeks  after her right L4-5 transforaminal epidural steroid injection.  If she has questions or concerns in the meantime, she should not hesitate to call.    Subjective:     Shantel Romero is a 76 year old female who presents today for follow-up regarding low back pain with radiation into the bilateral lower extremities.  Patient is status post bilateral sacroiliac joint injections 11/4/2021.  Patient reports the injection provided 85% relief of her pain for 2 to 3 days but then same pain returned.    Patient complains of right greater than left low back pain.  Pain extends into the right greater than left buttock.  She denies pain further down the legs today.  Just today she had some pain radiating up toward the right shoulder blade, but that is not typical.  She has chronic numbness and tingling in her feet which is stable.  She denies any new numbness or tingling.  She feels weak in her back.  She rates her pain today as a 9 out of 10.  At its worst it is a 9 out of 10.  At its best it is a 2 out of 10.  Pain is aggravated with cleaning, doing housework, changing the sheets.  Pain is alleviated with lying down.  She denies any new symptoms since she was last seen.    Patient did physical therapy many years ago.  She is scheduled to return to physical therapy 11/30/2021.  She uses Tylenol and ibuprofen as needed but she does not feel like these are helpful.    Review of Systems:  Positive for weakness.  Negative for numbness/tingling, loss of bowel/bladder control, inability to urinate, headache, pain much worse at night, trip/stumble/falls, difficulty swallowing, difficulty with hand skills, fevers, unintentional weight loss.     Objective:   CONSTITUTIONAL:  Vital signs as above.  No acute distress.  The patient is well nourished and well groomed.    PSYCHIATRIC:  The patient is awake, alert, oriented to person, place and time.  The patient is answering questions appropriately with clear speech.  Normal  affect.  HEENT: Normocephalic, atraumatic.  Sclera clear.    SKIN:  Skin over the face, posterior torso, bilateral upper and lower extremities is clean, dry, intact without rashes.  VASCULAR: No significant lower extremity edema.  MUSCULOSKELETAL:  Gait is non-antalgic.  The patient is able to rise onto toes and heels bilaterally with support.  Significant tenderness to palpation right lower lumbar paraspinous muscles.  More mild tenderness palpation left lower lumbar paraspinous muscles.  Mild tenderness palpation bilateral sacroiliac joints.  Lumbar flexion intact.  Lumbar extension mildly restricted.  Lumbar facet loading maneuvers did not reproduce pain today.   The patient has 5/5 strength for the bilateral hip flexors, knee flexors/extensors, ankle dorsiflexors/plantar flexors, ankle evertors/invertors.    NEUROLOGICAL: 2+ patellar, achilles reflexes which are symmetric bilaterally.  No ankle clonus bilaterally.  Subjective diminished sensation both feet stocking distribution.     RESULTS: I reviewed the MRI lumbar spine from [facility] dated 10/25/2021.  This shows progression of multilevel degenerative changes since August 2017.  No evidence for metastatic disease.  At L2-3 there is severe disc degeneration and a asymmetric left disc bulge with mild spinal canal stenosis and mild to moderate bilateral foraminal stenosis worse on the left.  At L3-4 there is a disc bulge with mild spinal canal stenosis and moderate bilateral foraminal stenosis.  At L4-5 there is a generalized disc bulge with superimposed right foraminal/far lateral disc extrusion.  There is severe right foraminal stenosis.  At L5-S1 there is a disc bulge and mild facet arthropathy with mild to moderate bilateral foraminal stenosis.

## 2021-11-19 ENCOUNTER — OFFICE VISIT (OUTPATIENT)
Dept: PHYSICAL MEDICINE AND REHAB | Facility: CLINIC | Age: 76
End: 2021-11-19
Payer: MEDICARE

## 2021-11-19 VITALS
SYSTOLIC BLOOD PRESSURE: 115 MMHG | BODY MASS INDEX: 25.83 KG/M2 | WEIGHT: 155 LBS | DIASTOLIC BLOOD PRESSURE: 66 MMHG | HEIGHT: 65 IN | HEART RATE: 91 BPM

## 2021-11-19 DIAGNOSIS — M48.061 LUMBAR FORAMINAL STENOSIS: ICD-10-CM

## 2021-11-19 DIAGNOSIS — M51.26 LUMBAR DISC HERNIATION: Primary | ICD-10-CM

## 2021-11-19 DIAGNOSIS — M47.816 LUMBAR FACET ARTHROPATHY: ICD-10-CM

## 2021-11-19 PROCEDURE — 99214 OFFICE O/P EST MOD 30 MIN: CPT | Performed by: PHYSICIAN ASSISTANT

## 2021-11-19 ASSESSMENT — MIFFLIN-ST. JEOR: SCORE: 1188.96

## 2021-11-19 NOTE — PATIENT INSTRUCTIONS
A right L4/5 epidural steroid injection has been ordered today.      Please note that this injection uses cortisone.  The cortisone may somewhat weaken the immune system.  It is unknown how much the immune system is weakened.  It is unknown if it is weakened to the point that you may be more likely to get the COVID-19 virus, or if you do get the COVID-19 virus, if you would be sicker than you would have been if you had not had the cortisone injection.  If you do not wish to proceed with the injection, please let the nurse/physician know and do NOT schedule the injection.    Please note that since your immune system is weakened from the cortisone, having a vaccine/shot may be less effective if you have this vaccine within 2 weeks from your cortisone injection.  It is advised to wait 2 weeks after your cortisone injection to have the vaccine (or if you have the vaccine, wait 2 weeks before you have the cortisone injection).    Please schedule this injection at least 1 week  from now to allow time for insurance prior authorization.  On the day of your injection, you cannot be sick or taking antibiotics.  If you become sick and are prescribed, please call the clinic so your injection can be rescheduled for once you have completed your antibiotics.  You will need to bring a  with you for your injection.   If you have any questions or concerns prior to your injection, please do not hesitate to call the nurse navigation line at [phone] or contact Marbella Hernández through [employer].

## 2021-11-19 NOTE — LETTER
11/19/2021         RE: Shantel Romero  [address]  [place] [address]        Dear Colleague,    Thank you for referring your patient, Shantel Romero, to the [facility] [place]. Please see a copy of my visit note below.    Assessment:   Shantel Romero is a 76 year old y.o. female with past medical history significant for  lung cancer (status post radiation), coronary artery disease, hypertension, osteopenia who presents today for follow-up regarding chronic and worsening right greater than left low back pain with radiation into the right greater than left buttock.  My review of an MRI lumbar spine shows multilevel degenerative change.  At L4-5 there is a right foraminal/far lateral disc protrusion causing severe right foraminal stenosis.  When I evaluated the patient 10/15/2021 I feel she is primarily symptomatic from sacroiliac joint dysfunction.  Patient status post bilateral sacroiliac joint injections 11/4/2021 which she reported provided 85% relief for 2 to 3 days but after that no relief.       Plan:     A shared decision making plan was used.  The patient's values and choices were respected.  The following represents what was discussed and decided upon by the physician assistant and the patient.      1.  DIAGNOSTIC TESTS: I reviewed the MRI lumbar spine.  No further diagnostic tests were ordered.    2.  PHYSICAL THERAPY: Patient is scheduled to begin physical therapy 11/30/2021.    3.  MEDICATIONS:  No changes are made to the patient's medications. She takes Tylenol and ibuprofen as needed.    4.  INTERVENTIONS:   -I offered the patient a right L4-5 transforaminal epidural steroid injection.  I chose this injection next because pain is worse on the right than the left.  I suspect pain radiating into the buttock represents proximal radiculitis.  Left-sided pain may be compensatory.  Patient indicated she would like to proceed and an order was placed.  -If that  does not help, I recommend bilateral L3, L4, L5 medial branch blocks as a work-up toward radiofrequency ablation.    5.  PATIENT EDUCATION: Patient is in agreement the above plan.  All questions were answered.    6.  FOLLOW-UP: Patient will follow up with me 2 weeks after her right L4-5 transforaminal epidural steroid injection.  If she has questions or concerns in the meantime, she should not hesitate to call.    Subjective:     Shantel Romero is a 76 year old female who presents today for follow-up regarding low back pain with radiation into the bilateral lower extremities.  Patient is status post bilateral sacroiliac joint injections 11/4/2021.  Patient reports the injection provided 85% relief of her pain for 2 to 3 days but then same pain returned.    Patient complains of right greater than left low back pain.  Pain extends into the right greater than left buttock.  She denies pain further down the legs today.  Just today she had some pain radiating up toward the right shoulder blade, but that is not typical.  She has chronic numbness and tingling in her feet which is stable.  She denies any new numbness or tingling.  She feels weak in her back.  She rates her pain today as a 9 out of 10.  At its worst it is a 9 out of 10.  At its best it is a 2 out of 10.  Pain is aggravated with cleaning, doing housework, changing the sheets.  Pain is alleviated with lying down.  She denies any new symptoms since she was last seen.    Patient did physical therapy many years ago.  She is scheduled to return to physical therapy 11/30/2021.  She uses Tylenol and ibuprofen as needed but she does not feel like these are helpful.    Review of Systems:  Positive for weakness.  Negative for numbness/tingling, loss of bowel/bladder control, inability to urinate, headache, pain much worse at night, trip/stumble/falls, difficulty swallowing, difficulty with hand skills, fevers, unintentional weight loss.     Objective:    CONSTITUTIONAL:  Vital signs as above.  No acute distress.  The patient is well nourished and well groomed.    PSYCHIATRIC:  The patient is awake, alert, oriented to person, place and time.  The patient is answering questions appropriately with clear speech.  Normal affect.  HEENT: Normocephalic, atraumatic.  Sclera clear.    SKIN:  Skin over the face, posterior torso, bilateral upper and lower extremities is clean, dry, intact without rashes.  VASCULAR: No significant lower extremity edema.  MUSCULOSKELETAL:  Gait is non-antalgic.  The patient is able to rise onto toes and heels bilaterally with support.  Significant tenderness to palpation right lower lumbar paraspinous muscles.  More mild tenderness palpation left lower lumbar paraspinous muscles.  Mild tenderness palpation bilateral sacroiliac joints.  Lumbar flexion intact.  Lumbar extension mildly restricted.  Lumbar facet loading maneuvers did not reproduce pain today.   The patient has 5/5 strength for the bilateral hip flexors, knee flexors/extensors, ankle dorsiflexors/plantar flexors, ankle evertors/invertors.    NEUROLOGICAL: 2+ patellar, achilles reflexes which are symmetric bilaterally.  No ankle clonus bilaterally.  Subjective diminished sensation both feet stocking distribution.     RESULTS: I reviewed the MRI lumbar spine from [facility] dated 10/25/2021.  This shows progression of multilevel degenerative changes since August 2017.  No evidence for metastatic disease.  At L2-3 there is severe disc degeneration and a asymmetric left disc bulge with mild spinal canal stenosis and mild to moderate bilateral foraminal stenosis worse on the left.  At L3-4 there is a disc bulge with mild spinal canal stenosis and moderate bilateral foraminal stenosis.  At L4-5 there is a generalized disc bulge with superimposed right foraminal/far lateral disc extrusion.  There is severe right foraminal stenosis.  At L5-S1 there is a disc bulge and mild facet arthropathy  with mild to moderate bilateral foraminal stenosis.          Again, thank you for allowing me to participate in the care of your patient.        Sincerely,        Marbella Hernández PA-C

## 2021-11-30 ENCOUNTER — HOSPITAL ENCOUNTER (OUTPATIENT)
Dept: PHYSICAL THERAPY | Facility: REHABILITATION | Age: 76
End: 2021-11-30
Attending: PHYSICIAN ASSISTANT
Payer: MEDICARE

## 2021-11-30 DIAGNOSIS — M53.3 SACROILIAC JOINT PAIN: Primary | ICD-10-CM

## 2021-11-30 DIAGNOSIS — R29.3 POOR POSTURE: ICD-10-CM

## 2021-11-30 PROCEDURE — 97161 PT EVAL LOW COMPLEX 20 MIN: CPT | Mod: GP | Performed by: PHYSICAL THERAPIST

## 2021-11-30 PROCEDURE — 97110 THERAPEUTIC EXERCISES: CPT | Mod: GP | Performed by: PHYSICAL THERAPIST

## 2021-11-30 NOTE — PROGRESS NOTES
[facility]    OUTPATIENT PHYSICAL THERAPY ORTHOPEDIC EVALUATION  PLAN OF TREATMENT FOR OUTPATIENT REHABILITATION  (COMPLETE FOR INITIAL CLAIMS ONLY)  Patient's Last Name, First Name, M.I.  YOB: 1945  Shantel Romero    Provider s Name:  [facility]   Medical Record No.  [id number]   Start of Care Date:  11/30/21   Onset Date:  10/15/21   Type:     _X__PT   ___OT   ___SLP Medical Diagnosis:  SIJ pain     PT Diagnosis:  thoracic to lumbar spine pain   Visits from SOC:  1      _________________________________________________________________________________  Plan of Treatment/Functional Goals:  gait training,joint mobilization,manual therapy,neuromuscular re-education,ROM,strengthening,stretching     TENS     Goals  Goal Identifier: 1  Goal Description: Patient will be able to vacuum one room in her house without difficulty or increased symtpoms  Target Date: 02/28/22    Goal Identifier: 2  Goal Description: Patient will be able to unload the  with only having to take 1 break to avoid pain  Target Date: 02/28/22    Goal Identifier: 3  Goal Description: Patient will be able to walk for longer than 1 miles without increased LBP or difficult  Target Date: 01/29/22      Therapy Frequency:  1 time/week  Predicted Duration of Therapy Intervention:  6 weeks    GRISEL PATTON, PT                 I CERTIFY THE NEED FOR THESE SERVICES FURNISHED UNDER        THIS PLAN OF TREATMENT AND WHILE UNDER MY CARE     (Physician co-signature of this document indicates review and certification of the therapy plan).                       Certification Date From:  11/30/21   Certification Date To:  02/28/22    Referring Provider:  Marbella Hernández PA-C    Initial Assessment        See Epic Evaluation Start of Care Date: 11/30/21                                                                             [facility]   Lumbo-Pelvic Initial Evaluation    Assessment:     Patient is a 76 year old female that presents with signs and symptoms consistent with SIJ pain secondary to shows multilevel degenerative change with L4-5 right foraminal/far lateral disc protrusion causing severe right foraminal stenosis per MRI imaging. Patient demonstrates impairments including decreased core stability and postural awareness, with myofascial restrictions along thoracolumbar spine, leading to impaired functional mobility. Patient's functional limitations include vacuuming, unloading/loading the , walking for longer periods of time and any household work with bending/lifting/twisting. Today patient responded well to patient education and therapeutic exercise. Patient will be getting injection on Thursday, and will decide then if she wants to continue with therapy. Patient vocalized she doesn't think she will be consistent with her home exercise program enough to find benefit.    Patient educated, demonstrated understanding and is in agreement with nature of impairment, plan of care, patient role and HEP. Patient would benefit from skilled PT to progress and improve above impairments.    Treatment Today       Exercise   Date 11/30/2021   Exercise    Reach and roll X 10 bilaterally   Hip Roll x20   Doorway pec stretch 20-30s                                         GRISEL PATTON, PT  11/30/2021 11/30/21 1100   General Information   Type of Visit Initial OP Ortho PT Evaluation   Start of Care Date 11/30/21   Referring Physician Marbella Hernández PA-C   Orders Evaluate and Treat   Date of Order 10/15/21   Certification Required? Yes   Medical Diagnosis SIJ pain   Body Part(s)   Body Part(s) Lumbar Spine/SI   Presentation and Etiology   Pertinent history of current problem (include personal factors and/or comorbidities that impact the POC) Shantel is a 76 year old female who presents to  therapy today with complaints of low back pain. Date of onset is October 2021 and onset was in 1963. Patient reports today she is having some stomach issues that have been bothering her more than her low back. Patient does have lung cancer, she has had a CT, will wait in February to see if the tumor is dead from radiation or still slow growing, and her back is feeling like it is pushing into the lungs, hard to breath sometimes too. Patient did go thru therapy years ago, she didn't feel like it had helped her at all, except for the heat, she didn't like doing her exercises and admits she won't be consistent with her exercises. Bending and twisting increase her symptoms, mostly because of the breathing.    Impairments A. Pain;E. Decreased flexibility;F. Decreased strength and endurance   Functional Limitations perform activities of daily living   Symptom Location R>L thoracic spine into low back   How/Where did it occur With repetition/overuse;From insidious onset   Onset date of current episode/exacerbation 10/15/21   Chronicity Chronic   Pain quality H. Other   Pain quality comment it is just a pain   Frequency of pain/symptoms C. With activity   Pain/symptoms are: The same all the time   Pain/symptoms exacerbated by B. Walking;C. Lifting;D. Carrying;G. Certain positions;I. Bending  (vacuuming )   Pain/symptoms eased by C. Rest;E. Changing positions   Progression of symptoms since onset: Worsened   Fall Risk Screen   Fall screen completed by PT   Have you fallen 2 or more times in the past year? No   Have you fallen and had an injury in the past year? No   Is patient a fall risk? No   Abuse Screen (yes response referral indicated)   Feels Unsafe at Home or Work/School no   Feels Threatened by Someone no   Does Anyone Try to Keep You From Having Contact with Others or Doing Things Outside Your Home? no   Physical Signs of Abuse Present no   Lumbar Spine/SI Objective Findings   Flexion ROM fingers to shins   Extension  ROM slight tightness   Right Side Bending ROM pulling   Left Side Bending ROM pulling   Hip Screen WNL, slight R>L ROM limits. L hip joint pain with NEGIN   Lumbar/Hip/Knee/Foot Strength Comments WFL   SLR Negative   Slump Test negative   Palpation TTP of R distal QL/PSIS, R thoracic paraspinals   Planned Therapy Interventions   Planned Therapy Interventions gait training;joint mobilization;manual therapy;neuromuscular re-education;ROM;strengthening;stretching   Planned Modality Interventions   Planned Modality Interventions TENS   Clinical Impression   Criteria for Skilled Therapeutic Interventions Met yes, treatment indicated   PT Diagnosis thoracic to lumbar spine pain   Influenced by the following impairments poor postural awareness, poor core stability, limited thoracic mobility   Functional limitations due to impairments bending, twisting, doing her , vacuuming   Clinical Presentation Stable/Uncomplicated   Clinical Decision Making (Complexity) Low complexity   Therapy Frequency 1 time/week   Predicted Duration of Therapy Intervention (days/wks) 6 weeks   Risk & Benefits of therapy have been explained Yes   Patient, Family & other staff in agreement with plan of care Yes   Clinical Impression Comments Patient is a 76 year old female that presents with signs and symptoms consistent with SIJ pain secondary to shows multilevel degenerative change with L4-5 right foraminal/far lateral disc protrusion causing severe right foraminal stenosis per MRI imaging. Patient demonstrates impairments including decreased core stability and postural awareness, with myofascial restrictions along thoracolumbar spine, leading to impaired functional mobility. Patient's functional limitations include vacuuming, unloading/loading the , walking for longer periods of time and any household work with bending/lifting/twisting. Today patient responded well to patient education and therapeutic exercise. Patient will be  getting injection on Thursday, and will decide then if she wants to continue with therapy. Patient vocalized she doesn't think she will be consistent with her home exercise program enough to find benefit.   ORTHO GOALS   PT Ortho Eval Goals 1;2;3   Ortho Goal 1   Goal Identifier 1   Goal Description Patient will be able to vacuum one room in her house without difficulty or increased symtpoms   Target Date 02/28/22   Ortho Goal 2   Goal Identifier 2   Goal Description Patient will be able to unload the  with only having to take 1 break to avoid pain   Target Date 02/28/22   Ortho Goal 3   Goal Identifier 3   Goal Description Patient will be able to walk for longer than 1 miles without increased LBP or difficult   Target Date 01/29/22   Total Evaluation Time   PT Eval, Low Complexity Minutes (34864) 30   Therapy Certification   Certification date from 11/30/21   Certification date to 02/28/22   Medical Diagnosis SIJ pain

## 2021-12-02 ENCOUNTER — ANCILLARY PROCEDURE (OUTPATIENT)
Dept: PHYSICAL MEDICINE AND REHAB | Facility: CLINIC | Age: 76
End: 2021-12-02
Attending: PHYSICIAN ASSISTANT
Payer: MEDICARE

## 2021-12-02 VITALS
OXYGEN SATURATION: 94 % | TEMPERATURE: 98.2 F | HEART RATE: 87 BPM | SYSTOLIC BLOOD PRESSURE: 116 MMHG | DIASTOLIC BLOOD PRESSURE: 72 MMHG

## 2021-12-02 DIAGNOSIS — M48.061 LUMBAR FORAMINAL STENOSIS: ICD-10-CM

## 2021-12-02 DIAGNOSIS — M51.26 LUMBAR DISC HERNIATION: ICD-10-CM

## 2021-12-02 PROCEDURE — 64483 NJX AA&/STRD TFRM EPI L/S 1: CPT | Mod: RT | Performed by: PHYSICAL MEDICINE & REHABILITATION

## 2021-12-02 RX ORDER — LIDOCAINE HYDROCHLORIDE 10 MG/ML
INJECTION, SOLUTION EPIDURAL; INFILTRATION; INTRACAUDAL; PERINEURAL
Status: COMPLETED | OUTPATIENT
Start: 2021-12-02 | End: 2021-12-02

## 2021-12-02 RX ORDER — METHYLPREDNISOLONE ACETATE 80 MG/ML
INJECTION, SUSPENSION INTRA-ARTICULAR; INTRALESIONAL; INTRAMUSCULAR; SOFT TISSUE
Status: COMPLETED | OUTPATIENT
Start: 2021-12-02 | End: 2021-12-02

## 2021-12-02 RX ADMIN — LIDOCAINE HYDROCHLORIDE 1 ML: 10 INJECTION, SOLUTION EPIDURAL; INFILTRATION; INTRACAUDAL; PERINEURAL at 11:49

## 2021-12-02 RX ADMIN — METHYLPREDNISOLONE ACETATE 80 MG: 80 INJECTION, SUSPENSION INTRA-ARTICULAR; INTRALESIONAL; INTRAMUSCULAR; SOFT TISSUE at 11:49

## 2021-12-02 ASSESSMENT — PAIN SCALES - GENERAL
PAINLEVEL: NO PAIN (0)
PAINLEVEL: NO PAIN (0)

## 2021-12-02 NOTE — PATIENT INSTRUCTIONS
DISCHARGE INSTRUCTIONS    During office hours (8:00 a.m.- 4:00 p.m.) questions or concerns may be answered  by calling Spine Center Navigation Nurses at  [phone].  Messages received after hours will be returned the following business day.      In the case of an emergency, please dial 911 or seek assistance at the nearest Emergency Room/Urgent Care facility.     All Patients:  ? You may experience an increase in your symptoms for the first 2 days (It may take anywhere between 2 days- 2 weeks for the steroid to have maximum effect).    ? You may use ice on the injection site, as frequently as 20 minutes each hour if needed.    ? You may take your pain medicine.    ? You may continue taking your regular medication.    ? You may shower. No swimming, tub bath or hot tub for 48 hours.  You may remove your bandaid/bandage as soon as you are home.    ? You may resume light activities, as tolerated.    ? Resume your usual diet as tolerated.    ? It is strongly advised that you do not drive for 1-3 hours post injection.    ? If you have had oral sedation:  Do not drive for 8 hours post injection.      ? If you have had IV sedation:  Do not drive for 24 hours post injection.  Do not operate hazardous machinery or make important personal/business decisions for 24 hours.    POSSIBLE STEROID SIDE EFFECTS (If steroid/cortisone was used for your procedure)    -If you experience these symptoms, it should only last for a short period      Swelling of the legs                Skin redness (flushing)       Mouth (oral) irritation     Blood sugar (glucose) levels              Sweats                     Mood changes    Headache    Weakened immune system for up to 14 days, which could increase the risk of [place] the COVID-19 virus and/or experiencing more severe symptoms of the disease, if exposed.         POSSIBLE PROCEDURE SIDE EFFECTS    -Call the Spine Center if you are concerned      Increased Pain             Increased  numbness/tingling        Nausea/Vomiting            Bruising/bleeding at site        Redness or swelling                                                Difficulty walking        Weakness            Fever greater than 100.5    *In the event of a severe headache after an epidural steroid injection that is relieved by lying down, please call the [facility] to speak with a clinical staff member*

## 2021-12-02 NOTE — Clinical Note
12/2/2021         RE: Shantel Romero  [address]  [place] [address]        Dear Colleague,    Thank you for referring your patient, Shantel Romero, to the [facility] [place]. Please see a copy of my visit note below.    No notes on file    Again, thank you for allowing me to participate in the care of your patient.        Sincerely,        Sofy Swanson, DO

## 2021-12-21 NOTE — ADDENDUM NOTE
Encounter addended by: Abiola Aparicio, PT on: 12/21/2021 7:59 AM   Actions taken: Clinical Note Signed

## 2021-12-21 NOTE — PROGRESS NOTES
Assessment:   Shantel Romero is a 76 year old y.o. female with past medical history significant for  lung cancer (status post radiation), coronary artery disease, hypertension, osteopenia who presents today for follow-up regarding chronic and worsening right greater than left low back pain with radiation into the right greater than left buttock.  My review of an MRI lumbar spine shows multilevel degenerative change.  At L4-5 there is a right foraminal/far lateral disc protrusion causing severe right foraminal stenosis.    -Patient status post a right L4-5 transforaminal epidural steroid injection December 2, 2021 which did not provide any relief of her pain.  -Patient previously had bilateral sacroiliac joint injections 11/4/2021 which she reported provided 85% relief for 2 to 3 days but after that no relief.  -Within the differential is referred pain from the lower lumbar facets and myofascial pain.  She is neurologically intact.       Plan:     A shared decision making plan was used.  The patient's values and choices were respected.  The following represents what was discussed and decided upon by the physician assistant and the patient.      1.  DIAGNOSTIC TESTS: I reviewed the MRI lumbar spine.  No further diagnostic tests were ordered.    2.  PHYSICAL THERAPY: Patient went to 1 session of physical therapy November 30, 2021.  She states she did not go back for any additional physical therapy because she has tried medical therapy before and it was not helpful.  I did offer to refer her to a different type of physical therapy, such as pool therapy, but she declined.    3.  MEDICATIONS:  No changes are made to the patient's medications. She takes Tylenol and ibuprofen as needed.  She also uses CBD.  Topical pain relievers have not been helpful.    4.  INTERVENTIONS: No interventions were ordered today.  Patient indicated that she is going to focus on other aspects of her health for now.  She feels like her pain  has been moving into different locations since her last injection.  I asked her to keep track of her pain so that we can get a better sense of where her pain is coming from.  -If pain persist, we could try trigger point injections under ultrasound guidance as the next step.  She believes she had trigger point injections more than 10 years ago which were helpful for her pain at that time  -If that does not help, I recommend bilateral L3, L4, L5 medial branch blocks as a work-up toward radiofrequency ablation.  However, I would like her to trial additional physical therapy before pursuing this route.      5.  PATIENT EDUCATION: Patient is in agreement the above plan.  All questions were answered.  -Patient has tried a sacroiliac joint belt and it does not stay in place.    6.  FOLLOW-UP: Patient is going to follow-up as needed.  If she has questions or concerns, she should not hesitate to call.    Subjective:     Shantel Romero is a 76 year old female who presents today for follow-up regarding low back pain with radiation into the bilateral buttocks.  Patient is status post a right L4-5 transforaminal epidural steroid injection December 7, 2021.  Patient reports no improvement in her pain.    Patient complains of right greater than left low back pain.  Patient is somewhat of a vague historian.  She states that since the injection she has felt some pain in different locations in the lower back.  She points to an area higher up on the right side where she thinks she has been experiencing pain, although she does not have pain there today.  Pain is completely dependent on her activity.  She states that when she is doing housework, especially vacuuming, her pain can be severe.  At other times the pain goes away completely.  She states that she has to stop doing the aggravating activity in order to get relief.    Patient went to 1 session of physical therapy November 30, 2020.  She states she did not want to go to any  additional physical therapy because she tried physical therapy several years ago in [place] and it was not helpful.  The only thing that was helpful when she went to physical therapy was applying heat.  She uses CBD which is somewhat helpful.    Review of Systems:  Positive for weakness.  Negative for numbness/tingling, loss of bowel/bladder control, inability to urinate, headache, pain much worse at night, trip/stumble/falls, difficulty swallowing, difficulty with hand skills, fevers, unintentional weight loss.     Objective:   CONSTITUTIONAL:  Vital signs as above.  No acute distress.  The patient is well nourished and well groomed.    PSYCHIATRIC:  The patient is awake, alert, oriented to person, place and time.  The patient is answering questions appropriately with clear speech.  Normal affect.  HEENT: Normocephalic, atraumatic.  Sclera clear.    SKIN:  Skin over the face, posterior torso, bilateral upper and lower extremities is clean, dry, intact without rashes.  VASCULAR: No significant lower extremity edema.  MUSCULOSKELETAL:  Gait is non-antalgic.  The patient is able to rise onto toes and heels bilaterally with support.  No significant tenderness to palpation bilateral lower lumbar paraspinous muscles.  Moderate tenderness palpation bilateral sacroiliac joints.  The patient has 5/5 strength for the bilateral hip flexors, knee flexors/extensors, ankle dorsiflexors/plantar flexors, ankle evertors/invertors.    NEUROLOGICAL: 2+ patellar reflexes which are symmetric bilaterally.  Sensation to light touch intact bilateral lower extremities throughout.     RESULTS: I reviewed the MRI lumbar spine from [facility] dated 10/25/2021.  This shows progression of multilevel degenerative changes since August 2017.  No evidence for metastatic disease.  At L2-3 there is severe disc degeneration and a asymmetric left disc bulge with mild spinal canal stenosis and mild to moderate bilateral foraminal stenosis worse on the  left.  At L3-4 there is a disc bulge with mild spinal canal stenosis and moderate bilateral foraminal stenosis.  At L4-5 there is a generalized disc bulge with superimposed right foraminal/far lateral disc extrusion.  There is severe right foraminal stenosis.  At L5-S1 there is a disc bulge and mild facet arthropathy with mild to moderate bilateral foraminal stenosis.

## 2021-12-21 NOTE — PROGRESS NOTES
[facility] Discharge Summary  Patient Name: Shantel Romero  Date: 12/21/2021  Referral Diagnosis: SIJ pain    Patient was seen for initial visit for physical therapy of SIJ pain on 11/30/21 with no follow up appointments. Patient stated she did not think she would be consistent with her HEP so she chooses not to return after injection.     Reason for Discharge: Patient chooses to discontinue therapy.  Patient has failed to schedule further appointments.    Discharge Plan: Patient to continue home program.    Therapy will be discontinued at this time.  The patient will need a new referral to resume physical therapy treatment. Please see below for patient's current status.          Thank you for your referral.  GRISEL PATTON, PT, DPT  12/21/2021  7:58 AM

## 2021-12-30 ENCOUNTER — OFFICE VISIT (OUTPATIENT)
Dept: PHYSICAL MEDICINE AND REHAB | Facility: CLINIC | Age: 76
End: 2021-12-30
Payer: MEDICARE

## 2021-12-30 VITALS — DIASTOLIC BLOOD PRESSURE: 68 MMHG | OXYGEN SATURATION: 97 % | HEART RATE: 89 BPM | SYSTOLIC BLOOD PRESSURE: 113 MMHG

## 2021-12-30 DIAGNOSIS — M53.3 SACROILIAC JOINT PAIN: Primary | ICD-10-CM

## 2021-12-30 DIAGNOSIS — M47.816 LUMBAR FACET ARTHROPATHY: ICD-10-CM

## 2021-12-30 DIAGNOSIS — M48.061 LUMBAR FORAMINAL STENOSIS: ICD-10-CM

## 2021-12-30 PROCEDURE — 99213 OFFICE O/P EST LOW 20 MIN: CPT | Performed by: PHYSICIAN ASSISTANT

## 2021-12-30 ASSESSMENT — PAIN SCALES - GENERAL: PAINLEVEL: NO PAIN (0)

## 2021-12-30 NOTE — PATIENT INSTRUCTIONS
We talked about a couple of options today:    1.  Different physical therapy, such as pool therapy  2.  Trigger point injections into the muscles in your lower back    Please call if you have any questions or concerns.

## 2021-12-30 NOTE — LETTER
12/30/2021         RE: Shantel Romero  [address]  [place] [address]        Dear Colleague,    Thank you for referring your patient, Shantel Romero, to the [facility] [place]. Please see a copy of my visit note below.    Assessment:   Shantel Romero is a 76 year old y.o. female with past medical history significant for  lung cancer (status post radiation), coronary artery disease, hypertension, osteopenia who presents today for follow-up regarding chronic and worsening right greater than left low back pain with radiation into the right greater than left buttock.  My review of an MRI lumbar spine shows multilevel degenerative change.  At L4-5 there is a right foraminal/far lateral disc protrusion causing severe right foraminal stenosis.    -Patient status post a right L4-5 transforaminal epidural steroid injection December 2, 2021 which did not provide any relief of her pain.  -Patient previously had bilateral sacroiliac joint injections 11/4/2021 which she reported provided 85% relief for 2 to 3 days but after that no relief.  -Within the differential is referred pain from the lower lumbar facets and myofascial pain.  She is neurologically intact.       Plan:     A shared decision making plan was used.  The patient's values and choices were respected.  The following represents what was discussed and decided upon by the physician assistant and the patient.      1.  DIAGNOSTIC TESTS: I reviewed the MRI lumbar spine.  No further diagnostic tests were ordered.    2.  PHYSICAL THERAPY: Patient went to 1 session of physical therapy November 30, 2021.  She states she did not go back for any additional physical therapy because she has tried medical therapy before and it was not helpful.  I did offer to refer her to a different type of physical therapy, such as pool therapy, but she declined.    3.  MEDICATIONS:  No changes are made to the patient's medications. She takes  Tylenol and ibuprofen as needed.  She also uses CBD.  Topical pain relievers have not been helpful.    4.  INTERVENTIONS: No interventions were ordered today.  Patient indicated that she is going to focus on other aspects of her health for now.  She feels like her pain has been moving into different locations since her last injection.  I asked her to keep track of her pain so that we can get a better sense of where her pain is coming from.  -If pain persist, we could try trigger point injections under ultrasound guidance as the next step.  She believes she had trigger point injections more than 10 years ago which were helpful for her pain at that time  -If that does not help, I recommend bilateral L3, L4, L5 medial branch blocks as a work-up toward radiofrequency ablation.  However, I would like her to trial additional physical therapy before pursuing this route.      5.  PATIENT EDUCATION: Patient is in agreement the above plan.  All questions were answered.  -Patient has tried a sacroiliac joint belt and it does not stay in place.    6.  FOLLOW-UP: Patient is going to follow-up as needed.  If she has questions or concerns, she should not hesitate to call.    Subjective:     Shantel Romero is a 76 year old female who presents today for follow-up regarding low back pain with radiation into the bilateral buttocks.  Patient is status post a right L4-5 transforaminal epidural steroid injection December 7, 2021.  Patient reports no improvement in her pain.    Patient complains of right greater than left low back pain.  Patient is somewhat of a vague historian.  She states that since the injection she has felt some pain in different locations in the lower back.  She points to an area higher up on the right side where she thinks she has been experiencing pain, although she does not have pain there today.  Pain is completely dependent on her activity.  She states that when she is doing housework, especially vacuuming,  her pain can be severe.  At other times the pain goes away completely.  She states that she has to stop doing the aggravating activity in order to get relief.    Patient went to 1 session of physical therapy November 30, 2020.  She states she did not want to go to any additional physical therapy because she tried physical therapy several years ago in [place] and it was not helpful.  The only thing that was helpful when she went to physical therapy was applying heat.  She uses CBD which is somewhat helpful.    Review of Systems:  Positive for weakness.  Negative for numbness/tingling, loss of bowel/bladder control, inability to urinate, headache, pain much worse at night, trip/stumble/falls, difficulty swallowing, difficulty with hand skills, fevers, unintentional weight loss.     Objective:   CONSTITUTIONAL:  Vital signs as above.  No acute distress.  The patient is well nourished and well groomed.    PSYCHIATRIC:  The patient is awake, alert, oriented to person, place and time.  The patient is answering questions appropriately with clear speech.  Normal affect.  HEENT: Normocephalic, atraumatic.  Sclera clear.    SKIN:  Skin over the face, posterior torso, bilateral upper and lower extremities is clean, dry, intact without rashes.  VASCULAR: No significant lower extremity edema.  MUSCULOSKELETAL:  Gait is non-antalgic.  The patient is able to rise onto toes and heels bilaterally with support.  No significant tenderness to palpation bilateral lower lumbar paraspinous muscles.  Moderate tenderness palpation bilateral sacroiliac joints.  The patient has 5/5 strength for the bilateral hip flexors, knee flexors/extensors, ankle dorsiflexors/plantar flexors, ankle evertors/invertors.    NEUROLOGICAL: 2+ patellar reflexes which are symmetric bilaterally.  Sensation to light touch intact bilateral lower extremities throughout.     RESULTS: I reviewed the MRI lumbar spine from [facility] dated 10/25/2021.  This shows  progression of multilevel degenerative changes since August 2017.  No evidence for metastatic disease.  At L2-3 there is severe disc degeneration and a asymmetric left disc bulge with mild spinal canal stenosis and mild to moderate bilateral foraminal stenosis worse on the left.  At L3-4 there is a disc bulge with mild spinal canal stenosis and moderate bilateral foraminal stenosis.  At L4-5 there is a generalized disc bulge with superimposed right foraminal/far lateral disc extrusion.  There is severe right foraminal stenosis.  At L5-S1 there is a disc bulge and mild facet arthropathy with mild to moderate bilateral foraminal stenosis.          Again, thank you for allowing me to participate in the care of your patient.        Sincerely,        Marbella Hernández PA-C

## 2022-02-04 ENCOUNTER — OFFICE VISIT (OUTPATIENT)
Dept: CARDIOLOGY | Facility: CLINIC | Age: 77
End: 2022-02-04
Payer: MEDICARE

## 2022-02-04 VITALS
HEART RATE: 88 BPM | BODY MASS INDEX: 24.98 KG/M2 | DIASTOLIC BLOOD PRESSURE: 68 MMHG | WEIGHT: 155.4 LBS | SYSTOLIC BLOOD PRESSURE: 110 MMHG | RESPIRATION RATE: 24 BRPM | HEIGHT: 66 IN

## 2022-02-04 DIAGNOSIS — I25.10 CORONARY ARTERY DISEASE DUE TO LIPID RICH PLAQUE: Primary | ICD-10-CM

## 2022-02-04 DIAGNOSIS — I25.83 CORONARY ARTERY DISEASE DUE TO LIPID RICH PLAQUE: Primary | ICD-10-CM

## 2022-02-04 PROCEDURE — 83721 ASSAY OF BLOOD LIPOPROTEIN: CPT | Performed by: INTERNAL MEDICINE

## 2022-02-04 PROCEDURE — 36415 COLL VENOUS BLD VENIPUNCTURE: CPT | Performed by: INTERNAL MEDICINE

## 2022-02-04 PROCEDURE — 99214 OFFICE O/P EST MOD 30 MIN: CPT | Performed by: INTERNAL MEDICINE

## 2022-02-04 ASSESSMENT — MIFFLIN-ST. JEOR: SCORE: 1206.64

## 2022-02-04 NOTE — PATIENT INSTRUCTIONS
Please increase the nitroglycerin patch to 2 patches or 0.2mg each morning for 12 hours.Please watch for dizziness or headache and go back to 1 patch and let us know.Please me update me in a week works as far as improving the pressure sensation in your upper abdomen.My nurse is Chica and her nu,karen is [phone]

## 2022-02-04 NOTE — LETTER
2/4/2022    LUCRETIA ROSARIO MD  [facility] [address]  [place] [facility] [address]    RE: Shantel E Nick       Dear Colleague,     I had the pleasure of seeing Shantel RAMSAY Nick in the ealth [facility].    HEART CARE ENCOUNTER CONSULTATON NOTE      [facility]  [phone]      Assessment/Recommendations   Assessment/Plan:  1.  Exertional shortness of breath and epigastric discomfort.  Question anginal equivalent with some improvement with nitroglycerin patch.  He is at very low dose of 0.1 mg.  Question if related to underlying process as well.  CT angiogram has been read previously has nonobstructive disease with elevated coronary calcium score.  We talked about whether we would consider conventional coronary angiography given recurrent symptoms but at this point she wanted to try adjusting medications as the initial approach.  She will increase the nitroglycerin patch from 0.1 to 0.2 mg over the next week monitoring for dizziness and lightheadedness and updating us with whether this has made any difference in her symptoms.  Further recommendations will be based on return phone call.  She is aware to seek more immediate attention if symptoms are worsening or progressive.    2.  COPD.  She does have underlying COPD and would wonder if this is contributing to her exertional symptoms.  She apparently has seen pulmonology in the past specifically Dr. Gonzalez.  We will ask that she talk with her primary about potential pulmonary evaluation by pulmonary.    3.  Dyslipidemia.  Her lipids most recently showed her LDL to be higher than previous at 9 1 compared to previous 70.  Ideally would like to push the LDL less than 70 and will plan to check a direct LDL and make additional comments.    4.  Persistent awareness of lightheadedness.  Blood pressure today is 110/68.  Currently she is on 5 mg daily and did asked that she monitor her blood pressure.    1.  Change nitroglycerin  patch to 0.2 mg and asked her to update us with how she tolerates this change and if it made any difference in her symptoms  2.  Would ask her primary care physician to consider reevaluation of her COPD  3.  Follow-up in 3 to 4 months with additional decisions pending above.       History of Present Illness/Subjective    HPI: Shantel Romero is a 76 year old female who is seen in follow-up.  We last visited July 2021.  She describes some chronic shortness of breath with exertion and epigastric discomfort.  She had a CT coronary angiogram read by my colleague Dr. Chapa that reported no obstructive coronary artery disease with a significantly elevated coronary calcium score.  We did try a low-dose nitroglycerin patch which seemed to improve the epigastric discomfort during our last visit.  In the interim today she states that she is experiencing the symptoms again on a fairly regular basis with exertion.  She has difficulty describing and states that when she exerts herself she becomes breathless and has a discomfort in her epigastrium.  This resolves at rest and does not occur at rest.  The symptom is worse in the cold weather.  We discussed keeping an eye on the scars were reviewed.  She brings in warmth to help with that.  She was reluctant in the past to go on isosorbide and this is why we advised the nitroglycerin patch.  She is open to additional discussions.    Recent Echocardiogram Results:  Contains abnormal data Echocardiogram Complete  Order: [id number]   Status: Final result     Visible to patient: Yes (not seen)     Next appt: 02/04/2022 at 12:50 PM in Cardiology (Shivam Denise MD)     0 Result Notes    Details    Reading Physician Reading Date Result Priority   Shivam Denise MD  [phone] 1/30/2021 Routine   Provider, Historical 1/30/2021      Narrative & Impression    No previous study for comparison.    Normal left ventricular size. Mild left ventricular hypertrophy    Left ventricle ejection  fraction is normal. The calculated left ventricular ejection fraction is 67%.    Possible mild right ventricular enlargement.    Normal right ventricular systolic function.    Mild right atrial enlargement.    Aortic valve sclerosis without evidence for significant aortic stenosis. Mild aortic insufficiency.    Trace pericardial effusion suggested       Order  CTA Angiogram coronary artery [[id number]] (Order [id number])    Exam Information    Exam Date Exam Time Exam Date Exam Time Accession # Performing Department Results    2/1/21 12:00 AM 2/1/21 12:01 PM [id number] [facility] [place]        [id number]       PACS Images     Show images for CTA Angiogram coronary artery    Study Result    Narrative & Impression     The total Agatston calcium score is 507. A calcium score in this range places the individual in the 100th percentile when compared to an age and gender matched control group and implies a very high risk of cardiac events in the next ten years.    Right dominant coronary artery system.    Mild nonobstructive coronary artery disease in the left main, left anterior descending artery and circumflex artery.            Recent Coronary Angiogram Results:    Scan on 3/22/2021 by Historical Provider        [facility]     HOLTER REPORT     Results:    Indication for study: Coronary artery disease    The predominant rhythm throughout the tracing was normal sinus rhythm with an average heart rate of 83 bpm.  The chronotropic response to activities of daily living appears to be normal.  The LA interval was normal.  The QRS duration was normal.  The   QT interval was normal.  The study demonstrated no significant bradycardia/pauses.    The patient demonstrated atrial ectopy which was mildly increased at 1.1%.  Nonsustained ectopic atrial tachycardia was not observed.  Sustained ectopic atrial tachycardia, atrial fibrillation, or other supraventricular tachycardia  was not observed.    The patient demonstrated rare ventricular ectopy.  Complex ventricular ectopy was  not observed.  Sustained ventricular tachycardia was not observed.    The patient did return a diary.  Patient reported symptoms of chest pain and dizziness on 2 occasions.  All 3 recordings demonstrated sinus rhythm after sinus tachycardia with heart rates ranging between 89 and 104 bpm.  Each of the recordings   demonstrated a single isolated PVC with no other ectopy or pathologic rhythm disturbance.     Impression:    Borderline Holter monitor with evidence of mildly increased atrial ectopy.  There was no sustained atrial arrhythmia.    Indication for study: Coronary artery disease.  Patient's symptoms of chest pain corresponded to mild sinus tachycardia with a isolated PVC.    Dizziness corresponded to sinus rhythm and a mild sinus tachycardia.  It is the reader's impression that the symptoms are out of proportion to the degree of ectopy.    No sustained atrial or ventricular tachyarrhythmia.    No profound bradycardia or pauses.        Comment: The recording was for 23 hrs and 59 min.  The recording quality was adequate.     2/4/21  2:21 PM [id number] [facility]      [id number]         PACS Images     Show images for PET Oncology (Eyes to Thighs)    Study Result    Narrative & Impression   EXAM: NM PET CT SKULL TO MID THIGH  LOCATION: [facility]  DATE/TIME: 2/4/2021 2:21 PM     INDICATION: Solitary pulmonary nodule, right upper lobe.  COMPARISON: CTA of the chest abdomen pelvis dated 01/29/2021 and CT of the chest dated 04/23/2019.  TECHNIQUE: Serum glucose level 134 mg/dL. One hour post intravenous administration of 9.1 mCi F-18 FDG, PET imaging was performed from the skull base to the mid thighs utilizing attenuation correction with concurrent axial CT and PET/CT image fusion.   Dose reduction techniques were used.     FINDINGS: Mildly FDG avid spiculated  nodule in the right upper lobe measuring 1.6 x 1.1 cm (max SUV 2.1, previously measured 1.1 x 0.9 cm on CT of the chest dated 04/23/2019) suspicious for primary lung neoplasm of low metabolic activity such as   adenocarcinoma without metastasis. Mild reflux esophagitis. The remaining FDG uptake is physiologic from the skull base to mid thigh.     Mild senescent intracranial changes. Postoperative change of the bilateral lenses. Mild to moderate carotid artery bifurcation calcification. Moderate coronary artery calcium. Right renal cysts. Tubal ligation. Multilevel degenerative changes of the   spine.     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       Order-Level Documents:    There are no order-level documents.    Order  CTA Angiogram coronary artery [[id number]] (Order [id number])    Exam Information    Exam Date Exam Time Exam Date Exam Time Accession # Performing Department Results    2/1/21 12:00 AM 2/1/21 12:01 PM [id number] [facility] CT        [id number]       PACS Images     Show images for CTA Angiogram coronary artery    Study Result    Narrative & Impression     The total Agatston calcium score is 507. A calcium score in this range places the individual in the 100th percentile when compared to an age and gender matched control group and implies a very high risk of cardiac events in the next ten years.    Right dominant coronary artery system.    Mild nonobstructive coronary artery disease in the left main, left anterior descending artery and circumflex artery.               Physical Examination  Review of Systems   Vitals: 110/68, weight 155 pound heart rate 90 and regular  Wt Readings from Last 3 Encounters:   11/19/21 70.3 kg (155 lb)   10/15/21 70.6 kg (155 lb 9.6 oz)   10/11/21 70.4 kg (155 lb 3.2 oz)       General Appearance:   no distress, normal body habitus   ENT/Mouth: membranes moist, no oral lesions or  bleeding gums.      EYES:  no scleral icterus, normal conjunctivae   Neck: no carotid bruits or thyromegaly   Chest/Lungs:   lungs are decreased to auscultation, no rales or wheezing, , equal chest wall expansion    Cardiovascular:    Distant, regular. Normal first and second heart sounds with no murmurs, rubs, or gallops; the carotid, radial and posterior tibial pulses are intact, Jugular venous pressure within normal limits, mild edema bilaterally    Abdomen:  no organomegaly, masses, bruits, or tenderness; bowel sounds are present   Extremities: no cyanosis or clubbing   Skin: no xanthelasma, warm.    Neurologic: normal  bilateral, no tremors     Psychiatric: alert and oriented x3, calm        Please refer above for cardiac ROS details.        Medical History  Surgical History Family History Social History   Past Medical History:   Diagnosis Date     COPD (chronic obstructive pulmonary disease) (H)      Coronary artery disease due to lipid rich plaque 02/2021    Abnormal coronary CTA     Family history of myocardial infarction     mother under age 60's  CVA and MI     High cholesterol      Hypertension      PVC's (premature ventricular contractions)      Syncope 2012     Tubular adenoma of colon      Past Surgical History:   Procedure Laterality Date     APPENDECTOMY       SHOULDER SURGERY Right      TUBAL LIGATION       Family History   Problem Relation Age of Onset     Lung Cancer Father         lung     Breast Cancer Paternal Aunt      Cerebrovascular Disease Mother      Coronary Artery Disease Mother         Social History     Socioeconomic History     Marital status:      Spouse name: Not on file     Number of children: Not on file     Years of education: Not on file     Highest education level: Not on file   Occupational History     Not on file   Tobacco Use     Smoking status: Current Every Day Smoker     Smokeless tobacco: Never Used   Substance and Sexual Activity     Alcohol use: Not on file      Drug use: Not on file     Sexual activity: Not on file   Other Topics Concern     Parent/sibling w/ CABG, MI or angioplasty before 65F 55M? Not Asked   Social History Narrative     Not on file     Social Determinants of Health     Financial Resource Strain: Not on file   Food Insecurity: Not on file   Transportation Needs: Not on file   Physical Activity: Not on file   Stress: Not on file   Social Connections: Not on file   Intimate Partner Violence: Not on file   Housing Stability: Not on file           Medications  Allergies   Current Outpatient Medications   Medication Sig Dispense Refill     acetaminophen (TYLENOL) 500 MG tablet Take 500-1,000 mg by mouth every 8 hours as needed        albuterol (PROAIR HFA/PROVENTIL HFA/VENTOLIN HFA) 108 (90 Base) MCG/ACT inhaler Inhale 2 puffs into the lungs       aspirin (ASA) 81 MG EC tablet Take 81 mg by mouth daily       atorvastatin (LIPITOR) 40 MG tablet Take 40 mg by mouth At Bedtime       Cholecalciferol 250 MCG (46430 UT) TABS Take 10,000 Units by mouth daily       fluticasone (FLONASE) 50 MCG/ACT nasal spray Spray 2 sprays in nostril daily       IBANdronate (BONIVA) 150 MG tablet Take 150 mg by mouth every 30 days       lisinopril (ZESTRIL) 10 MG tablet Take 10 mg by mouth daily (Patient not taking: Reported on 12/2/2021)       lisinopril (ZESTRIL) 5 MG tablet Take 5 mg by mouth daily       Multiple Vitamin (MULTIVITAMIN ADULT PO) Take 1 tablet by mouth       multivitamin w/minerals (MULTI-VITAMIN) tablet Take 1 tablet by mouth       nicotine (NICORETTE) 2 MG gum Take 2 mg by mouth       nitroGLYcerin (NITRODUR) 0.1 MG/HR 24 hr patch Place 1 patch onto the skin daily 90 patch 3     Omega-3 1000 MG capsule Take 1 g by mouth daily       predniSONE (DELTASONE) 10 MG tablet  (Patient not taking: Reported on 11/19/2021)       tiotropium (SPIRIVA) 18 MCG inhaled capsule Inhale 18 mcg into the lungs daily       UNABLE TO FIND Take 1 tablet by mouth       No Known  Allergies       Lab Results    Chemistry/lipid CBC Cardiac Enzymes/BNP/TSH/INR   Recent Labs   Lab Test 10/18/21  1329   CHOL 188   HDL 84   LDL 91   TRIG 67     Recent Labs   Lab Test 10/18/21  1329 03/19/21  1309 10/09/20  1415   LDL 91 70 85     Recent Labs   Lab Test 10/18/21  1329      POTASSIUM 4.4   CHLORIDE 103   CO2 26      BUN 13   CR 0.78   GFRESTIMATED 74   TRACE 10.9*     Recent Labs   Lab Test 10/18/21  1329 10/07/21  1512 06/07/21  1309   CR 0.78 0.7 0.72     No results for input(s): A1C in the last 75947 hours.       Recent Labs   Lab Test 06/07/21  1309   WBC 5.9   HGB 13.3   HCT 40.7   MCV 99        Recent Labs   Lab Test 06/07/21  1309 01/29/21  1307 04/06/20  1414   HGB 13.3 14.1 13.6    Recent Labs   Lab Test 01/30/21  0602 01/30/21  0023 01/29/21  1845   TROPONINI <0.01 <0.01 0.01     No results for input(s): BNP, NTBNPI, NTBNP in the last 14587 hours.  Recent Labs   Lab Test 10/07/19  1602   TSH 1.55     Recent Labs   Lab Test 01/29/21  1307   INR 1.00          Shivam Denise MD  [facility]

## 2022-02-04 NOTE — PROGRESS NOTES
HEART CARE ENCOUNTER CONSULTATON NOTE      [facility]  [phone]      Assessment/Recommendations   Assessment/Plan:  1.  Exertional shortness of breath and epigastric discomfort.  Question anginal equivalent with some improvement with nitroglycerin patch.  Sge is at very low dose of 0.1 mg.  Question if related to underlying pulmonary process as well.  CT angiogram has been read previously has nonobstructive disease with elevated coronary calcium score.  We talked about whether we would consider conventional coronary angiography given recurrent symptoms but at this point she wanted to try adjusting medications as the initial approach.  She will increase the nitroglycerin patch from 0.1 to 0.2 mg over the next week monitoring for dizziness and lightheadedness and updating us with whether this has made any difference in her symptoms.  Further recommendations will be based on return phone call.  She is aware to seek more immediate attention if symptoms are worsening or progressive.    2.  COPD.  She does have underlying COPD and would wonder if this is contributing to her exertional symptoms.  She apparently has seen pulmonology in the past specifically Dr. Gonzalez.  We will ask that she talk with her primary about potential pulmonary evaluation by pulmonary.    3.  Dyslipidemia.  Her lipids most recently showed her LDL to be higher than previous at 9 1 compared to previous 70.  Ideally would like to push the LDL less than 70 and will plan to check a direct LDL and make additional comments.    4.  Persistent awareness of lightheadedness.  Blood pressure today is 110/68.  Currently she is on 5 mg daily and did asked that she monitor her blood pressure.    1.  Change nitroglycerin patch to 0.2 mg and asked her to update us with how she tolerates this change and if it made any difference in her symptoms  2.  Would ask her primary care physician to consider reevaluation of her COPD  3.  Follow-up in  3 to 4 months with additional decisions pending above.       History of Present Illness/Subjective    HPI: Shantel Romero is a 76 year old female who is seen in follow-up.  We last visited July 2021.  She describes some chronic shortness of breath with exertion and epigastric discomfort.  She had a CT coronary angiogram read by my colleague Dr. Chapa that reported no obstructive coronary artery disease with a significantly elevated coronary calcium score.  We did try a low-dose nitroglycerin patch which seemed to improve the epigastric discomfort during our last visit.  In the interim today she states that she is experiencing the symptoms again on a fairly regular basis with exertion.  She has difficulty describing and states that when she exerts herself she becomes breathless and has a discomfort in her epigastrium.  This resolves at rest and does not occur at rest.  The symptom is worse in the cold weather.  We discussed keeping an eye on the scars were reviewed.  She brings in warmth to help with that.  She was reluctant in the past to go on isosorbide and this is why we advised the nitroglycerin patch.  She is open to additional discussions.    Recent Echocardiogram Results:  Contains abnormal data Echocardiogram Complete  Order: [id number]   Status: Final result     Visible to patient: Yes (not seen)     Next appt: 02/04/2022 at 12:50 PM in Cardiology (Shivam Denise MD)     0 Result Notes    Details    Reading Physician Reading Date Result Priority   Shivam Denise MD  [phone] 1/30/2021 Routine   Provider, Historical 1/30/2021      Narrative & Impression    No previous study for comparison.    Normal left ventricular size. Mild left ventricular hypertrophy    Left ventricle ejection fraction is normal. The calculated left ventricular ejection fraction is 67%.    Possible mild right ventricular enlargement.    Normal right ventricular systolic function.    Mild right atrial enlargement.    Aortic  valve sclerosis without evidence for significant aortic stenosis. Mild aortic insufficiency.    Trace pericardial effusion suggested       Order  CTA Angiogram coronary artery [[id number]] (Order [id number])    Exam Information    Exam Date Exam Time Exam Date Exam Time Accession # Performing Department Results    2/1/21 12:00 AM 2/1/21 12:01 PM [id number] [facility] [place]        [id number]       PACS Images     Show images for CTA Angiogram coronary artery    Study Result    Narrative & Impression     The total Agatston calcium score is 507. A calcium score in this range places the individual in the 100th percentile when compared to an age and gender matched control group and implies a very high risk of cardiac events in the next ten years.    Right dominant coronary artery system.    Mild nonobstructive coronary artery disease in the left main, left anterior descending artery and circumflex artery.            Recent Coronary Angiogram Results:    Scan on 3/22/2021 by Historical Provider        [facility]     HOLTER REPORT     Results:    Indication for study: Coronary artery disease    The predominant rhythm throughout the tracing was normal sinus rhythm with an average heart rate of 83 bpm.  The chronotropic response to activities of daily living appears to be normal.  The [place] interval was normal.  The QRS duration was normal.  The   QT interval was normal.  The study demonstrated no significant bradycardia/pauses.    The patient demonstrated atrial ectopy which was mildly increased at 1.1%.  Nonsustained ectopic atrial tachycardia was not observed.  Sustained ectopic atrial tachycardia, atrial fibrillation, or other supraventricular tachycardia was not observed.    The patient demonstrated rare ventricular ectopy.  Complex ventricular ectopy was  not observed.  Sustained ventricular tachycardia was not observed.    The patient did return a diary.  Patient  reported symptoms of chest pain and dizziness on 2 occasions.  All 3 recordings demonstrated sinus rhythm after sinus tachycardia with heart rates ranging between 89 and 104 bpm.  Each of the recordings   demonstrated a single isolated PVC with no other ectopy or pathologic rhythm disturbance.     Impression:    Borderline Holter monitor with evidence of mildly increased atrial ectopy.  There was no sustained atrial arrhythmia.    Indication for study: Coronary artery disease.  Patient's symptoms of chest pain corresponded to mild sinus tachycardia with a isolated PVC.    Dizziness corresponded to sinus rhythm and a mild sinus tachycardia.  It is the reader's impression that the symptoms are out of proportion to the degree of ectopy.    No sustained atrial or ventricular tachyarrhythmia.    No profound bradycardia or pauses.        Comment: The recording was for 23 hrs and 59 min.  The recording quality was adequate.     2/4/21  2:21 PM [id number] [facility]      [id number]         PACS Images     Show images for PET Oncology (Eyes to Thighs)    Study Result    Narrative & Impression   EXAM: NM PET CT SKULL TO MID THIGH  LOCATION: [facility]  DATE/TIME: 2/4/2021 2:21 PM     INDICATION: Solitary pulmonary nodule, right upper lobe.  COMPARISON: CTA of the chest abdomen pelvis dated 01/29/2021 and CT of the chest dated 04/23/2019.  TECHNIQUE: Serum glucose level 134 mg/dL. One hour post intravenous administration of 9.1 mCi F-18 FDG, PET imaging was performed from the skull base to the mid thighs utilizing attenuation correction with concurrent axial CT and PET/CT image fusion.   Dose reduction techniques were used.     FINDINGS: Mildly FDG avid spiculated nodule in the right upper lobe measuring 1.6 x 1.1 cm (max SUV 2.1, previously measured 1.1 x 0.9 cm on CT of the chest dated 04/23/2019) suspicious for primary lung neoplasm of low metabolic activity such as    adenocarcinoma without metastasis. Mild reflux esophagitis. The remaining FDG uptake is physiologic from the skull base to mid thigh.     Mild senescent intracranial changes. Postoperative change of the bilateral lenses. Mild to moderate carotid artery bifurcation calcification. Moderate coronary artery calcium. Right renal cysts. Tubal ligation. Multilevel degenerative changes of the   spine.     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       Order-Level Documents:    There are no order-level documents.    Order  CTA Angiogram coronary artery [[id number]] (Order [id number])    Exam Information    Exam Date Exam Time Exam Date Exam Time Accession # Performing Department Results    2/1/21 12:00 AM 2/1/21 12:01 PM [id number] [facility] [place]        [id number]       PACS Images     Show images for CTA Angiogram coronary artery    Study Result    Narrative & Impression     The total Agatston calcium score is 507. A calcium score in this range places the individual in the 100th percentile when compared to an age and gender matched control group and implies a very high risk of cardiac events in the next ten years.    Right dominant coronary artery system.    Mild nonobstructive coronary artery disease in the left main, left anterior descending artery and circumflex artery.               Physical Examination  Review of Systems   Vitals: 110/68, weight 155 pound heart rate 90 and regular  Wt Readings from Last 3 Encounters:   11/19/21 70.3 kg (155 lb)   10/15/21 70.6 kg (155 lb 9.6 oz)   10/11/21 70.4 kg (155 lb 3.2 oz)       General Appearance:   no distress, normal body habitus   ENT/Mouth: membranes moist, no oral lesions or bleeding gums.      EYES:  no scleral icterus, normal conjunctivae   Neck: no carotid bruits or thyromegaly   Chest/Lungs:   lungs are decreased to auscultation, no rales or wheezing, , equal chest wall expansion     Cardiovascular:    Distant, regular. Normal first and second heart sounds with no murmurs, rubs, or gallops; the carotid, radial and posterior tibial pulses are intact, Jugular venous pressure within normal limits, mild edema bilaterally    Abdomen:  no organomegaly, masses, bruits, or tenderness; bowel sounds are present   Extremities: no cyanosis or clubbing   Skin: no xanthelasma, warm.    Neurologic: normal  bilateral, no tremors     Psychiatric: alert and oriented x3, calm        Please refer above for cardiac ROS details.        Medical History  Surgical History Family History Social History   Past Medical History:   Diagnosis Date     COPD (chronic obstructive pulmonary disease) (H)      Coronary artery disease due to lipid rich plaque 02/2021    Abnormal coronary CTA     Family history of myocardial infarction     mother under age 60's  CVA and MI     High cholesterol      Hypertension      PVC's (premature ventricular contractions)      Syncope 2012     Tubular adenoma of colon      Past Surgical History:   Procedure Laterality Date     APPENDECTOMY       SHOULDER SURGERY Right      TUBAL LIGATION       Family History   Problem Relation Age of Onset     Lung Cancer Father         lung     Breast Cancer Paternal Aunt      Cerebrovascular Disease Mother      Coronary Artery Disease Mother         Social History     Socioeconomic History     Marital status:      Spouse name: Not on file     Number of children: Not on file     Years of education: Not on file     Highest education level: Not on file   Occupational History     Not on file   Tobacco Use     Smoking status: Current Every Day Smoker     Smokeless tobacco: Never Used   Substance and Sexual Activity     Alcohol use: Not on file     Drug use: Not on file     Sexual activity: Not on file   Other Topics Concern     Parent/sibling w/ CABG, MI or angioplasty before 65F 55M? Not Asked   Social History Narrative     Not on file     Social  Determinants of Health     Financial Resource Strain: Not on file   Food Insecurity: Not on file   Transportation Needs: Not on file   Physical Activity: Not on file   Stress: Not on file   Social Connections: Not on file   Intimate Partner Violence: Not on file   Housing Stability: Not on file           Medications  Allergies   Current Outpatient Medications   Medication Sig Dispense Refill     acetaminophen (TYLENOL) 500 MG tablet Take 500-1,000 mg by mouth every 8 hours as needed        albuterol (PROAIR HFA/PROVENTIL HFA/VENTOLIN HFA) 108 (90 Base) MCG/ACT inhaler Inhale 2 puffs into the lungs       aspirin (ASA) 81 MG EC tablet Take 81 mg by mouth daily       atorvastatin (LIPITOR) 40 MG tablet Take 40 mg by mouth At Bedtime       Cholecalciferol 250 MCG (86620 UT) TABS Take 10,000 Units by mouth daily       fluticasone (FLONASE) 50 MCG/ACT nasal spray Spray 2 sprays in nostril daily       IBANdronate (BONIVA) 150 MG tablet Take 150 mg by mouth every 30 days       lisinopril (ZESTRIL) 10 MG tablet Take 10 mg by mouth daily (Patient not taking: Reported on 12/2/2021)       lisinopril (ZESTRIL) 5 MG tablet Take 5 mg by mouth daily       Multiple Vitamin (MULTIVITAMIN ADULT PO) Take 1 tablet by mouth       multivitamin w/minerals (MULTI-VITAMIN) tablet Take 1 tablet by mouth       nicotine (NICORETTE) 2 MG gum Take 2 mg by mouth       nitroGLYcerin (NITRODUR) 0.1 MG/HR 24 hr patch Place 1 patch onto the skin daily 90 patch 3     Omega-3 1000 MG capsule Take 1 g by mouth daily       predniSONE (DELTASONE) 10 MG tablet  (Patient not taking: Reported on 11/19/2021)       tiotropium (SPIRIVA) 18 MCG inhaled capsule Inhale 18 mcg into the lungs daily       UNABLE TO FIND Take 1 tablet by mouth       No Known Allergies       Lab Results    Chemistry/lipid CBC Cardiac Enzymes/BNP/TSH/INR   Recent Labs   Lab Test 10/18/21  1329   CHOL 188   HDL 84   LDL 91   TRIG 67     Recent Labs   Lab Test 10/18/21  1329 03/19/21  1309  10/09/20  1415   LDL 91 70 85     Recent Labs   Lab Test 10/18/21  1329      POTASSIUM 4.4   CHLORIDE 103   CO2 26      BUN 13   CR 0.78   GFRESTIMATED 74   TRACE 10.9*     Recent Labs   Lab Test 10/18/21  1329 10/07/21  1512 06/07/21  1309   CR 0.78 0.7 0.72     No results for input(s): A1C in the last 50151 hours.       Recent Labs   Lab Test 06/07/21  1309   WBC 5.9   HGB 13.3   HCT 40.7   MCV 99        Recent Labs   Lab Test 06/07/21  1309 01/29/21  1307 04/06/20  1414   HGB 13.3 14.1 13.6    Recent Labs   Lab Test 01/30/21  0602 01/30/21  0023 01/29/21  1845   TROPONINI <0.01 <0.01 0.01     No results for input(s): BNP, NTBNPI, NTBNP in the last 58109 hours.  Recent Labs   Lab Test 10/07/19  1602   TSH 1.55     Recent Labs   Lab Test 01/29/21  1307   INR 1.00        Shivam Denise MD

## 2022-02-05 LAB — LDLC SERPL CALC-MCNC: 75 MG/DL

## 2022-02-11 ENCOUNTER — HOSPITAL ENCOUNTER (OUTPATIENT)
Dept: CT IMAGING | Facility: CLINIC | Age: 77
Discharge: HOME | End: 2022-02-11
Attending: INTERNAL MEDICINE | Admitting: INTERNAL MEDICINE
Payer: MEDICARE

## 2022-02-11 DIAGNOSIS — M54.50 CHRONIC MIDLINE LOW BACK PAIN WITHOUT SCIATICA: ICD-10-CM

## 2022-02-11 DIAGNOSIS — G89.29 CHRONIC MIDLINE LOW BACK PAIN WITHOUT SCIATICA: ICD-10-CM

## 2022-02-11 LAB
CREAT BLD-MCNC: 0.7 MG/DL (ref 0.6–1.1)
GFR SERPL CREATININE-BSD FRML MDRD: >60 ML/MIN/1.73M2

## 2022-02-11 PROCEDURE — 82565 ASSAY OF CREATININE: CPT

## 2022-02-11 PROCEDURE — G1004 CDSM NDSC: HCPCS

## 2022-02-11 PROCEDURE — 250N000011 HC RX IP 250 OP 636: Performed by: INTERNAL MEDICINE

## 2022-02-11 RX ORDER — IOPAMIDOL 755 MG/ML
100 INJECTION, SOLUTION INTRAVASCULAR ONCE
Status: COMPLETED | OUTPATIENT
Start: 2022-02-11 | End: 2022-02-11

## 2022-02-11 RX ADMIN — IOPAMIDOL 100 ML: 755 INJECTION, SOLUTION INTRAVENOUS at 12:54

## 2022-02-14 ENCOUNTER — ONCOLOGY VISIT (OUTPATIENT)
Dept: ONCOLOGY | Facility: CLINIC | Age: 77
End: 2022-02-14
Attending: INTERNAL MEDICINE
Payer: MEDICARE

## 2022-02-14 VITALS
SYSTOLIC BLOOD PRESSURE: 131 MMHG | TEMPERATURE: 98.7 F | BODY MASS INDEX: 25.07 KG/M2 | OXYGEN SATURATION: 96 % | DIASTOLIC BLOOD PRESSURE: 76 MMHG | WEIGHT: 155.3 LBS | HEART RATE: 81 BPM | RESPIRATION RATE: 16 BRPM

## 2022-02-14 DIAGNOSIS — C34.11 MALIGNANT NEOPLASM OF UPPER LOBE OF RIGHT LUNG (H): Primary | ICD-10-CM

## 2022-02-14 PROCEDURE — 99214 OFFICE O/P EST MOD 30 MIN: CPT | Performed by: INTERNAL MEDICINE

## 2022-02-14 PROCEDURE — G0463 HOSPITAL OUTPT CLINIC VISIT: HCPCS

## 2022-02-14 ASSESSMENT — PAIN SCALES - GENERAL: PAINLEVEL: NO PAIN (0)

## 2022-02-14 NOTE — PROGRESS NOTES
[facility] Hematology and Oncology Progress Note    Patient: Shantel Romero  MRN: [id number]  Date of Service: Feb 14, 2022        Assessment and Plan:    Cancer Staging  No matching staging information was found for the patient.    1.  Right-sided lung mass: I spent some time with Radha and her family member reviewing her CT scan images.  I also reviewed them personally prior to the visit.  There is some slight changes to the dominant right sided lung mass which was treated with radiation.  The greatest dimension will change is 3 mm which has occurred over the span of 12 months.  I think this most likely represents evolving scar rather than cancer progression.  I told her we could do a PET scan to try to confirm this but I think it would be low yield given the small size of the solid component of this mass.  We decided to repeat imaging in 4 months. We can repeat a PET scan in the future if there is continued enlargement.  We will do the same for the smaller right lower lobe nodule.  Questions were answered    ECOG Performance  1    Diagnosis:    1.  Right upper lobe lung mass: Initially seen in 2019. Has not been biopsied.    Treatment:    Stereotactic radiosurgery: 5400 cGy given in 3 fractions.  Completed July 9, 2021.    Interim History:    Cosme returns today for follow-up visit.  She is here to review the results of the CT scan.  Overall she is feeling okay.  Continues to have dyspnea exertion and easy fatigability.  She is concerned about her CT scan report.  No coughing.  No new pain.    Review of Systems:    As above in the history.     Review of Systems otherwise Negative for:  General: chills, fever or night sweats  Psychological: anxiety or depression  Ophthalmic: blurry vision, double vision or loss of vision, vision change  ENT: epistaxis, oral lesions, hearing changes  Hematological and Lymphatic: bleeding, bruising, jaundice, swollen lymph nodes  Endocrine: hot flashes, unexpected weight  changes  Respiratory: cough, hemoptysis, orthopnea or shortness of breath/LUKE  Cardiovascular: chest pain, edema, palpitations or PND  Gastrointestinal: abdominal pain, blood in stools, change in bowel habits, constipation, diarrhea or nausea/vomiting  Genito-Urinary: change in urinary stream, incontinence, frequency/urgency  Musculoskeletal: joint stiffness, swelling, muscle pain  Neurological: dizziness, headaches, numbness/tingling  Dermatological: lumps and rash    Past History:    Past Medical History:   Diagnosis Date     COPD (chronic obstructive pulmonary disease) (H)      Coronary artery disease due to lipid rich plaque 02/2021    Abnormal coronary CTA     Family history of myocardial infarction     mother under age 60's  CVA and MI     High cholesterol      Hypertension      PVC's (premature ventricular contractions)      Syncope 2012     Tubular adenoma of colon      Physical Exam:    /76 (Patient Position: Sitting)   Pulse 81   Temp 98.7  F (37.1  C) (Oral)   Resp 16   Wt 70.4 kg (155 lb 4.8 oz)   SpO2 96%   BMI 25.07 kg/m      General: patient appears stated age of 76 year old. Nontoxic and in no distress.   HEENT: Head: atraumatic, normocephalic. Sclerae anicteric.  Chest:  Normal respiratory effort  Cardiac:  No edema.   Abdomen: abdomen is non-distended  Extremities: normal tone and muscle bulk.  Skin: no lesions or rash on visible skin. Warm and dry.   CNS: alert and oriented. Grossly non-focal.   Psychiatric: normal mood and affect.     Lab Results:    Recent Results (from the past 168 hour(s))   Creatinine POCT   Result Value Ref Range    Creatinine POCT 0.7 0.6 - 1.1 mg/dL    GFR, ESTIMATED POCT >60 >60 mL/min/1.73m2     Imaging:    CT Chest w Contrast    Result Date: 2/11/2022  EXAM: CT CHEST W CONTRAST LOCATION: [facility] DATE/TIME: 2/11/2022 12:35 PM INDICATION: Right lung cancer follow-up. Chronic midline low back pain without sciatica. COMPARISON:  10/07/2021. Others. TECHNIQUE: CT chest with IV contrast. Multiplanar reformats were obtained. Dose reduction techniques were used. CONTRAST: 100 mL Isovue-370. FINDINGS: LUNGS AND PLEURA: Increasing size and soft tissue of the spiculated right upper lobe nodule. On series 6, image 47, the more posterior aspect of this nodule shows increased size and solid component, measuring 10 mm transverse dimension versus 7 mm compared to January 2021. The overall dimensions of this nodule are 10 x 14 mm versus 9 x 13 mm on the 10/07/2021 exam. No other significant change. Stable right lower lobe peribronchovascular 5 mm nodule (series 6, image 92). Redemonstrated extensive emphysema. MEDIASTINUM/AXILLAE: No adenopathy. CORONARY ARTERY CALCIFICATION: Moderate. UPPER ABDOMEN: Hepatic steatosis. MUSCULOSKELETAL: Bony demineralization and degenerative changes. Slight mid thoracic vertebral body wedging, as before. No focal lesion.     IMPRESSION: 1.  Slightly increased size and soft tissue component of the spiculated right upper lobe nodule since 2021, suggestive of enlarging primary lung neoplasm. 2.  No evidence for metastatic disease. 3.  Degenerative changes spine.       Signed by: Timoteo Barnhart MD

## 2022-02-14 NOTE — PROGRESS NOTES
"Oncology Rooming Note    February 14, 2022 1:00 PM   Shantel Romero is a 76 year old female who presents for:    Chief Complaint   Patient presents with     Lung Cancer     Initial Vitals: /76 (Patient Position: Sitting)   Pulse 81   Temp 98.7  F (37.1  C) (Oral)   Resp 16   Wt 70.4 kg (155 lb 4.8 oz)   SpO2 96%   BMI 25.07 kg/m   Estimated body mass index is 25.07 kg/m  as calculated from the following:    Height as of 2/4/22: 1.676 m (5' 6\").    Weight as of this encounter: 70.4 kg (155 lb 4.8 oz). Body surface area is 1.81 meters squared.  No Pain (0) Comment: Data Unavailable   No LMP recorded.  Allergies reviewed: Yes  Medications reviewed: Yes    Medications: Medication refills not needed today.  Pharmacy name entered into Plan[facility]: [id number] #[id number] - [address]    Clinical concerns SOB exhausted easily. Pressure under rib cage- (saw cardiology) last Monday. . Increase tumor size. Plan?        Malgorzata Calloway, BEATRIZ              "

## 2022-02-14 NOTE — LETTER
"    2/14/2022         RE: Shantel Romero  [address]  [place] [address]        Dear Colleague,    Thank you for referring your patient, Shantel Romero, to the [facility] CANCER [facility]. Please see a copy of my visit note below.    Oncology Rooming Note    February 14, 2022 1:00 PM   Shantel Romero is a 76 year old female who presents for:    Chief Complaint   Patient presents with     Lung Cancer     Initial Vitals: /76 (Patient Position: Sitting)   Pulse 81   Temp 98.7  F (37.1  C) (Oral)   Resp 16   Wt 70.4 kg (155 lb 4.8 oz)   SpO2 96%   BMI 25.07 kg/m   Estimated body mass index is 25.07 kg/m  as calculated from the following:    Height as of 2/4/22: 1.676 m (5' 6\").    Weight as of this encounter: 70.4 kg (155 lb 4.8 oz). Body surface area is 1.81 meters squared.  No Pain (0) Comment: Data Unavailable   No LMP recorded.  Allergies reviewed: Yes  Medications reviewed: Yes    Medications: Medication refills not needed today.  Pharmacy name entered into [employer]: [id number] [place] [facility] [place] [address] [place] [facility] [address]    Clinical concerns SOB exhausted easily. Pressure under rib cage- (saw cardiology) last Monday. . Increase tumor size. Plan?        Malgorzata Calloway LPN                [facility] Hematology and Oncology Progress Note    Patient: Shantel Romero  MRN: [id number]  Date of Service: Feb 14, 2022        Assessment and Plan:    Cancer Staging  No matching staging information was found for the patient.    1.  Right-sided lung mass: I spent some time with Radha and her family member reviewing her CT scan images.  I also reviewed them personally prior to the visit.  There is some slight changes to the dominant right sided lung mass which was treated with radiation.  The greatest dimension will change is 3 mm which has occurred over the span of 12 months.  I think this most likely represents evolving scar rather " than cancer progression.  I told her we could do a PET scan to try to confirm this but I think it would be low yield given the small size of the solid component of this mass.  We decided to repeat imaging in 4 months. We can repeat a PET scan in the future if there is continued enlargement.  We will do the same for the smaller right lower lobe nodule.  Questions were answered    ECOG Performance  1    Diagnosis:    1.  Right upper lobe lung mass: Initially seen in 2019. Has not been biopsied.    Treatment:    Stereotactic radiosurgery: 5400 cGy given in 3 fractions.  Completed July 9, 2021.    Interim History:    Jan returns today for follow-up visit.  She is here to review the results of the CT scan.  Overall she is feeling okay.  Continues to have dyspnea exertion and easy fatigability.  She is concerned about her CT scan report.  No coughing.  No new pain.    Review of Systems:    As above in the history.     Review of Systems otherwise Negative for:  General: chills, fever or night sweats  Psychological: anxiety or depression  Ophthalmic: blurry vision, double vision or loss of vision, vision change  ENT: epistaxis, oral lesions, hearing changes  Hematological and Lymphatic: bleeding, bruising, jaundice, swollen lymph nodes  Endocrine: hot flashes, unexpected weight changes  Respiratory: cough, hemoptysis, orthopnea or shortness of breath/LUKE  Cardiovascular: chest pain, edema, palpitations or PND  Gastrointestinal: abdominal pain, blood in stools, change in bowel habits, constipation, diarrhea or nausea/vomiting  Genito-Urinary: change in urinary stream, incontinence, frequency/urgency  Musculoskeletal: joint stiffness, swelling, muscle pain  Neurological: dizziness, headaches, numbness/tingling  Dermatological: lumps and rash    Past History:    Past Medical History:   Diagnosis Date     COPD (chronic obstructive pulmonary disease) (H)      Coronary artery disease due to lipid rich plaque 02/2021    Abnormal  coronary CTA     Family history of myocardial infarction     mother under age 60's  CVA and MI     High cholesterol      Hypertension      PVC's (premature ventricular contractions)      Syncope 2012     Tubular adenoma of colon      Physical Exam:    /76 (Patient Position: Sitting)   Pulse 81   Temp 98.7  F (37.1  C) (Oral)   Resp 16   Wt 70.4 kg (155 lb 4.8 oz)   SpO2 96%   BMI 25.07 kg/m      General: patient appears stated age of 76 year old. Nontoxic and in no distress.   HEENT: Head: atraumatic, normocephalic. Sclerae anicteric.  Chest:  Normal respiratory effort  Cardiac:  No edema.   Abdomen: abdomen is non-distended  Extremities: normal tone and muscle bulk.  Skin: no lesions or rash on visible skin. Warm and dry.   CNS: alert and oriented. Grossly non-focal.   Psychiatric: normal mood and affect.     Lab Results:    Recent Results (from the past 168 hour(s))   Creatinine POCT   Result Value Ref Range    Creatinine POCT 0.7 0.6 - 1.1 mg/dL    GFR, ESTIMATED POCT >60 >60 mL/min/1.73m2     Imaging:    CT Chest w Contrast    Result Date: 2/11/2022  EXAM: CT CHEST W CONTRAST LOCATION: [facility] DATE/TIME: 2/11/2022 12:35 PM INDICATION: Right lung cancer follow-up. Chronic midline low back pain without sciatica. COMPARISON: 10/07/2021. Others. TECHNIQUE: CT chest with IV contrast. Multiplanar reformats were obtained. Dose reduction techniques were used. CONTRAST: 100 mL Isovue-370. FINDINGS: LUNGS AND PLEURA: Increasing size and soft tissue of the spiculated right upper lobe nodule. On series 6, image 47, the more posterior aspect of this nodule shows increased size and solid component, measuring 10 mm transverse dimension versus 7 mm compared to January 2021. The overall dimensions of this nodule are 10 x 14 mm versus 9 x 13 mm on the 10/07/2021 exam. No other significant change. Stable right lower lobe peribronchovascular 5 mm nodule (series 6, image 92). Redemonstrated  extensive emphysema. MEDIASTINUM/AXILLAE: No adenopathy. CORONARY ARTERY CALCIFICATION: Moderate. UPPER ABDOMEN: Hepatic steatosis. MUSCULOSKELETAL: Bony demineralization and degenerative changes. Slight mid thoracic vertebral body wedging, as before. No focal lesion.     IMPRESSION: 1.  Slightly increased size and soft tissue component of the spiculated right upper lobe nodule since 2021, suggestive of enlarging primary lung neoplasm. 2.  No evidence for metastatic disease. 3.  Degenerative changes spine.       Signed by: Timoteo Barnhart MD        Again, thank you for allowing me to participate in the care of your patient.        Sincerely,        Timoteo Barnhart MD

## 2022-02-15 ENCOUNTER — TELEPHONE (OUTPATIENT)
Dept: CARDIOLOGY | Facility: CLINIC | Age: 77
End: 2022-02-15
Payer: MEDICARE

## 2022-02-15 DIAGNOSIS — I25.10 CORONARY ARTERY DISEASE DUE TO LIPID RICH PLAQUE: ICD-10-CM

## 2022-02-15 DIAGNOSIS — R07.9 CHEST PAIN: Primary | ICD-10-CM

## 2022-02-15 DIAGNOSIS — I25.83 CORONARY ARTERY DISEASE DUE TO LIPID RICH PLAQUE: ICD-10-CM

## 2022-02-15 RX ORDER — NITROGLYCERIN 40 MG/1
1 PATCH TRANSDERMAL DAILY
Qty: 90 PATCH | Refills: 3 | Status: SHIPPED | OUTPATIENT
Start: 2022-02-15 | End: 2023-01-30

## 2022-02-15 NOTE — TELEPHONE ENCOUNTER
Yes lets give her a new prescription for 0.2 mg patches.  Did you get a sense of how much better because we can certainly increase the dose further    Shivam Denise MD

## 2022-02-15 NOTE — TELEPHONE ENCOUNTER
"Return call to patient - informed her of Dr. Denise's response/recommendations - patient stated \"she does not have a sense of how much better her sx are\", agreed to continue to monitor sx for signs of improvement and call back if sx persist so dose can be adjusted.    Update forwarded to Dr. Denise.  mg  "

## 2022-02-15 NOTE — TELEPHONE ENCOUNTER
"----- Message from Rosy Dexter sent at 2/15/2022  2:10 PM CST -----  Regarding: MDG  General phone call:    Caller: PT  Primary cardiologist: MDG  Detailed reason for call: PT WOULD LIKE TO SPEAK WITH YOU REGARDING NITROPRUSSIDE\",\"NITROGLYCERIN\"] PATCHES   Best phone number: [phone]  Best time to contact: ANYTIME  Ok to leave a detailedmessage? YES  Device? NO    Additional Info:          THANKS   ROSY [phone]    "

## 2022-02-15 NOTE — TELEPHONE ENCOUNTER
Okay lets send in a prescription for 0.2 mg daily patch and asked her to update us.  Thank you    Shivam Denise MD

## 2022-02-15 NOTE — TELEPHONE ENCOUNTER
"Per Dr. Denise's 2-4-22 visit note:  \"Change nitroglycerin patch to 0.2 mg and asked her to update us with how she tolerates this change and if it made any difference in her symptoms.\"    Return call to patient who confirmed she had increased NTG dose to 2 (0.1mg) patches/daily and has noted improvement in her sx - patient stated chest pressure has decreased and occurs less frequently - patient denied any new sx of HA, dizziness and reported that she would need a new Rx for higher dose if Dr. Denise recommends she continue at 0.2mg - follow-up pending 8/2022.    Informed patient that update would be forwarded to Dr. Denise for recommendations - understanding verbalized.    Please review patient sx update - any new orders?  Should patient continue with recommended dose?  mg  "

## 2022-03-03 ENCOUNTER — MYC MEDICAL ADVICE (OUTPATIENT)
Dept: CARDIOLOGY | Facility: CLINIC | Age: 77
End: 2022-03-03
Payer: MEDICARE

## 2022-06-10 ENCOUNTER — HOSPITAL ENCOUNTER (OUTPATIENT)
Dept: CT IMAGING | Facility: CLINIC | Age: 77
Setting detail: RADIATION/ONCOLOGY SERIES
Discharge: HOME | End: 2022-06-10
Attending: INTERNAL MEDICINE
Payer: MEDICARE

## 2022-06-10 DIAGNOSIS — C34.11 MALIGNANT NEOPLASM OF UPPER LOBE OF RIGHT LUNG (H): ICD-10-CM

## 2022-06-10 LAB
CREAT BLD-MCNC: 0.7 MG/DL (ref 0.6–1.1)
GFR SERPL CREATININE-BSD FRML MDRD: >60 ML/MIN/1.73M2

## 2022-06-10 PROCEDURE — 74177 CT ABD & PELVIS W/CONTRAST: CPT

## 2022-06-10 PROCEDURE — 250N000011 HC RX IP 250 OP 636: Performed by: INTERNAL MEDICINE

## 2022-06-10 PROCEDURE — 82565 ASSAY OF CREATININE: CPT

## 2022-06-10 RX ORDER — IOPAMIDOL 755 MG/ML
100 INJECTION, SOLUTION INTRAVASCULAR ONCE
Status: COMPLETED | OUTPATIENT
Start: 2022-06-10 | End: 2022-06-10

## 2022-06-10 RX ADMIN — IOPAMIDOL 100 ML: 755 INJECTION, SOLUTION INTRAVENOUS at 13:11

## 2022-06-13 ENCOUNTER — LAB (OUTPATIENT)
Dept: INFUSION THERAPY | Facility: CLINIC | Age: 77
End: 2022-06-13
Attending: INTERNAL MEDICINE
Payer: MEDICARE

## 2022-06-13 ENCOUNTER — ONCOLOGY VISIT (OUTPATIENT)
Dept: ONCOLOGY | Facility: CLINIC | Age: 77
End: 2022-06-13
Attending: INTERNAL MEDICINE
Payer: MEDICARE

## 2022-06-13 VITALS
WEIGHT: 153.4 LBS | HEIGHT: 66 IN | BODY MASS INDEX: 24.65 KG/M2 | OXYGEN SATURATION: 96 % | HEART RATE: 82 BPM | SYSTOLIC BLOOD PRESSURE: 126 MMHG | DIASTOLIC BLOOD PRESSURE: 70 MMHG

## 2022-06-13 DIAGNOSIS — J43.1 PANLOBULAR EMPHYSEMA (H): ICD-10-CM

## 2022-06-13 DIAGNOSIS — C34.11 MALIGNANT NEOPLASM OF UPPER LOBE OF RIGHT LUNG (H): ICD-10-CM

## 2022-06-13 DIAGNOSIS — C34.11 MALIGNANT NEOPLASM OF UPPER LOBE OF RIGHT LUNG (H): Primary | ICD-10-CM

## 2022-06-13 LAB
ALBUMIN SERPL-MCNC: 3.9 G/DL (ref 3.5–5)
ALP SERPL-CCNC: 64 U/L (ref 45–120)
ALT SERPL W P-5'-P-CCNC: 22 U/L (ref 0–45)
ANION GAP SERPL CALCULATED.3IONS-SCNC: 8 MMOL/L (ref 5–18)
AST SERPL W P-5'-P-CCNC: 23 U/L (ref 0–40)
BASOPHILS # BLD AUTO: 0 10E3/UL (ref 0–0.2)
BASOPHILS NFR BLD AUTO: 1 %
BILIRUB SERPL-MCNC: 0.4 MG/DL (ref 0–1)
BUN SERPL-MCNC: 17 MG/DL (ref 8–28)
CALCIUM SERPL-MCNC: 10.9 MG/DL (ref 8.5–10.5)
CHLORIDE BLD-SCNC: 102 MMOL/L (ref 98–107)
CO2 SERPL-SCNC: 27 MMOL/L (ref 22–31)
CREAT SERPL-MCNC: 0.76 MG/DL (ref 0.6–1.1)
EOSINOPHIL # BLD AUTO: 0.1 10E3/UL (ref 0–0.7)
EOSINOPHIL NFR BLD AUTO: 1 %
ERYTHROCYTE [DISTWIDTH] IN BLOOD BY AUTOMATED COUNT: 13.8 % (ref 10–15)
GFR SERPL CREATININE-BSD FRML MDRD: 80 ML/MIN/1.73M2
GLUCOSE BLD-MCNC: 110 MG/DL (ref 70–125)
HCT VFR BLD AUTO: 42 % (ref 35–47)
HGB BLD-MCNC: 13.4 G/DL (ref 11.7–15.7)
IMM GRANULOCYTES # BLD: 0 10E3/UL
IMM GRANULOCYTES NFR BLD: 0 %
LYMPHOCYTES # BLD AUTO: 1.3 10E3/UL (ref 0.8–5.3)
LYMPHOCYTES NFR BLD AUTO: 16 %
MCH RBC QN AUTO: 32.8 PG (ref 26.5–33)
MCHC RBC AUTO-ENTMCNC: 31.9 G/DL (ref 31.5–36.5)
MCV RBC AUTO: 103 FL (ref 78–100)
MONOCYTES # BLD AUTO: 0.6 10E3/UL (ref 0–1.3)
MONOCYTES NFR BLD AUTO: 7 %
NEUTROPHILS # BLD AUTO: 6.4 10E3/UL (ref 1.6–8.3)
NEUTROPHILS NFR BLD AUTO: 75 %
NRBC # BLD AUTO: 0 10E3/UL
NRBC BLD AUTO-RTO: 0 /100
PLATELET # BLD AUTO: 273 10E3/UL (ref 150–450)
POTASSIUM BLD-SCNC: 4.7 MMOL/L (ref 3.5–5)
PROT SERPL-MCNC: 7.2 G/DL (ref 6–8)
RBC # BLD AUTO: 4.09 10E6/UL (ref 3.8–5.2)
SODIUM SERPL-SCNC: 137 MMOL/L (ref 136–145)
WBC # BLD AUTO: 8.5 10E3/UL (ref 4–11)

## 2022-06-13 PROCEDURE — 85025 COMPLETE CBC W/AUTO DIFF WBC: CPT

## 2022-06-13 PROCEDURE — 80053 COMPREHEN METABOLIC PANEL: CPT

## 2022-06-13 PROCEDURE — G0463 HOSPITAL OUTPT CLINIC VISIT: HCPCS

## 2022-06-13 PROCEDURE — 99214 OFFICE O/P EST MOD 30 MIN: CPT | Performed by: INTERNAL MEDICINE

## 2022-06-13 PROCEDURE — 36415 COLL VENOUS BLD VENIPUNCTURE: CPT

## 2022-06-13 RX ORDER — ASPIRIN 81 MG
100 TABLET, DELAYED RELEASE (ENTERIC COATED) ORAL DAILY
COMMUNITY
End: 2023-01-30

## 2022-06-13 RX ORDER — TURMERIC 400 MG
800 CAPSULE ORAL DAILY
COMMUNITY
End: 2023-08-30

## 2022-06-13 ASSESSMENT — PAIN SCALES - GENERAL: PAINLEVEL: NO PAIN (0)

## 2022-06-13 NOTE — PROGRESS NOTES
"Oncology Rooming Note    June 13, 2022 1:57 PM   Shantel Romero is a 76 year old female who presents for:    Chief Complaint   Patient presents with     Oncology Clinic Visit     Malignant neoplasm of upper lobe of right lung     Initial Vitals: /70 (BP Location: Left arm, Patient Position: Sitting, Cuff Size: Adult Regular)   Pulse 82   Ht 1.676 m (5' 6\")   Wt 69.6 kg (153 lb 6.4 oz)   SpO2 96%   BMI 24.76 kg/m   Estimated body mass index is 24.76 kg/m  as calculated from the following:    Height as of this encounter: 1.676 m (5' 6\").    Weight as of this encounter: 69.6 kg (153 lb 6.4 oz). Body surface area is 1.8 meters squared.  No Pain (0) Comment: Data Unavailable   No LMP recorded.  Allergies reviewed: Yes  Medications reviewed: Yes    Medications: Medication refills not needed today.  Pharmacy name entered into [employer]: [id number] - [place] [address] [facility] [address]    Clinical concerns: 4 month follow up with labs      Vandana Andrea MA            "

## 2022-06-13 NOTE — LETTER
"    6/13/2022         RE: Shantel Romero  [address]  [place] [address]        Dear Colleague,    Thank you for referring your patient, Shantel Romero, to the [facility] CANCER [facility]. Please see a copy of my visit note below.    Oncology Rooming Note    June 13, 2022 1:57 PM   Shantel Romero is a 76 year old female who presents for:    Chief Complaint   Patient presents with     Oncology Clinic Visit     Malignant neoplasm of upper lobe of right lung     Initial Vitals: /70 (BP Location: Left arm, Patient Position: Sitting, Cuff Size: Adult Regular)   Pulse 82   Ht 1.676 m (5' 6\")   Wt 69.6 kg (153 lb 6.4 oz)   SpO2 96%   BMI 24.76 kg/m   Estimated body mass index is 24.76 kg/m  as calculated from the following:    Height as of this encounter: 1.676 m (5' 6\").    Weight as of this encounter: 69.6 kg (153 lb 6.4 oz). Body surface area is 1.8 meters squared.  No Pain (0) Comment: Data Unavailable   No LMP recorded.  Allergies reviewed: Yes  Medications reviewed: Yes    Medications: Medication refills not needed today.  Pharmacy name entered into [employer]: [facility] #[id number] [address] AT [facility] [address]    Clinical concerns: 4 month follow up with labs      Vandana Andrea MA              [facility] Hematology and Oncology Progress Note    Patient: Shantel Romero  MRN: [id number]  Date of Service: Jun 13, 2022        Assessment and Plan:    1.  Right-sided lung cancer: She just had a follow-up CT scan.  I reviewed the images personally and shared them with Cosme.  When compared to 4 months ago there is minimal if any change.  Clinically she is stable.  This is most likely residual scarring which is resolving from her radiosurgery.  We will see her back in 4 months with repeat imaging.  We are also following her right lower lobe nodule.  If there seems to be significant progression when compared to February then " we could consider a PET scan.  Questions were answered.  Radiation was completed 1 year ago.  I reviewed her CBC today which shows a normal hemoglobin of 13.4.    2.  COPD: She has emphysema which appears somewhat significant on her CT imaging.  She has not seen a pulmonologist in some time.  She has dyspnea on exertion.  She agrees to see one of our pulmonologist here to optimize her medications.  A consultation was placed.  I do not know how long its been since she has had pulmonary function test.    ECOG Performance  1    Diagnosis:    1.  Right upper lobe lung mass: Initially seen in 2019. Has not been biopsied.    Treatment:    Stereotactic radiosurgery: 5400 cGy given in 3 fractions.  Completed July 9, 2021.    Interim History:    Jan returns today for follow-up visit.  Overall she is doing okay.  No changes in her health over the past month.  No acute complaints today.     Review of Systems:    As above in the history.     Review of Systems otherwise Negative for:  General: chills, fever or night sweats  Psychological: anxiety or depression  Ophthalmic: blurry vision, double vision or loss of vision, vision change  ENT: epistaxis, oral lesions, hearing changes  Hematological and Lymphatic: bleeding, bruising, jaundice, swollen lymph nodes  Endocrine: hot flashes, unexpected weight changes  Respiratory: cough, hemoptysis, orthopnea or shortness of breath/LUKE  Cardiovascular: chest pain, edema, palpitations or PND  Gastrointestinal: abdominal pain, blood in stools, change in bowel habits, constipation, diarrhea or nausea/vomiting  Genito-Urinary: change in urinary stream, incontinence, frequency/urgency  Musculoskeletal: joint stiffness, swelling, muscle pain  Neurological: dizziness, headaches, numbness/tingling  Dermatological: lumps and rash    Past History:    Past Medical History:   Diagnosis Date     COPD (chronic obstructive pulmonary disease) (H)      Coronary artery disease due to lipid rich plaque  "02/2021    Abnormal coronary CTA     Family history of myocardial infarction     mother under age 60's  CVA and MI     High cholesterol      Hypertension      PVC's (premature ventricular contractions)      Syncope 2012     Tubular adenoma of colon      Physical Exam:    /70 (BP Location: Left arm, Patient Position: Sitting, Cuff Size: Adult Regular)   Pulse 82   Ht 1.676 m (5' 6\")   Wt 69.6 kg (153 lb 6.4 oz)   SpO2 96%   BMI 24.76 kg/m      General: patient appears stated age of 76 year old. Nontoxic and in no distress.   HEENT: Head: atraumatic, normocephalic. Sclerae anicteric.  Chest:  Normal respiratory effort  Cardiac:  No edema.   Abdomen: abdomen is non-distended  Extremities: normal tone and muscle bulk.  Skin: no lesions or rash on visible skin. Warm and dry.   CNS: alert and oriented. Grossly non-focal.   Psychiatric: normal mood and affect.     Lab Results:    Recent Results (from the past 168 hour(s))   Creatinine POCT   Result Value Ref Range    Creatinine POCT 0.7 0.6 - 1.1 mg/dL    GFR, ESTIMATED POCT >60 >60 mL/min/1.73m2   Comprehensive metabolic panel   Result Value Ref Range    Sodium 137 136 - 145 mmol/L    Potassium 4.7 3.5 - 5.0 mmol/L    Chloride 102 98 - 107 mmol/L    Carbon Dioxide (CO2) 27 22 - 31 mmol/L    Anion Gap 8 5 - 18 mmol/L    Urea Nitrogen 17 8 - 28 mg/dL    Creatinine 0.76 0.60 - 1.10 mg/dL    Calcium 10.9 (H) 8.5 - 10.5 mg/dL    Glucose 110 70 - 125 mg/dL    Alkaline Phosphatase 64 45 - 120 U/L    AST 23 0 - 40 U/L    ALT 22 0 - 45 U/L    Protein Total 7.2 6.0 - 8.0 g/dL    Albumin 3.9 3.5 - 5.0 g/dL    Bilirubin Total 0.4 0.0 - 1.0 mg/dL    GFR Estimate 80 >60 mL/min/1.73m2   CBC with platelets and differential   Result Value Ref Range    WBC Count 8.5 4.0 - 11.0 10e3/uL    RBC Count 4.09 3.80 - 5.20 10e6/uL    Hemoglobin 13.4 11.7 - 15.7 g/dL    Hematocrit 42.0 35.0 - 47.0 %     (H) 78 - 100 fL    MCH 32.8 26.5 - 33.0 pg    MCHC 31.9 31.5 - 36.5 g/dL    RDW " 13.8 10.0 - 15.0 %    Platelet Count 273 150 - 450 10e3/uL    % Neutrophils 75 %    % Lymphocytes 16 %    % Monocytes 7 %    % Eosinophils 1 %    % Basophils 1 %    % Immature Granulocytes 0 %    NRBCs per 100 WBC 0 <1 /100    Absolute Neutrophils 6.4 1.6 - 8.3 10e3/uL    Absolute Lymphocytes 1.3 0.8 - 5.3 10e3/uL    Absolute Monocytes 0.6 0.0 - 1.3 10e3/uL    Absolute Eosinophils 0.1 0.0 - 0.7 10e3/uL    Absolute Basophils 0.0 0.0 - 0.2 10e3/uL    Absolute Immature Granulocytes 0.0 <=0.4 10e3/uL    Absolute NRBCs 0.0 10e3/uL     Imaging:    CT Chest/Abdomen/Pelvis w Contrast    Result Date: 6/10/2022  EXAM: CT CHEST/ABDOMEN/PELVIS W CONTRAST LOCATION: [facility] DATE/TIME: 6/10/2022 1:02 PM INDICATION:  Malignant neoplasm of upper lobe of right lung (H). Follow-up lung nodules. Post radiation treatment. COMPARISON: 2/11/2022 and 10/7/2021 CTs. PET scan 02/04/2021. TECHNIQUE: CT scan of the chest, abdomen, and pelvis was performed following injection of IV contrast. Multiplanar reformats were obtained. Dose reduction techniques were used. CONTRAST: ISOVUE 370 100 mL FINDINGS: LUNGS AND PLEURA: Overall stable size to the nodule right upper lobe measuring 14 x 10 mm. New surrounding mild infiltrates about the nodule consistent with some mild radiation pneumonitis. No significant other nodule. Stable 4 mm nodule left lower lobe far posteriorly image 182 should be of no significance. In the right lower lobe posteriorly there is a stable 5 mm nodule image 126. No significant new findings. MEDIASTINUM/AXILLAE: No lymphadenopathy. CORONARY ARTERY CALCIFICATION: Moderate. HEPATOBILIARY: Stable small hypervascular liver lesion segment 4 has appearance of a benign hemangioma. Image 56. PANCREAS: Normal. SPLEEN: Normal. ADRENAL GLANDS: Normal. KIDNEYS/BLADDER: No significant finding. BOWEL: Normal. LYMPH NODES: No lymphadenopathy. VASCULATURE: Moderate calcified plaque. No aneurysms. PELVIC ORGANS:  Normal. MUSCULOSKELETAL: No concerning bone lesion.     IMPRESSION: 1.  Stable 14 x 10 mm nodule right upper lobe since 02/11/2022. 2.  New mild radiation pneumonitis around this nodule. 3.  No significant new findings.      Signed by: Timoteo Barnhart MD        Again, thank you for allowing me to participate in the care of your patient.        Sincerely,        Timoteo Barnhart MD

## 2022-06-17 DIAGNOSIS — J43.1 PANLOBULAR EMPHYSEMA (H): Primary | ICD-10-CM

## 2022-07-18 ENCOUNTER — HOSPITAL ENCOUNTER (OUTPATIENT)
Dept: RESPIRATORY THERAPY | Facility: CLINIC | Age: 77
Discharge: HOME | End: 2022-07-18
Admitting: INTERNAL MEDICINE
Payer: MEDICARE

## 2022-07-18 DIAGNOSIS — J43.1 PANLOBULAR EMPHYSEMA (H): ICD-10-CM

## 2022-07-18 LAB
DLCOCOR-%PRED-PRE: 52 %
DLCOCOR-PRE: 10.57 ML/MIN/MMHG
DLCOUNC-%PRED-PRE: 50 %
DLCOUNC-PRE: 10.12 ML/MIN/MMHG
DLCOUNC-PRED: 20.23 ML/MIN/MMHG
ERV-%PRED-PRE: 88 %
ERV-PRE: 0.63 L
ERV-PRED: 0.71 L
EXPTIME-PRE: 7.17 SEC
FEF2575-%PRED-POST: 45 %
FEF2575-%PRED-PRE: 28 %
FEF2575-POST: 0.77 L/SEC
FEF2575-PRE: 0.48 L/SEC
FEF2575-PRED: 1.68 L/SEC
FEFMAX-%PRED-PRE: 57 %
FEFMAX-PRE: 3.03 L/SEC
FEFMAX-PRED: 5.29 L/SEC
FEV1-%PRED-PRE: 53 %
FEV1-PRE: 1.13 L
FEV1FEV6-PRE: 62 %
FEV1FEV6-PRED: 78 %
FEV1FVC-PRE: 60 %
FEV1FVC-PRED: 77 %
FEV1SVC-PRE: 60 %
FEV1SVC-PRED: 67 %
FIFMAX-PRE: 2.31 L/SEC
FRCPLETH-%PRED-PRE: 124 %
FRCPLETH-PRE: 3.52 L
FRCPLETH-PRED: 2.83 L
FVC-%PRED-PRE: 67 %
FVC-PRE: 1.88 L
FVC-PRED: 2.79 L
HGB BLD-MCNC: 12.1 G/DL (ref 11.7–15.7)
IC-%PRED-PRE: 51 %
IC-PRE: 1.25 L
IC-PRED: 2.44 L
RVPLETH-%PRED-PRE: 126 %
RVPLETH-PRE: 2.89 L
RVPLETH-PRED: 2.28 L
TLCPLETH-%PRED-PRE: 90 %
TLCPLETH-PRE: 4.77 L
TLCPLETH-PRED: 5.27 L
VA-%PRED-PRE: 83 %
VA-PRE: 4.2 L
VC-%PRED-PRE: 59 %
VC-PRE: 1.88 L
VC-PRED: 3.15 L

## 2022-07-18 PROCEDURE — 94060 EVALUATION OF WHEEZING: CPT | Mod: 26 | Performed by: INTERNAL MEDICINE

## 2022-07-18 PROCEDURE — 94729 DIFFUSING CAPACITY: CPT | Mod: 26 | Performed by: INTERNAL MEDICINE

## 2022-07-18 PROCEDURE — 94729 DIFFUSING CAPACITY: CPT

## 2022-07-18 PROCEDURE — 94060 EVALUATION OF WHEEZING: CPT

## 2022-07-18 PROCEDURE — 250N000009 HC RX 250: Performed by: INTERNAL MEDICINE

## 2022-07-18 PROCEDURE — 94726 PLETHYSMOGRAPHY LUNG VOLUMES: CPT

## 2022-07-18 PROCEDURE — 999N000157 HC STATISTIC RCP TIME EA 10 MIN

## 2022-07-18 PROCEDURE — 36415 COLL VENOUS BLD VENIPUNCTURE: CPT | Performed by: INTERNAL MEDICINE

## 2022-07-18 PROCEDURE — 85018 HEMOGLOBIN: CPT | Performed by: INTERNAL MEDICINE

## 2022-07-18 PROCEDURE — 94726 PLETHYSMOGRAPHY LUNG VOLUMES: CPT | Mod: 26 | Performed by: INTERNAL MEDICINE

## 2022-07-18 RX ORDER — ALBUTEROL SULFATE 0.83 MG/ML
2.5 SOLUTION RESPIRATORY (INHALATION) ONCE
Status: COMPLETED | OUTPATIENT
Start: 2022-07-18 | End: 2022-07-18

## 2022-07-18 RX ADMIN — ALBUTEROL SULFATE 2.5 MG: 2.5 SOLUTION RESPIRATORY (INHALATION) at 12:24

## 2022-07-18 NOTE — PROGRESS NOTES
RESPIRATORY CARE NOTE    Complete Pulmonary Function Test completed by patient.  Good patient effort and cooperation. Albuterol 2.5 mg neb given for bronchodilation.  The results of this test meet the ATS standards for acceptability and repeatability. PT returned to baseline and left in no distress.    Raquel Ray, RT  7/18/2022

## 2022-07-23 ENCOUNTER — HEALTH MAINTENANCE LETTER (OUTPATIENT)
Age: 77
End: 2022-07-23

## 2022-08-08 ENCOUNTER — OFFICE VISIT (OUTPATIENT)
Dept: PULMONOLOGY | Facility: OTHER | Age: 77
End: 2022-08-08
Attending: INTERNAL MEDICINE
Payer: MEDICARE

## 2022-08-08 VITALS
BODY MASS INDEX: 24.2 KG/M2 | DIASTOLIC BLOOD PRESSURE: 62 MMHG | HEART RATE: 80 BPM | WEIGHT: 150.6 LBS | HEIGHT: 66 IN | OXYGEN SATURATION: 100 % | SYSTOLIC BLOOD PRESSURE: 112 MMHG

## 2022-08-08 DIAGNOSIS — J43.1 PANLOBULAR EMPHYSEMA (H): ICD-10-CM

## 2022-08-08 DIAGNOSIS — J44.9 CHRONIC OBSTRUCTIVE PULMONARY DISEASE, UNSPECIFIED COPD TYPE (H): Primary | ICD-10-CM

## 2022-08-08 PROCEDURE — 99204 OFFICE O/P NEW MOD 45 MIN: CPT | Performed by: INTERNAL MEDICINE

## 2022-08-08 NOTE — PROGRESS NOTES
Pulmonary Medicine Consult   8/8/2022    Assessment and Plan:   #1. Dyspnea on exertion  #2. COPD GOLD B  #3. Emphysema      CONTINUE Spiriva inhaler once daily    START Breo inhaler once daily    Once her 6 months of Spiriva is used we can try a triple therapy inhaler if she finds the addition of the ICS/LABA helpful.     CONTINUE Albuterol inhaler every 4 hours as needed for increased shortness of breath or chest tightness. You can use the spacer with this to help you get the medication into your lungs    Try and increase exercise as you are able     If you are interested in pulmonary rehab let me know and I will order a referral. This program may help with your activity tolerance.    Not interested in pulmonary rehab at this time.      RTC 6-8 weeks.      Patient seen and precepted with JOSH Baig MD  Pulmonary and Critical Care Medicine  [facility]  Office: [phone]      ___________________________________________________________________    CC:   Chief Complaint   Patient presents with     Consult     Emphysema        HPI:   Shantel Romero is a 77 year old female with history of COPD, emphysema, CAD, HLD, HTN, and lung cancer diagnosed in 2019 s/p radiation that finished earlier this year presenting today for evaluation of dyspnea on exertion and emphysema. Taking Spiriva daily for years. Has not tried any other inhalers.    Reports intermittent LUKE that she believes to be related to anxiety. She notes Clonazepam helps the most. She has tried Albuterol with symptoms and it does not seem to help much, although she does not believe she is using it correctly. When demonstrating how she uses the inhaler it is noted that she was not depressing the inhaler and was not in fact getting any medication.   Denies cough, no phlegm. Symptoms do not seem worse with activity but she does note that her activity tolerance is lower. She is able to go shopping and walk around her house if  "she paces herself mostly due to occasional dizziness with activity. Not very active, spends most of the day on the computer.     PND uses Flonase with some relief. Denies sinus pressure or congestion.   Still has intermittent chest pain and chest tightness. Followed by cardiology, uses nitroglycerin with relief.     She was previously seen at [facility] by Dr. Jay Gonzalez. She had PFTs done and was evaluated when she was initially diagnosed with lung cancer in 2019. Those records were not available today.      Patient supplied answers from flow sheet for:  COPD Assessment Test (CAT)  2009 [facility]. All rights reserved.  COPD assessment test (CAT) 8/8/2022   Cough 0   Phlegm 0   Chest tightness 3   Walk up hill 5   Limited activities 5   Leaving my home 0   Sleep 3   Energy 5   Total Score 21        ROS:   10 point ROS negative unless otherwise noted in HPI    Physical Exam:  /62   Pulse 80   Ht 1.676 m (5' 6\")   Wt 68.3 kg (150 lb 9.6 oz)   SpO2 100%   BMI 24.31 kg/m      Physical Exam  Constitutional:       General: She is not in acute distress.     Appearance: She is not ill-appearing or diaphoretic.   HENT:      Nose: Nose normal.   Cardiovascular:      Rate and Rhythm: Normal rate and regular rhythm.      Pulses: Normal pulses.      Heart sounds: Normal heart sounds.   Pulmonary:      Effort: Pulmonary effort is normal. No respiratory distress.      Breath sounds: Normal breath sounds. No wheezing or rhonchi.   Musculoskeletal:      Right lower leg: No edema.      Left lower leg: No edema.   Skin:     General: Skin is warm and dry.   Neurological:      Mental Status: She is alert.   Psychiatric:         Behavior: Behavior normal.         Labs:   personally reviewed in the EMR.    Imaging studies: personally reviewed and interpreted. Below are the Radiology interpretations.      PFT's (7/8/2022): abnormal  FEV1/FVC is 60% and is reduced.  FEV1 is 1.13L (53%) predicted and is reduced  FVC is 1.88L " (67%) predicted and is reduced  There was improvement in spirometry after a single inhaled dose of bronchodilator.  TLC is 4.77L (90%) predicted and is normal.  RV is 2.89L (126%) predicted and is normal.  DLCO is10.57ml/min/hg (52%) predicted and is reduced when it is corrected for hemoglobin.  Flow volume loops indicate scooping.    Impression:  Full Pulmonary Function Test is abnormal.  PFTs are consistent with moderate-severe obstructive disease.  Spirometry is consistent with reversibility.  There is no hyperinflation.  There is no air-trapping.  Diffusion capacity when corrected for hemoglobin is moderately reduced.      Chest CT 6/10/22:  IMPRESSION:  1.  Stable 14 x 10 mm nodule right upper lobe since 02/11/2022.  2.  New mild radiation pneumonitis around this nodule.  3.  No significant new findings.    Echo 1/30/2021:  Narrative & Impression    No previous study for comparison.    Normal left ventricular size. Mild left ventricular hypertrophy    Left ventricle ejection fraction is normal. The calculated left ventricular ejection fraction is 67%.    Possible mild right ventricular enlargement.    Normal right ventricular systolic function.    Mild right atrial enlargement.    Aortic valve sclerosis without evidence for significant aortic stenosis. Mild aortic insufficiency.    Trace pericardial effusion suggested    PMH:  Patient Active Problem List    Diagnosis Date Noted     Malignant neoplasm of upper lobe of right lung (H) 06/16/2021     Priority: Medium     Mass of upper lobe of right lung 02/08/2021     Priority: Medium     Tobacco abuse 02/01/2021     Priority: Medium     Hypertension, unspecified type      Priority: Medium     Dyspnea on exertion      Priority: Medium     Coronary artery disease due to lipid rich plaque      Priority: Medium     Chest pain 01/29/2021     Priority: Medium     Osteopenia      Priority: Medium     Created by Conversion  Replacement Utility updated for latest O  load         Premature Menopause      Priority: Medium     Created by Conversion           PSH:  Past Surgical History:   Procedure Laterality Date     APPENDECTOMY       SHOULDER SURGERY Right      TUBAL LIGATION         Family HX: family history includes Breast Cancer in her paternal aunt; Cerebrovascular Disease in her mother; Coronary Artery Disease in her mother; Lung Cancer in her father.    Social Hx:  Social History     Socioeconomic History     Marital status:      Spouse name: Not on file     Number of children: Not on file     Years of education: Not on file     Highest education level: Not on file   Occupational History     Not on file   Tobacco Use     Smoking status: Former Smoker     Types: Cigarettes     Quit date: 2019     Years since quitting: 3.6     Smokeless tobacco: Never Used   Substance and Sexual Activity     Alcohol use: Not on file     Drug use: Not on file     Sexual activity: Not on file   Other Topics Concern     Parent/sibling w/ CABG, MI or angioplasty before 65F 55M? Not Asked   Social History Narrative     Not on file     Social Determinants of Health     Financial Resource Strain: Not on file   Food Insecurity: Not on file   Transportation Needs: Not on file   Physical Activity: Not on file   Stress: Not on file   Social Connections: Not on file   Intimate Partner Violence: Not on file   Housing Stability: Not on file         Current Meds:  Current Outpatient Medications   Medication Sig Dispense Refill     acetaminophen (TYLENOL) 500 MG tablet Take 500-1,000 mg by mouth every 8 hours as needed        albuterol (PROAIR HFA/PROVENTIL HFA/VENTOLIN HFA) 108 (90 Base) MCG/ACT inhaler Inhale 2 puffs into the lungs       aspirin (ASA) 81 MG EC tablet Take 81 mg by mouth daily       atorvastatin (LIPITOR) 40 MG tablet Take 40 mg by mouth At Bedtime       Cholecalciferol 250 MCG (24780 UT) TABS Take 10,000 Units by mouth daily       docusate sodium (COLACE) 100 MG tablet Take 100  mg by mouth daily       fluticasone (FLONASE) 50 MCG/ACT nasal spray Spray 2 sprays in nostril daily       fluticasone-vilanterol (BREO ELLIPTA) 100-25 MCG/INH inhaler Inhale 1 puff into the lungs daily 60 each 3     HEMP OIL OR EXTRACT OR OTHER CBD CANNABINOID, NOT MEDICAL CANNABIS, Uses prn       IBANdronate (BONIVA) 150 MG tablet Take 150 mg by mouth every 30 days       lisinopril (ZESTRIL) 5 MG tablet Take 5 mg by mouth daily       Multiple Vitamin (MULTIVITAMIN ADULT PO) Take 1 tablet by mouth       nicotine (NICORETTE) 2 MG gum Take 2 mg by mouth       nitroGLYcerin (NITRODUR) 0.2 MG/HR 24 hr patch Place 1 patch onto the skin daily 90 patch 3     Omega-3 1000 MG capsule Take 1 g by mouth daily       Pine Bark, Pycnogenol, (PINE BARK, PINUS PINASTER, OR) Take 150 mg by mouth daily       tiotropium (SPIRIVA) 18 MCG inhaled capsule Inhale 18 mcg into the lungs daily       Turmeric 400 MG CAPS Take 800 mg by mouth daily         Allergies:  No Known Allergies

## 2022-08-08 NOTE — PATIENT INSTRUCTIONS
CONTINUE Spiriva inhaler once daily  START Breo inhaler once daily  CONTINUE Albuterol inhaler every 4 hours as needed for increased shortness of breath or chest tightness. You can use the spacer with this to help you get the medication into your lungs  Try and increase exercise as you are able   If you are interested in pulmonary rehab let me know and I will order a referral. This program may help with your activity tolerance.     Call me if the Breo inhaler is too expensive and we will try a different one.     If you have worsening symptoms, questions, or need to speak with the nurse please call [phone].    Maura Turner, CNP  Pulmonary Medicine  [facility]   [phone]

## 2022-08-08 NOTE — NURSING NOTE
Patient given written prescription for Breo today. Gave demonstration of use of Breo inhaler along with written instructions. Pt able to return demonstrate use of inhaler to this staff. Reminded patient to rinse and spit after use of Breo.

## 2022-08-09 ENCOUNTER — OFFICE VISIT (OUTPATIENT)
Dept: CARDIOLOGY | Facility: CLINIC | Age: 77
End: 2022-08-09
Attending: INTERNAL MEDICINE
Payer: MEDICARE

## 2022-08-09 VITALS
HEART RATE: 62 BPM | DIASTOLIC BLOOD PRESSURE: 66 MMHG | BODY MASS INDEX: 24.71 KG/M2 | WEIGHT: 153.1 LBS | SYSTOLIC BLOOD PRESSURE: 108 MMHG | RESPIRATION RATE: 16 BRPM

## 2022-08-09 DIAGNOSIS — I25.10 CORONARY ARTERY DISEASE DUE TO LIPID RICH PLAQUE: ICD-10-CM

## 2022-08-09 DIAGNOSIS — R07.89 CHEST DISCOMFORT: ICD-10-CM

## 2022-08-09 DIAGNOSIS — I25.83 CORONARY ARTERY DISEASE DUE TO LIPID RICH PLAQUE: ICD-10-CM

## 2022-08-09 DIAGNOSIS — R10.13 DYSPEPSIA: Primary | ICD-10-CM

## 2022-08-09 DIAGNOSIS — J44.9 CHRONIC OBSTRUCTIVE PULMONARY DISEASE, UNSPECIFIED COPD TYPE (H): Primary | ICD-10-CM

## 2022-08-09 LAB — TROPONIN I SERPL-MCNC: <0.01 NG/ML (ref 0–0.29)

## 2022-08-09 PROCEDURE — 84484 ASSAY OF TROPONIN QUANT: CPT | Performed by: INTERNAL MEDICINE

## 2022-08-09 PROCEDURE — 99214 OFFICE O/P EST MOD 30 MIN: CPT | Performed by: INTERNAL MEDICINE

## 2022-08-09 PROCEDURE — 93000 ELECTROCARDIOGRAM COMPLETE: CPT | Performed by: INTERNAL MEDICINE

## 2022-08-09 PROCEDURE — 36415 COLL VENOUS BLD VENIPUNCTURE: CPT | Performed by: INTERNAL MEDICINE

## 2022-08-09 RX ORDER — FAMOTIDINE 20 MG/1
20 TABLET, FILM COATED ORAL 2 TIMES DAILY
Qty: 90 TABLET | Refills: 4 | Status: SHIPPED | OUTPATIENT
Start: 2022-08-09 | End: 2022-08-10

## 2022-08-09 RX ORDER — FLUTICASONE PROPIONATE AND SALMETEROL 250; 50 UG/1; UG/1
1 POWDER RESPIRATORY (INHALATION) 2 TIMES DAILY
Qty: 180 EACH | Refills: 1 | Status: SHIPPED | OUTPATIENT
Start: 2022-08-09 | End: 2023-04-10

## 2022-08-09 RX ORDER — NITROGLYCERIN 0.4 MG/1
TABLET SUBLINGUAL
Qty: 25 TABLET | Refills: 4 | Status: SHIPPED | OUTPATIENT
Start: 2022-08-09

## 2022-08-09 NOTE — PATIENT INSTRUCTIONS
We are going to try pepcd for your upset stomach.Try taking 20mg twice a day and let me know how it is working.We also discussed another primary doctor option Dr Aguila.My nurse is Chica and her number is [phone]  We talked about decreasing the Lisinopril in half to 2.5mg daily instead of 5mg daily.Please monitor your blood pressure on the lower dose and update me by writing on my chart or call my nurse Chica [phone]  Blood pressure goal is less than 140/90

## 2022-08-09 NOTE — LETTER
8/9/2022    LUCRETIA ROSARIO MD  [facility] [address]  [facility] [place] [address]    RE: Shantel RAMSAY Mosesjaspreetphillip       Dear Colleague,     I had the pleasure of seeing Shantel Romero in the [facility].    HEART CARE ENCOUNTER CONSULTATON NOTE      [facility]  [phone]      Assessment/Recommendations   Assessment/Plan:  1.  Epigastric discomfort.  This is in the setting of significant anxiety.  She describes the symptom does not sound clearly anginal in character.  As noted she has had a prior CT angiogram that demonstrated nonobstructive disease with an elevated calcium score.  We have placed her on nitroglycerin now currently 0.2 mg patch.  She felt that this made a difference with the chest discomfort previously described but states that the current discomfort is different in quality and she believes it is more related to anxiety or possibly dyspepsia.  This has been present for at least 2 months and is more persistent than episodic..  She does have some occasional improvement with use of Franny[place].  I have given her prescription for Pepcid 20 mg p.o. twice daily and asked her to monitor for improvement and to update us.  I have also asked her to make an appointment with her primary care provider soon as possible to further discuss her anxiety issues as well as GI related issues.      EKG today does demonstrate anterior T wave changes suggesting possible ischemia.  This is new from a prior EKG from January 2021 at which time she was more tachycardic.  In light of this finding and ongoing persistent chest discomfort we will plan a troponin now and suggested that we pursue a Lexiscan nuclear stress test is soon as possible.  I asked her to seek more immediate attention if symptoms are worsening or progressive.    2.  Hypertension.  She does report some wooziness.  Blood pressure today and in the pulmonary clinic demonstrated normal to normal blood pressure.  I  have asked her to cut the lisinopril to 2.5 mg daily and to monitor her blood pressure and report her blood pressure to us over the next few weeks.    3.  Dyslipidemia.  Lipids are from February 2022 at which time LDL cholesterol was 75 with an ideal LDL goal less than 70.    1.  Troponin today  2.  Lexiscan nuclear stress test to soon as possible  3.  Prescription for sublingual nitroglycerin.  4.  Patient asked to see her primary care physician as soon as possible to discuss anxiety issues and dyspepsia issues.  5.  Follow-up in 1 month with the nurse practitioner sooner if symptoms are progressive.       History of Present Illness/Subjective    HPI: Shantel Romero is a 77 year old female who is seen in follow-up.  She has a significant issue with respect to anxiety.  She notes that she has a persistent dull sensation in her chest that she relates to feeling anxious.  She also reports some GI upset.  She has taken Franny-[place] intermittently that has helped the discomfort.  We talked about additional evaluation with her primary care physician as a relates to her anxiety and GI upset.  I did give her a prescription for Pepcid.  She was seen yesterday in the pulmonary clinic by Dr. Bailey.  She endorses some woozy sensation and wonders if any of her medications could be contributing.  She is on lisinopril 5 mg daily.  Blood pressure today and blood pressure in the pulmonary clinic yesterday were well within normal limits and she is going to try to cut the lisinopril in half to 2.5 mg daily.  She has had some chronic dyspnea on exertion.  She has describes some chest discomfort in the past that was difficult to sort out but seem to improve with nitroglycerin.  She states that the current symptom that she is describing a ball sensation in her chest is different from that discomfort.  We discussed use of nitroglycerin.  She had a CT angiogram that demonstrated a calcium score of 407 with mild nonobstructive disease  in the left main, LAD and circumflex.  She continues off of tobacco.    Recent Echocardiogram Results:   appt: 08/09/2022 at 01:20 PM in Cardiology (Shivam Denise MD)     0 Result Notes    Details    Reading Physician Reading Date Result Priority   Shivam Denise MD  [phone] 1/30/2021 Routine   Provider, Historical 1/30/2021      Narrative & Impression    No previous study for comparison.    Normal left ventricular size. Mild left ventricular hypertrophy    Left ventricle ejection fraction is normal. The calculated left ventricular ejection fraction is 67%.    Possible mild right ventricular enlargement.    Normal right ventricular systolic function.    Mild right atrial enlargement.    Aortic valve sclerosis without evidence for significant aortic stenosis. Mild aortic insufficiency.    Trace pericardial effusion suggested            Recent Coronary Angiogram Results:  Exam Information    Exam Date Exam Time Exam Date Exam Time Accession # Performing Department Results    2/1/21 12:00 AM 2/1/21 12:01 PM [id number] [facility]        [id number]           PACS Images     Show images for CTA Angiogram coronary artery         Study Result    Narrative & Impression     The total Agatston calcium score is 507. A calcium score in this range places the individual in the 100th percentile when compared to an age and gender matched control group and implies a very high risk of cardiac events in the next ten years.    Right dominant coronary artery system.    Mild nonobstructive coronary artery disease in the left main, left anterior descending artery and circumflex artery.          [facility] HEART [facility]     HOLTER REPORT     Results:    Indication for study: Coronary artery disease    The predominant rhythm throughout the tracing was normal sinus rhythm with an average heart rate of 83 bpm.  The chronotropic response to activities of daily living appears to be normal.  The NC  interval was normal.  The QRS duration was normal.  The   QT interval was normal.  The study demonstrated no significant bradycardia/pauses.    The patient demonstrated atrial ectopy which was mildly increased at 1.1%.  Nonsustained ectopic atrial tachycardia was not observed.  Sustained ectopic atrial tachycardia, atrial fibrillation, or other supraventricular tachycardia was not observed.    The patient demonstrated rare ventricular ectopy.  Complex ventricular ectopy was  not observed.  Sustained ventricular tachycardia was not observed.    The patient did return a diary.  Patient reported symptoms of chest pain and dizziness on 2 occasions.  All 3 recordings demonstrated sinus rhythm after sinus tachycardia with heart rates ranging between 89 and 104 bpm.  Each of the recordings   demonstrated a single isolated PVC with no other ectopy or pathologic rhythm disturbance.     Impression:    Borderline Holter monitor with evidence of mildly increased atrial ectopy.  There was no sustained atrial arrhythmia.    Indication for study: Coronary artery disease.  Patient's symptoms of chest pain corresponded to mild sinus tachycardia with a isolated PVC.    Dizziness corresponded to sinus rhythm and a mild sinus tachycardia.  It is the reader's impression that the symptoms are out of proportion to the degree of ectopy.    No sustained atrial or ventricular tachyarrhythmia.    No profound bradycardia or pauses.        Comment: The recording was for 23 hrs and 59 min.  The recording quality was adequate.     EXAM: NM PET CT SKULL TO MID THIGH  LOCATION: [facility]  DATE/TIME: 2/4/2021 2:21 PM     INDICATION: Solitary pulmonary nodule, right upper lobe.  COMPARISON: CTA of the chest abdomen pelvis dated 01/29/2021 and CT of the chest dated 04/23/2019.  TECHNIQUE: Serum glucose level 134 mg/dL. One hour post intravenous administration of 9.1 mCi F-18 FDG, PET imaging was performed from the skull base  to the mid thighs utilizing attenuation correction with concurrent axial CT and PET/CT image fusion.   Dose reduction techniques were used.     FINDINGS: Mildly FDG avid spiculated nodule in the right upper lobe measuring 1.6 x 1.1 cm (max SUV 2.1, previously measured 1.1 x 0.9 cm on CT of the chest dated 04/23/2019) suspicious for primary lung neoplasm of low metabolic activity such as   adenocarcinoma without metastasis. Mild reflux esophagitis. The remaining FDG uptake is physiologic from the skull base to mid thigh.     Mild senescent intracranial changes. Postoperative change of the bilateral lenses. Mild to moderate carotid artery bifurcation calcification. Moderate coronary artery calcium. Right renal cysts. Tubal ligation. Multilevel degenerative changes of the   spine.     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     Physical Examination  Review of Systems   Vitals: 108/66, weight 153 pounds, heart rate of 62 and regular  Wt Readings from Last 3 Encounters:   08/08/22 68.3 kg (150 lb 9.6 oz)   06/13/22 69.6 kg (153 lb 6.4 oz)   02/14/22 70.4 kg (155 lb 4.8 oz)       General Appearance:   no distress, normal body habitus   ENT/Mouth:  Facemask in place      EYES:  no scleral icterus, normal conjunctivae   Neck: no carotid bruits or thyromegaly   Chest/Lungs:   lungs are decreased without rales or wheeze detected, equal chest wall expansion    Cardiovascular:    Distant, regular. Normal first and second heart sounds with no murmurs, rubs, or gallops; the carotid, radial and posterior tibial pulses are intact, Jugular venous pressure within normal limits, no significant edema bilaterally    Abdomen:   Bowel sounds positive mild epigastric discomfort to palpation   Extremities: no cyanosis or clubbing   Skin: no xanthelasma, warm.    Neurologic: , no tremors     Psychiatric: alert and oriented x3, calm        Please refer above for cardiac ROS details.         Medical History  Surgical History Family History Social History   Past Medical History:   Diagnosis Date     COPD (chronic obstructive pulmonary disease) (H)      Coronary artery disease due to lipid rich plaque 02/2021    Abnormal coronary CTA     Family history of myocardial infarction     mother under age 60's  CVA and MI     High cholesterol      Hypertension      PVC's (premature ventricular contractions)      Syncope 2012     Tubular adenoma of colon      Past Surgical History:   Procedure Laterality Date     APPENDECTOMY       SHOULDER SURGERY Right      TUBAL LIGATION       Family History   Problem Relation Age of Onset     Lung Cancer Father         lung     Breast Cancer Paternal Aunt      Cerebrovascular Disease Mother      Coronary Artery Disease Mother         Social History     Socioeconomic History     Marital status:      Spouse name: Not on file     Number of children: Not on file     Years of education: Not on file     Highest education level: Not on file   Occupational History     Not on file   Tobacco Use     Smoking status: Former Smoker     Types: Cigarettes     Quit date: 2019     Years since quitting: 3.6     Smokeless tobacco: Never Used   Substance and Sexual Activity     Alcohol use: Not on file     Drug use: Not on file     Sexual activity: Not on file   Other Topics Concern     Parent/sibling w/ CABG, MI or angioplasty before 65F 55M? Not Asked   Social History Narrative     Not on file     Social Determinants of Health     Financial Resource Strain: Not on file   Food Insecurity: Not on file   Transportation Needs: Not on file   Physical Activity: Not on file   Stress: Not on file   Social Connections: Not on file   Intimate Partner Violence: Not on file   Housing Stability: Not on file           Medications  Allergies   Current Outpatient Medications   Medication Sig Dispense Refill     acetaminophen (TYLENOL) 500 MG tablet Take 500-1,000 mg by mouth every 8 hours as needed         albuterol (PROAIR HFA/PROVENTIL HFA/VENTOLIN HFA) 108 (90 Base) MCG/ACT inhaler Inhale 2 puffs into the lungs       aspirin (ASA) 81 MG EC tablet Take 81 mg by mouth daily       atorvastatin (LIPITOR) 40 MG tablet Take 40 mg by mouth At Bedtime       Cholecalciferol 250 MCG (89142 UT) TABS Take 10,000 Units by mouth daily       docusate sodium (COLACE) 100 MG tablet Take 100 mg by mouth daily       fluticasone (FLONASE) 50 MCG/ACT nasal spray Spray 2 sprays in nostril daily       fluticasone-vilanterol (BREO ELLIPTA) 100-25 MCG/INH inhaler Inhale 1 puff into the lungs daily 60 each 3     HEMP OIL OR EXTRACT OR OTHER CBD CANNABINOID, NOT MEDICAL CANNABIS, Uses prn       IBANdronate (BONIVA) 150 MG tablet Take 150 mg by mouth every 30 days       lisinopril (ZESTRIL) 5 MG tablet Take 5 mg by mouth daily       Multiple Vitamin (MULTIVITAMIN ADULT PO) Take 1 tablet by mouth       nicotine (NICORETTE) 2 MG gum Take 2 mg by mouth       nitroGLYcerin (NITRODUR) 0.2 MG/HR 24 hr patch Place 1 patch onto the skin daily 90 patch 3     Omega-3 1000 MG capsule Take 1 g by mouth daily       Pine Bark, Pycnogenol, (PINE BARK, PINUS PINASTER, OR) Take 150 mg by mouth daily       tiotropium (SPIRIVA) 18 MCG inhaled capsule Inhale 18 mcg into the lungs daily       Turmeric 400 MG CAPS Take 800 mg by mouth daily       No Known Allergies       Lab Results    Chemistry/lipid CBC Cardiac Enzymes/BNP/TSH/INR   Recent Labs   Lab Test 02/04/22  1327 10/18/21  1329   CHOL  --  188   HDL  --  84   LDL 75 91   TRIG  --  67     Recent Labs   Lab Test 02/04/22  1327 10/18/21  1329 03/19/21  1309   LDL 75 91 70     Recent Labs   Lab Test 06/13/22  1324      POTASSIUM 4.7   CHLORIDE 102   CO2 27      BUN 17   CR 0.76   GFRESTIMATED 80   TRACE 10.9*     Recent Labs   Lab Test 06/13/22  1324 06/10/22  1311 02/11/22  1248   CR 0.76 0.7 0.7     No results for input(s): A1C in the last 60044 hours.       Recent Labs   Lab Test  07/18/22  1146 06/13/22  1324   WBC  --  8.5   HGB 12.1 13.4   HCT  --  42.0   MCV  --  103*   PLT  --  273     Recent Labs   Lab Test 07/18/22  1146 06/13/22  1324 06/07/21  1309   HGB 12.1 13.4 13.3    Recent Labs   Lab Test 01/30/21  0602 01/30/21  0023 01/29/21  1845   TROPONINI <0.01 <0.01 0.01     No results for input(s): BNP, NTBNPI, NTBNP in the last 00977 hours.  Recent Labs   Lab Test 10/07/19  1602   TSH 1.55     Recent Labs   Lab Test 01/29/21  1307   INR 1.00        Shivam Denise MD    Thank you for allowing me to participate in the care of your patient.      Sincerely,     Shivam Denise MD     [facility]  cc:   Shivam Denise MD  [address] [facility]  [address]  [place] [address]

## 2022-08-09 NOTE — PROGRESS NOTES
HEART CARE ENCOUNTER CONSULTATON NOTE      [facility]  [phone]      Assessment/Recommendations   Assessment/Plan:  1.  Epigastric discomfort.  This is in the setting of significant anxiety.  She describes the symptom does not sound clearly anginal in character.  As noted she has had a prior CT angiogram that demonstrated nonobstructive disease with an elevated calcium score.  We have placed her on nitroglycerin now currently 0.2 mg patch.  She felt that this made a difference with the chest discomfort previously described but states that the current discomfort is different in quality and she believes it is more related to anxiety or possibly dyspepsia.  This has been present for at least 2 months and is more persistent than episodic..  She does have some occasional improvement with use of Franny-Batson.  I have given her prescription for Pepcid 20 mg p.o. twice daily and asked her to monitor for improvement and to update us.  I have also asked her to make an appointment with her primary care provider soon as possible to further discuss her anxiety issues as well as GI related issues.      EKG today does demonstrate anterior T wave changes suggesting possible ischemia.  This is new from a prior EKG from January 2021 at which time she was more tachycardic.  In light of this finding and ongoing persistent chest discomfort we will plan a troponin now and suggested that we pursue a Lexiscan nuclear stress test is soon as possible.  I asked her to seek more immediate attention if symptoms are worsening or progressive.    2.  Hypertension.  She does report some wooziness.  Blood pressure today and in the pulmonary clinic demonstrated normal to normal blood pressure.  I have asked her to cut the lisinopril to 2.5 mg daily and to monitor her blood pressure and report her blood pressure to us over the next few weeks.    3.  Dyslipidemia.  Lipids are from February 2022 at which time LDL cholesterol  was 75 with an ideal LDL goal less than 70.    1.  Troponin today  2.  Lexiscan nuclear stress test to soon as possible  3.  Prescription for sublingual nitroglycerin.  4.  Patient asked to see her primary care physician as soon as possible to discuss anxiety issues and dyspepsia issues.  5.  Follow-up in 1 month with the nurse practitioner sooner if symptoms are progressive.       History of Present Illness/Subjective    HPI: Shantel Romero is a 77 year old female who is seen in follow-up.  She has a significant issue with respect to anxiety.  She notes that she has a persistent dull sensation in her chest that she relates to feeling anxious.  She also reports some GI upset.  She has taken Franny-Daisy intermittently that has helped the discomfort.  We talked about additional evaluation with her primary care physician as a relates to her anxiety and GI upset.  I did give her a prescription for Pepcid.  She was seen yesterday in the pulmonary clinic by Dr. Bailey.  She endorses some woozy sensation and wonders if any of her medications could be contributing.  She is on lisinopril 5 mg daily.  Blood pressure today and blood pressure in the pulmonary clinic yesterday were well within normal limits and she is going to try to cut the lisinopril in half to 2.5 mg daily.  She has had some chronic dyspnea on exertion.  She has describes some chest discomfort in the past that was difficult to sort out but seem to improve with nitroglycerin.  She states that the current symptom that she is describing a ball sensation in her chest is different from that discomfort.  We discussed use of nitroglycerin.  She had a CT angiogram that demonstrated a calcium score of 407 with mild nonobstructive disease in the left main, LAD and circumflex.  She continues off of tobacco.    Recent Echocardiogram Results:   appt: 08/09/2022 at 01:20 PM in Cardiology (Shivam Denise MD)     0 Result Notes    Details    Reading Physician Reading  Date Result Priority   Shivam Denise MD  [phone] 1/30/2021 Routine   Provider, Historical 1/30/2021      Narrative & Impression    No previous study for comparison.    Normal left ventricular size. Mild left ventricular hypertrophy    Left ventricle ejection fraction is normal. The calculated left ventricular ejection fraction is 67%.    Possible mild right ventricular enlargement.    Normal right ventricular systolic function.    Mild right atrial enlargement.    Aortic valve sclerosis without evidence for significant aortic stenosis. Mild aortic insufficiency.    Trace pericardial effusion suggested            Recent Coronary Angiogram Results:  Exam Information    Exam Date Exam Time Exam Date Exam Time Accession # Performing Department Results    2/1/21 12:00 AM 2/1/21 12:01 PM [id number] [facility] CT        [id number]           PACS Images     Show images for CTA Angiogram coronary artery         Study Result    Narrative & Impression     The total Agatston calcium score is 507. A calcium score in this range places the individual in the 100th percentile when compared to an age and gender matched control group and implies a very high risk of cardiac events in the next ten years.    Right dominant coronary artery system.    Mild nonobstructive coronary artery disease in the left main, left anterior descending artery and circumflex artery.          [facility]     HOLTER REPORT     Results:    Indication for study: Coronary artery disease    The predominant rhythm throughout the tracing was normal sinus rhythm with an average heart rate of 83 bpm.  The chronotropic response to activities of daily living appears to be normal.  The MD interval was normal.  The QRS duration was normal.  The   QT interval was normal.  The study demonstrated no significant bradycardia/pauses.    The patient demonstrated atrial ectopy which was mildly increased at 1.1%.   Nonsustained ectopic atrial tachycardia was not observed.  Sustained ectopic atrial tachycardia, atrial fibrillation, or other supraventricular tachycardia was not observed.    The patient demonstrated rare ventricular ectopy.  Complex ventricular ectopy was  not observed.  Sustained ventricular tachycardia was not observed.    The patient did return a diary.  Patient reported symptoms of chest pain and dizziness on 2 occasions.  All 3 recordings demonstrated sinus rhythm after sinus tachycardia with heart rates ranging between 89 and 104 bpm.  Each of the recordings   demonstrated a single isolated PVC with no other ectopy or pathologic rhythm disturbance.     Impression:    Borderline Holter monitor with evidence of mildly increased atrial ectopy.  There was no sustained atrial arrhythmia.    Indication for study: Coronary artery disease.  Patient's symptoms of chest pain corresponded to mild sinus tachycardia with a isolated PVC.    Dizziness corresponded to sinus rhythm and a mild sinus tachycardia.  It is the reader's impression that the symptoms are out of proportion to the degree of ectopy.    No sustained atrial or ventricular tachyarrhythmia.    No profound bradycardia or pauses.        Comment: The recording was for 23 hrs and 59 min.  The recording quality was adequate.     EXAM: NM PET CT SKULL TO MID THIGH  LOCATION: [facility]  DATE/TIME: 2/4/2021 2:21 PM     INDICATION: Solitary pulmonary nodule, right upper lobe.  COMPARISON: CTA of the chest abdomen pelvis dated 01/29/2021 and CT of the chest dated 04/23/2019.  TECHNIQUE: Serum glucose level 134 mg/dL. One hour post intravenous administration of 9.1 mCi F-18 FDG, PET imaging was performed from the skull base to the mid thighs utilizing attenuation correction with concurrent axial CT and PET/CT image fusion.   Dose reduction techniques were used.     FINDINGS: Mildly FDG avid spiculated nodule in the right upper lobe  measuring 1.6 x 1.1 cm (max SUV 2.1, previously measured 1.1 x 0.9 cm on CT of the chest dated 04/23/2019) suspicious for primary lung neoplasm of low metabolic activity such as   adenocarcinoma without metastasis. Mild reflux esophagitis. The remaining FDG uptake is physiologic from the skull base to mid thigh.     Mild senescent intracranial changes. Postoperative change of the bilateral lenses. Mild to moderate carotid artery bifurcation calcification. Moderate coronary artery calcium. Right renal cysts. Tubal ligation. Multilevel degenerative changes of the   spine.     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     Physical Examination  Review of Systems   Vitals: 108/66, weight 153 pounds, heart rate of 62 and regular  Wt Readings from Last 3 Encounters:   08/08/22 68.3 kg (150 lb 9.6 oz)   06/13/22 69.6 kg (153 lb 6.4 oz)   02/14/22 70.4 kg (155 lb 4.8 oz)       General Appearance:   no distress, normal body habitus   ENT/Mouth:  Facemask in place      EYES:  no scleral icterus, normal conjunctivae   Neck: no carotid bruits or thyromegaly   Chest/Lungs:   lungs are decreased without rales or wheeze detected, equal chest wall expansion    Cardiovascular:    Distant, regular. Normal first and second heart sounds with no murmurs, rubs, or gallops; the carotid, radial and posterior tibial pulses are intact, Jugular venous pressure within normal limits, no significant edema bilaterally    Abdomen:   Bowel sounds positive mild epigastric discomfort to palpation   Extremities: no cyanosis or clubbing   Skin: no xanthelasma, warm.    Neurologic: , no tremors     Psychiatric: alert and oriented x3, calm        Please refer above for cardiac ROS details.        Medical History  Surgical History Family History Social History   Past Medical History:   Diagnosis Date     COPD (chronic obstructive pulmonary disease) (H)      Coronary artery disease due to lipid rich plaque  02/2021    Abnormal coronary CTA     Family history of myocardial infarction     mother under age 60's  CVA and MI     High cholesterol      Hypertension      PVC's (premature ventricular contractions)      Syncope 2012     Tubular adenoma of colon      Past Surgical History:   Procedure Laterality Date     APPENDECTOMY       SHOULDER SURGERY Right      TUBAL LIGATION       Family History   Problem Relation Age of Onset     Lung Cancer Father         lung     Breast Cancer Paternal Aunt      Cerebrovascular Disease Mother      Coronary Artery Disease Mother         Social History     Socioeconomic History     Marital status:      Spouse name: Not on file     Number of children: Not on file     Years of education: Not on file     Highest education level: Not on file   Occupational History     Not on file   Tobacco Use     Smoking status: Former Smoker     Types: Cigarettes     Quit date: 2019     Years since quitting: 3.6     Smokeless tobacco: Never Used   Substance and Sexual Activity     Alcohol use: Not on file     Drug use: Not on file     Sexual activity: Not on file   Other Topics Concern     Parent/sibling w/ CABG, MI or angioplasty before 65F 55M? Not Asked   Social History Narrative     Not on file     Social Determinants of Health     Financial Resource Strain: Not on file   Food Insecurity: Not on file   Transportation Needs: Not on file   Physical Activity: Not on file   Stress: Not on file   Social Connections: Not on file   Intimate Partner Violence: Not on file   Housing Stability: Not on file           Medications  Allergies   Current Outpatient Medications   Medication Sig Dispense Refill     acetaminophen (TYLENOL) 500 MG tablet Take 500-1,000 mg by mouth every 8 hours as needed        albuterol (PROAIR HFA/PROVENTIL HFA/VENTOLIN HFA) 108 (90 Base) MCG/ACT inhaler Inhale 2 puffs into the lungs       aspirin (ASA) 81 MG EC tablet Take 81 mg by mouth daily       atorvastatin (LIPITOR) 40 MG  tablet Take 40 mg by mouth At Bedtime       Cholecalciferol 250 MCG (19219 UT) TABS Take 10,000 Units by mouth daily       docusate sodium (COLACE) 100 MG tablet Take 100 mg by mouth daily       fluticasone (FLONASE) 50 MCG/ACT nasal spray Spray 2 sprays in nostril daily       fluticasone-vilanterol (BREO ELLIPTA) 100-25 MCG/INH inhaler Inhale 1 puff into the lungs daily 60 each 3     HEMP OIL OR EXTRACT OR OTHER CBD CANNABINOID, NOT MEDICAL CANNABIS, Uses prn       IBANdronate (BONIVA) 150 MG tablet Take 150 mg by mouth every 30 days       lisinopril (ZESTRIL) 5 MG tablet Take 5 mg by mouth daily       Multiple Vitamin (MULTIVITAMIN ADULT PO) Take 1 tablet by mouth       nicotine (NICORETTE) 2 MG gum Take 2 mg by mouth       nitroGLYcerin (NITRODUR) 0.2 MG/HR 24 hr patch Place 1 patch onto the skin daily 90 patch 3     Omega-3 1000 MG capsule Take 1 g by mouth daily       Pine Bark, Pycnogenol, (PINE BARK, PINUS PINASTER, OR) Take 150 mg by mouth daily       tiotropium (SPIRIVA) 18 MCG inhaled capsule Inhale 18 mcg into the lungs daily       Turmeric 400 MG CAPS Take 800 mg by mouth daily       No Known Allergies       Lab Results    Chemistry/lipid CBC Cardiac Enzymes/BNP/TSH/INR   Recent Labs   Lab Test 02/04/22  1327 10/18/21  1329   CHOL  --  188   HDL  --  84   LDL 75 91   TRIG  --  67     Recent Labs   Lab Test 02/04/22  1327 10/18/21  1329 03/19/21  1309   LDL 75 91 70     Recent Labs   Lab Test 06/13/22  1324      POTASSIUM 4.7   CHLORIDE 102   CO2 27      BUN 17   CR 0.76   GFRESTIMATED 80   TRACE 10.9*     Recent Labs   Lab Test 06/13/22  1324 06/10/22  1311 02/11/22  1248   CR 0.76 0.7 0.7     No results for input(s): A1C in the last 22626 hours.       Recent Labs   Lab Test 07/18/22  1146 06/13/22  1324   WBC  --  8.5   HGB 12.1 13.4   HCT  --  42.0   MCV  --  103*   PLT  --  273     Recent Labs   Lab Test 07/18/22  1146 06/13/22  1324 06/07/21  1309   HGB 12.1 13.4 13.3    Recent Labs   Lab  Test 01/30/21  0602 01/30/21  0023 01/29/21  1845   TROPONINI <0.01 <0.01 0.01     No results for input(s): BNP, NTBNPI, NTBNP in the last 05177 hours.  Recent Labs   Lab Test 10/07/19  1602   TSH 1.55     Recent Labs   Lab Test 01/29/21  1307   INR 1.00        Shivam Denise MD

## 2022-08-10 ENCOUNTER — TELEPHONE (OUTPATIENT)
Dept: CARDIOLOGY | Facility: CLINIC | Age: 77
End: 2022-08-10

## 2022-08-10 DIAGNOSIS — R10.13 DYSPEPSIA: ICD-10-CM

## 2022-08-10 DIAGNOSIS — I25.83 CORONARY ARTERY DISEASE DUE TO LIPID RICH PLAQUE: ICD-10-CM

## 2022-08-10 DIAGNOSIS — I25.10 CORONARY ARTERY DISEASE DUE TO LIPID RICH PLAQUE: ICD-10-CM

## 2022-08-10 LAB
ATRIAL RATE - MUSE: 75 BPM
DIASTOLIC BLOOD PRESSURE - MUSE: NORMAL MMHG
INTERPRETATION ECG - MUSE: NORMAL
P AXIS - MUSE: 75 DEGREES
PR INTERVAL - MUSE: 178 MS
QRS DURATION - MUSE: 84 MS
QT - MUSE: 364 MS
QTC - MUSE: 406 MS
R AXIS - MUSE: 61 DEGREES
SYSTOLIC BLOOD PRESSURE - MUSE: NORMAL MMHG
T AXIS - MUSE: 58 DEGREES
VENTRICULAR RATE- MUSE: 75 BPM

## 2022-08-10 RX ORDER — FAMOTIDINE 20 MG/1
20 TABLET, FILM COATED ORAL 2 TIMES DAILY
Qty: 180 TABLET | Refills: 3 | Status: SHIPPED | OUTPATIENT
Start: 2022-08-10

## 2022-08-18 ENCOUNTER — HOSPITAL ENCOUNTER (OUTPATIENT)
Dept: NUCLEAR MEDICINE | Facility: CLINIC | Age: 77
Discharge: HOME | End: 2022-08-18
Attending: INTERNAL MEDICINE
Payer: MEDICARE

## 2022-08-18 ENCOUNTER — HOSPITAL ENCOUNTER (OUTPATIENT)
Dept: CARDIOLOGY | Facility: CLINIC | Age: 77
Discharge: HOME | End: 2022-08-18
Attending: INTERNAL MEDICINE
Payer: MEDICARE

## 2022-08-18 DIAGNOSIS — I25.83 CORONARY ARTERY DISEASE DUE TO LIPID RICH PLAQUE: ICD-10-CM

## 2022-08-18 DIAGNOSIS — I25.10 CORONARY ARTERY DISEASE DUE TO LIPID RICH PLAQUE: ICD-10-CM

## 2022-08-18 LAB
CV BLOOD PRESSURE: 75 MMHG
CV STRESS CURRENT BP HE: NORMAL
CV STRESS CURRENT HR HE: 102
CV STRESS CURRENT HR HE: 103
CV STRESS CURRENT HR HE: 105
CV STRESS CURRENT HR HE: 107
CV STRESS CURRENT HR HE: 108
CV STRESS CURRENT HR HE: 110
CV STRESS CURRENT HR HE: 110
CV STRESS CURRENT HR HE: 78
CV STRESS CURRENT HR HE: 79
CV STRESS CURRENT HR HE: 82
CV STRESS CURRENT HR HE: 84
CV STRESS CURRENT HR HE: 85
CV STRESS CURRENT HR HE: 86
CV STRESS CURRENT HR HE: 86
CV STRESS CURRENT HR HE: 87
CV STRESS CURRENT HR HE: 88
CV STRESS CURRENT HR HE: 88
CV STRESS CURRENT HR HE: 91
CV STRESS CURRENT HR HE: 93
CV STRESS CURRENT HR HE: 94
CV STRESS CURRENT HR HE: 97
CV STRESS CURRENT HR HE: 97
CV STRESS CURRENT HR HE: 98
CV STRESS DEVIATION TIME HE: NORMAL
CV STRESS ECHO PERCENT HR HE: NORMAL
CV STRESS EXERCISE STAGE HE: NORMAL
CV STRESS FINAL RESTING BP HE: NORMAL
CV STRESS FINAL RESTING HR HE: 88
CV STRESS MAX HR HE: 110
CV STRESS MAX TREADMILL GRADE HE: 0
CV STRESS MAX TREADMILL SPEED HE: 0
CV STRESS PEAK DIA BP HE: NORMAL
CV STRESS PEAK SYS BP HE: NORMAL
CV STRESS PHASE HE: NORMAL
CV STRESS PROTOCOL HE: NORMAL
CV STRESS RESTING PT POSITION HE: NORMAL
CV STRESS ST DEVIATION AMOUNT HE: NORMAL
CV STRESS ST DEVIATION ELEVATION HE: NORMAL
CV STRESS ST EVELATION AMOUNT HE: NORMAL
CV STRESS TEST TYPE HE: NORMAL
CV STRESS TOTAL STAGE TIME MIN 1 HE: NORMAL
RATE PRESSURE PRODUCT: NORMAL
STRESS ECHO BASELINE DIASTOLIC HE: 73
STRESS ECHO BASELINE HR: 81
STRESS ECHO BASELINE SYSTOLIC BP: 134
STRESS ECHO CALCULATED PERCENT HR: 77 %
STRESS ECHO LAST STRESS DIASTOLIC BP: 72
STRESS ECHO LAST STRESS HR: 102
STRESS ECHO LAST STRESS SYSTOLIC BP: 124
STRESS ECHO TARGET HR: 142

## 2022-08-18 PROCEDURE — 343N000001 HC RX 343: Performed by: INTERNAL MEDICINE

## 2022-08-18 PROCEDURE — 93017 CV STRESS TEST TRACING ONLY: CPT

## 2022-08-18 PROCEDURE — 93018 CV STRESS TEST I&R ONLY: CPT | Performed by: INTERNAL MEDICINE

## 2022-08-18 PROCEDURE — 93016 CV STRESS TEST SUPVJ ONLY: CPT | Performed by: INTERNAL MEDICINE

## 2022-08-18 PROCEDURE — 78452 HT MUSCLE IMAGE SPECT MULT: CPT | Mod: 26 | Performed by: INTERNAL MEDICINE

## 2022-08-18 PROCEDURE — 250N000011 HC RX IP 250 OP 636: Performed by: INTERNAL MEDICINE

## 2022-08-18 PROCEDURE — G1010 CDSM STANSON: HCPCS | Performed by: INTERNAL MEDICINE

## 2022-08-18 PROCEDURE — A9500 TC99M SESTAMIBI: HCPCS | Performed by: INTERNAL MEDICINE

## 2022-08-18 PROCEDURE — G1010 CDSM STANSON: HCPCS

## 2022-08-18 RX ORDER — REGADENOSON 0.08 MG/ML
0.4 INJECTION, SOLUTION INTRAVENOUS ONCE
Status: COMPLETED | OUTPATIENT
Start: 2022-08-18 | End: 2022-08-18

## 2022-08-18 RX ORDER — AMINOPHYLLINE 25 MG/ML
50 INJECTION, SOLUTION INTRAVENOUS
Status: DISCONTINUED | OUTPATIENT
Start: 2022-08-18 | End: 2022-08-18 | Stop reason: HOSPADM

## 2022-08-18 RX ADMIN — Medication 8.2 MILLICURIE: at 12:32

## 2022-08-18 RX ADMIN — Medication 29.5 MILLICURIE: at 14:05

## 2022-08-18 RX ADMIN — AMINOPHYLLINE 50 MG: 25 INJECTION, SOLUTION INTRAVENOUS at 14:09

## 2022-08-18 RX ADMIN — REGADENOSON 0.4 MG: 0.08 INJECTION, SOLUTION INTRAVENOUS at 14:05

## 2022-08-21 ENCOUNTER — MYC MEDICAL ADVICE (OUTPATIENT)
Dept: CARDIOLOGY | Facility: CLINIC | Age: 77
End: 2022-08-21

## 2022-09-07 ENCOUNTER — OFFICE VISIT (OUTPATIENT)
Dept: CARDIOLOGY | Facility: CLINIC | Age: 77
End: 2022-09-07
Payer: MEDICARE

## 2022-09-07 VITALS
BODY MASS INDEX: 24.53 KG/M2 | RESPIRATION RATE: 14 BRPM | DIASTOLIC BLOOD PRESSURE: 68 MMHG | WEIGHT: 152 LBS | HEART RATE: 72 BPM | SYSTOLIC BLOOD PRESSURE: 126 MMHG

## 2022-09-07 DIAGNOSIS — I25.10 CORONARY ARTERY DISEASE DUE TO LIPID RICH PLAQUE: ICD-10-CM

## 2022-09-07 DIAGNOSIS — R07.9 CHEST PAIN, UNSPECIFIED TYPE: Primary | ICD-10-CM

## 2022-09-07 DIAGNOSIS — R10.13 DYSPEPSIA: ICD-10-CM

## 2022-09-07 DIAGNOSIS — I25.83 CORONARY ARTERY DISEASE DUE TO LIPID RICH PLAQUE: ICD-10-CM

## 2022-09-07 DIAGNOSIS — R06.09 DYSPNEA ON EXERTION: ICD-10-CM

## 2022-09-07 DIAGNOSIS — I10 PRIMARY HYPERTENSION: ICD-10-CM

## 2022-09-07 DIAGNOSIS — R94.31 ABNORMAL ELECTROCARDIOGRAM: ICD-10-CM

## 2022-09-07 PROCEDURE — 99215 OFFICE O/P EST HI 40 MIN: CPT | Performed by: NURSE PRACTITIONER

## 2022-09-07 NOTE — PATIENT INSTRUCTIONS
Shantel Romero,    It was a pleasure to see you today at the [facility].     My recommendations after this visit include:    - No medications changes made today    - Follow up with Dr. Denise in 4-5 months     - Please call [phone], if you have any questions or concerns    Angeles Garcia CNP

## 2022-09-07 NOTE — PROGRESS NOTES
Assessment/Recommendations   Assessment:    1.  Mild nonobstructive coronary disease per CT coronary angiogram in February 2021: Recent abnormal EKG with T wave inversion suggesting anterior ischemia.  Nuclear stress test on 818 showed negative for inducible myocardial ischemia.    Patient reports chronic shortness of breath on exertion which is unchanged from baseline.  She epigastric discomfort has improved since on Pepcid.  She is following up with PCP next month.    On nitro patch 0.2 mg/24 hours daily for the last couple years or so.     Symptom was thought to be more GI related and patient was started on Pepcid.    2.  Dyslipidemia with LDL goal <70: LDL is 91 in October 2021.  She is on atorvastatin 40 mg daily.  AST/ALT are stable in June 2022.    4.  Hypertension: Her blood pressure is 126/68-well-controlled. Currently on lisinopril 2.5 Mg daily.  She feels improvement in her wooziness since reduced lisinopril dose.    Plan/Recommendation:  -No medication changes made today  -Risk factor modification and lifestyle management topics were discussed including managing comorbidities, heart healthy diet, exercise, stress reduction and alcohol use.    - We discussed a diet low in saturated fat, weight loss, and exercise along with medication for better control of cholesterol.   -She plans to have her fasting lipid check during next follow-up visit with PCP.  -Continue current hypertension regimen    Follow up with Dr. Denise in 4 to 5 months     History of Present Illness/Subjective    Ms. Shantel Romero is a 77 year old female with a past medical history of mild nonobstructive coronary disease per CT current angiogram in February 2021, former smoker,, malignant neoplasm of upper lobe of right lung, hypertension, abnormal EKG, dyspnea on exertion, and chest discomfort who is seen at [facility] for follow-up recommendation from Dr. Denise.    Patient saw Dr. Denise about  a month ago.  She was experiencing some epigastric discomfort.  EKG showed T wave changes in anterior wall suggesting ischemia.  Started on Nitropatch.  She was also started on Pepcid.  Lexiscan stress test was negative for inducible myocardial ischemia.  She was recommended follow-up with PCP to discuss about anxiety and her GI issues.    Today, Cosme reports feeling improvement in her epigastric discomfort since on Pepcid. She denies fatigue, lightheadedness, shortness of breath, orthopnea, PND, palpitations, chest pain, abdominal fullness/bloating and lower extremity edema.  She does not report any bleeding complications.  She states her appetite has been poor since she stopped smoking.  She denies any major weight loss in the last 6 months.  She does not report any fever, chills or night sweats.  She follows up with Dr. Barnhart in oncology clinic for lung cancer.    Lexiscan stress test done on 8/18/2022-reviewed  Result Text     The nuclear stress test is negative for inducible myocardial ischemia or infarction. The left ventricular ejection fraction at rest is 75%. Left ventricular function is normal.     A prior study was conducted on 5/23/2017.     CT coronary angiogram done in February 2021-reviewed  Study Result  Narrative & Impression     The total Agatston calcium score is 507. A calcium score in this range places the individual in the 100th percentile when compared to an age and gender matched control group and implies a very high risk of cardiac events in the next ten years.    Right dominant coronary artery system.    Mild nonobstructive coronary artery disease in the left main, left anterior descending artery and circumflex artery.        Physical Examination Review of Systems   /68 (BP Location: Right arm, Patient Position: Sitting, Cuff Size: Adult Regular)   Pulse 72   Resp 14   Wt 68.9 kg (152 lb)   BMI 24.53 kg/m    Body mass index is 24.53 kg/m .  Wt Readings from Last 3 Encounters:    09/07/22 68.9 kg (152 lb)   08/09/22 69.4 kg (153 lb 1.6 oz)   08/08/22 68.3 kg (150 lb 9.6 oz)     General Appearance:   no distress, normal body habitus   ENT/Mouth: membranes moist, no oral lesions or bleeding gums.      EYES:  no scleral icterus, normal conjunctivae   Neck: no carotid bruits or thyromegaly   Chest/Lungs:   lungs are clear to auscultation, no rales or wheezing, equal chest wall expansion    Cardiovascular:    Heart rate regular. Normal first and second heart sounds with no murmurs, rubs, or gallops; the carotid, radial and posterior tibial pulses are intact, Jugular venous pressure flat, no edema bilaterally    Abdomen:  no organomegaly, masses, bruits, or tenderness; bowel sounds are present   Extremities   no cyanosis or clubbing   Radial pulses and Pedal pulses intact and symmetrical.  CMS intact.   Skin: no xanthelasma, warm.    Neurologic: normal  bilateral, no tremors     Psychiatric: alert and oriented x3, calm            Negative unless noted in HPI     Medical History  Surgical History Family History Social History   Past Medical History:   Diagnosis Date     COPD (chronic obstructive pulmonary disease) (H)      Coronary artery disease due to lipid rich plaque 02/2021    Abnormal coronary CTA     Family history of myocardial infarction     mother under age 60's  CVA and MI     High cholesterol      Hypertension      PVC's (premature ventricular contractions)      Syncope 2012     Tubular adenoma of colon     Past Surgical History:   Procedure Laterality Date     APPENDECTOMY       SHOULDER SURGERY Right      TUBAL LIGATION      Family History   Problem Relation Age of Onset     Lung Cancer Father         lung     Breast Cancer Paternal Aunt      Cerebrovascular Disease Mother      Coronary Artery Disease Mother     Social History     Socioeconomic History     Marital status:      Spouse name: Not on file     Number of children: Not on file     Years of education: Not on  file     Highest education level: Not on file   Occupational History     Not on file   Tobacco Use     Smoking status: Former Smoker     Types: Cigarettes     Quit date: 2019     Years since quitting: 3.6     Smokeless tobacco: Never Used   Substance and Sexual Activity     Alcohol use: Not on file     Drug use: Not on file     Sexual activity: Not on file   Other Topics Concern     Parent/sibling w/ CABG, MI or angioplasty before 65F 55M? Not Asked   Social History Narrative     Not on file     Social Determinants of Health     Financial Resource Strain: Not on file   Food Insecurity: Not on file   Transportation Needs: Not on file   Physical Activity: Not on file   Stress: Not on file   Social Connections: Not on file   Intimate Partner Violence: Not on file   Housing Stability: Not on file          Medications  Allergies   Current Outpatient Medications   Medication Sig Dispense Refill     acetaminophen (TYLENOL) 500 MG tablet Take 500-1,000 mg by mouth every 8 hours as needed        albuterol (PROAIR HFA/PROVENTIL HFA/VENTOLIN HFA) 108 (90 Base) MCG/ACT inhaler Inhale 2 puffs into the lungs       aspirin (ASA) 81 MG EC tablet Take 81 mg by mouth daily       atorvastatin (LIPITOR) 40 MG tablet Take 40 mg by mouth At Bedtime       Cholecalciferol 250 MCG (94232 UT) TABS Take 10,000 Units by mouth daily       docusate sodium (COLACE) 100 MG tablet Take 100 mg by mouth daily       famotidine (PEPCID) 20 MG tablet Take 1 tablet (20 mg) by mouth 2 times daily 180 tablet 3     fluticasone (FLONASE) 50 MCG/ACT nasal spray Spray 2 sprays in nostril as needed       fluticasone-salmeterol (ADVAIR) 250-50 MCG/ACT inhaler Inhale 1 puff into the lungs 2 times daily 180 each 1     HEMP OIL OR EXTRACT OR OTHER CBD CANNABINOID, NOT MEDICAL CANNABIS, Uses prn       IBANdronate (BONIVA) 150 MG tablet Take 150 mg by mouth every 30 days       lisinopril (ZESTRIL) 5 MG tablet Take 2.5 mg by mouth daily       Multiple Vitamin  (MULTIVITAMIN ADULT PO) Take 1 tablet by mouth       nicotine (NICORETTE) 2 MG gum Take 2 mg by mouth daily       nitroGLYcerin (NITRODUR) 0.2 MG/HR 24 hr patch Place 1 patch onto the skin daily 90 patch 3     nitroGLYcerin (NITROSTAT) 0.4 MG sublingual tablet For chest pain place 1 tablet under the tongue every 5 minutes for 3 doses. If symptoms persist 5 minutes after 1st dose call 911. 25 tablet 4     Omega-3 1000 MG capsule Take 1 g by mouth daily       Pine Bark, Pycnogenol, (PINE BARK, PINUS PINASTER, OR) Take 150 mg by mouth daily       tiotropium (SPIRIVA) 18 MCG inhaled capsule Inhale 18 mcg into the lungs daily       Turmeric 400 MG CAPS Take 800 mg by mouth daily      No Known Allergies      Lab Results    Chemistry/lipid CBC Cardiac Enzymes/BNP/TSH/INR   Lab Results   Component Value Date    CHOL 188 10/18/2021    HDL 84 10/18/2021    TRIG 67 10/18/2021    BUN 17 06/13/2022     06/13/2022    CO2 27 06/13/2022    Lab Results   Component Value Date    WBC 8.5 06/13/2022    HGB 12.1 07/18/2022    HCT 42.0 06/13/2022     (H) 06/13/2022     06/13/2022    Lab Results   Component Value Date    TROPONINI <0.01 08/09/2022    TSH 1.55 10/07/2019    INR 1.00 01/29/2021        40  minutes spent on the date of encounter doing chart review, review of test results, interpretation with above tests, patient visit, documentation and discussion with other provider.        This note has been dictated using voice recognition software. Any grammatical, typographical, or context distortions are unintentional and inherent to the software

## 2022-09-07 NOTE — LETTER
9/7/2022    LUCRETIA ROSARIO MD  [facility] [address]  [place] [address]    RE: Shantel Romero       Dear Colleague,     I had the pleasure of seeing Shantel Romero in the [facility].          Assessment/Recommendations   Assessment:    1.  Mild nonobstructive coronary disease per CT coronary angiogram in February 2021: Recent abnormal EKG with T wave inversion suggesting anterior ischemia.  Nuclear stress test on 818 showed negative for inducible myocardial ischemia.    Patient reports chronic shortness of breath on exertion which is unchanged from baseline.  She epigastric discomfort has improved since on Pepcid.  She is following up with PCP next month.    On nitro patch 0.2 mg/24 hours daily for the last couple years or so.     Symptom was thought to be more GI related and patient was started on Pepcid.    2.  Dyslipidemia with LDL goal <70: LDL is 91 in October 2021.  She is on atorvastatin 40 mg daily.  AST/ALT are stable in June 2022.    4.  Hypertension: Her blood pressure is 126/68-well-controlled. Currently on lisinopril 2.5 Mg daily.  She feels improvement in her wooziness since reduced lisinopril dose.    Plan/Recommendation:  -No medication changes made today  -Risk factor modification and lifestyle management topics were discussed including managing comorbidities, heart healthy diet, exercise, stress reduction and alcohol use.    - We discussed a diet low in saturated fat, weight loss, and exercise along with medication for better control of cholesterol.   -She plans to have her fasting lipid check during next follow-up visit with PCP.  -Continue current hypertension regimen    Follow up with Dr. Denise in 4 to 5 months     History of Present Illness/Subjective    Ms. Shantel Romero is a 77 year old female with a past medical history of mild nonobstructive coronary disease per CT current angiogram in February 2021, former smoker,, malignant neoplasm of  upper lobe of right lung, hypertension, abnormal EKG, dyspnea on exertion, and chest discomfort who is seen at [facility] for follow-up recommendation from Dr. Denise.    Patient saw Dr. Denise about a month ago.  She was experiencing some epigastric discomfort.  EKG showed T wave changes in anterior wall suggesting ischemia.  Started on Nitropatch.  She was also started on Pepcid.  Lexiscan stress test was negative for inducible myocardial ischemia.  She was recommended follow-up with PCP to discuss about anxiety and her GI issues.    Today, Cosme reports feeling improvement in her epigastric discomfort since on Pepcid. She denies fatigue, lightheadedness, shortness of breath, orthopnea, PND, palpitations, chest pain, abdominal fullness/bloating and lower extremity edema.  She does not report any bleeding complications.  She states her appetite has been poor since she stopped smoking.  She denies any major weight loss in the last 6 months.  She does not report any fever, chills or night sweats.  She follows up with Dr. Barnhart in oncology clinic for lung cancer.    Lexiscan stress test done on 8/18/2022-reviewed  Result Text     The nuclear stress test is negative for inducible myocardial ischemia or infarction. The left ventricular ejection fraction at rest is 75%. Left ventricular function is normal.     A prior study was conducted on 5/23/2017.     CT coronary angiogram done in February 2021-reviewed  Study Result  Narrative & Impression     The total Agatston calcium score is 507. A calcium score in this range places the individual in the 100th percentile when compared to an age and gender matched control group and implies a very high risk of cardiac events in the next ten years.    Right dominant coronary artery system.    Mild nonobstructive coronary artery disease in the left main, left anterior descending artery and circumflex artery.        Physical Examination Review of  Systems   /68 (BP Location: Right arm, Patient Position: Sitting, Cuff Size: Adult Regular)   Pulse 72   Resp 14   Wt 68.9 kg (152 lb)   BMI 24.53 kg/m    Body mass index is 24.53 kg/m .  Wt Readings from Last 3 Encounters:   09/07/22 68.9 kg (152 lb)   08/09/22 69.4 kg (153 lb 1.6 oz)   08/08/22 68.3 kg (150 lb 9.6 oz)     General Appearance:   no distress, normal body habitus   ENT/Mouth: membranes moist, no oral lesions or bleeding gums.      EYES:  no scleral icterus, normal conjunctivae   Neck: no carotid bruits or thyromegaly   Chest/Lungs:   lungs are clear to auscultation, no rales or wheezing, equal chest wall expansion    Cardiovascular:    Heart rate regular. Normal first and second heart sounds with no murmurs, rubs, or gallops; the carotid, radial and posterior tibial pulses are intact, Jugular venous pressure flat, no edema bilaterally    Abdomen:  no organomegaly, masses, bruits, or tenderness; bowel sounds are present   Extremities   no cyanosis or clubbing   Radial pulses and Pedal pulses intact and symmetrical.  CMS intact.   Skin: no xanthelasma, warm.    Neurologic: normal  bilateral, no tremors     Psychiatric: alert and oriented x3, calm            Negative unless noted in HPI     Medical History  Surgical History Family History Social History   Past Medical History:   Diagnosis Date     COPD (chronic obstructive pulmonary disease) (H)      Coronary artery disease due to lipid rich plaque 02/2021    Abnormal coronary CTA     Family history of myocardial infarction     mother under age 60's  CVA and MI     High cholesterol      Hypertension      PVC's (premature ventricular contractions)      Syncope 2012     Tubular adenoma of colon     Past Surgical History:   Procedure Laterality Date     APPENDECTOMY       SHOULDER SURGERY Right      TUBAL LIGATION      Family History   Problem Relation Age of Onset     Lung Cancer Father         lung     Breast Cancer Paternal Aunt       Cerebrovascular Disease Mother      Coronary Artery Disease Mother     Social History     Socioeconomic History     Marital status:      Spouse name: Not on file     Number of children: Not on file     Years of education: Not on file     Highest education level: Not on file   Occupational History     Not on file   Tobacco Use     Smoking status: Former Smoker     Types: Cigarettes     Quit date: 2019     Years since quitting: 3.6     Smokeless tobacco: Never Used   Substance and Sexual Activity     Alcohol use: Not on file     Drug use: Not on file     Sexual activity: Not on file   Other Topics Concern     Parent/sibling w/ CABG, MI or angioplasty before 65F 55M? Not Asked   Social History Narrative     Not on file     Social Determinants of Health     Financial Resource Strain: Not on file   Food Insecurity: Not on file   Transportation Needs: Not on file   Physical Activity: Not on file   Stress: Not on file   Social Connections: Not on file   Intimate Partner Violence: Not on file   Housing Stability: Not on file          Medications  Allergies   Current Outpatient Medications   Medication Sig Dispense Refill     acetaminophen (TYLENOL) 500 MG tablet Take 500-1,000 mg by mouth every 8 hours as needed        albuterol (PROAIR HFA/PROVENTIL HFA/VENTOLIN HFA) 108 (90 Base) MCG/ACT inhaler Inhale 2 puffs into the lungs       aspirin (ASA) 81 MG EC tablet Take 81 mg by mouth daily       atorvastatin (LIPITOR) 40 MG tablet Take 40 mg by mouth At Bedtime       Cholecalciferol 250 MCG (50730 UT) TABS Take 10,000 Units by mouth daily       docusate sodium (COLACE) 100 MG tablet Take 100 mg by mouth daily       famotidine (PEPCID) 20 MG tablet Take 1 tablet (20 mg) by mouth 2 times daily 180 tablet 3     fluticasone (FLONASE) 50 MCG/ACT nasal spray Spray 2 sprays in nostril as needed       fluticasone-salmeterol (ADVAIR) 250-50 MCG/ACT inhaler Inhale 1 puff into the lungs 2 times daily 180 each 1     HEMP OIL OR  EXTRACT OR OTHER CBD CANNABINOID, NOT MEDICAL CANNABIS, Uses prn       IBANdronate (BONIVA) 150 MG tablet Take 150 mg by mouth every 30 days       lisinopril (ZESTRIL) 5 MG tablet Take 2.5 mg by mouth daily       Multiple Vitamin (MULTIVITAMIN ADULT PO) Take 1 tablet by mouth       nicotine (NICORETTE) 2 MG gum Take 2 mg by mouth daily       nitroGLYcerin (NITRODUR) 0.2 MG/HR 24 hr patch Place 1 patch onto the skin daily 90 patch 3     nitroGLYcerin (NITROSTAT) 0.4 MG sublingual tablet For chest pain place 1 tablet under the tongue every 5 minutes for 3 doses. If symptoms persist 5 minutes after 1st dose call 911. 25 tablet 4     Omega-3 1000 MG capsule Take 1 g by mouth daily       Pine Bark, Pycnogenol, (PINE BARK, PINUS PINASTER, OR) Take 150 mg by mouth daily       tiotropium (SPIRIVA) 18 MCG inhaled capsule Inhale 18 mcg into the lungs daily       Turmeric 400 MG CAPS Take 800 mg by mouth daily      No Known Allergies      Lab Results    Chemistry/lipid CBC Cardiac Enzymes/BNP/TSH/INR   Lab Results   Component Value Date    CHOL 188 10/18/2021    HDL 84 10/18/2021    TRIG 67 10/18/2021    BUN 17 06/13/2022     06/13/2022    CO2 27 06/13/2022    Lab Results   Component Value Date    WBC 8.5 06/13/2022    HGB 12.1 07/18/2022    HCT 42.0 06/13/2022     (H) 06/13/2022     06/13/2022    Lab Results   Component Value Date    TROPONINI <0.01 08/09/2022    TSH 1.55 10/07/2019    INR 1.00 01/29/2021        40  minutes spent on the date of encounter doing chart review, review of test results, interpretation with above tests, patient visit, documentation and discussion with other provider.        This note has been dictated using voice recognition software. Any grammatical, typographical, or context distortions are unintentional and inherent to the software          Thank you for allowing me to participate in the care of your patient.      Sincerely,     XANDER Vicente [facility]  [facility]  cc:   Shivam Dneise MD  [address] [facility] [address]  [address]  [place] [address]

## 2022-10-01 ENCOUNTER — HEALTH MAINTENANCE LETTER (OUTPATIENT)
Age: 77
End: 2022-10-01

## 2022-10-10 ENCOUNTER — OFFICE VISIT (OUTPATIENT)
Dept: PULMONOLOGY | Facility: OTHER | Age: 77
End: 2022-10-10
Payer: MEDICARE

## 2022-10-10 VITALS
WEIGHT: 152.7 LBS | HEART RATE: 83 BPM | DIASTOLIC BLOOD PRESSURE: 70 MMHG | SYSTOLIC BLOOD PRESSURE: 120 MMHG | BODY MASS INDEX: 24.65 KG/M2 | OXYGEN SATURATION: 97 %

## 2022-10-10 DIAGNOSIS — R06.09 DYSPNEA ON EXERTION: ICD-10-CM

## 2022-10-10 DIAGNOSIS — J43.1 PANLOBULAR EMPHYSEMA (H): ICD-10-CM

## 2022-10-10 DIAGNOSIS — J44.9 CHRONIC OBSTRUCTIVE PULMONARY DISEASE, UNSPECIFIED COPD TYPE (H): Primary | ICD-10-CM

## 2022-10-10 PROCEDURE — 99214 OFFICE O/P EST MOD 30 MIN: CPT | Performed by: NURSE PRACTITIONER

## 2022-10-10 NOTE — PROGRESS NOTES
Pulmonary Medicine Consult   October 10, 2022    Assessment and Plan:   #1. Dyspnea on exertion -- minimal   #2. COPD GOLD B -- no flares since last visit   #3. Emphysema      CONTINUE Spiriva inhaler once daily    Started Audrey as Breo was too expensive. Suggested she try this once daily instead of twice to stretch the medication. She was instructed to closely monitor her symptoms and go back to twice daily if she notices changes in her breathing.     Once her 6 months of Spiriva is used we can try a triple therapy inhaler if she finds the addition of the ICS/LABA helpful.     CONTINUE Albuterol inhaler every 4 hours as needed for increased shortness of breath or chest tightness. You can use the spacer with this to help you get the medication into your lungs    Try and increase exercise as you are able     Not interested in pulmonary rehab at this time.    Flu Shot at PCP office next week. She is hesitant to get the COVID vaccine, we discussed the at length and I did highly recommend this. She will think about it.     Follow up a few months after starting Trelegy. She has 3-4 months of Spiriva left.       RTC 6 months    Maura Turner, JOSH  Pulmonary Medicine  [facility]   [phone]    ___________________________________________________________________    CC: Follow up COPD    HPI:   Shantel Romero is a 77 year old female with history of COPD, emphysema, CAD, HLD, HTN, and lung cancer diagnosed in 2019 s/p radiation that finished earlier this year presenting today for evaluation of dyspnea on exertion and emphysema. Taking Spiriva daily for years. Has not tried any other inhalers.    Reports intermittent LUKE that she believes to be related to anxiety. She notes Clonazepam helps the most. She has tried Albuterol with symptoms and it does not seem to help much, although she does not believe she is using it correctly. When demonstrating how she uses the inhaler it is noted that she was not depressing the  inhaler and was not in fact getting any medication.   Denies cough, no phlegm. Symptoms do not seem worse with activity but she does note that her activity tolerance is lower. She is able to go shopping and walk around her house if she paces herself mostly due to occasional dizziness with activity. Not very active, spends most of the day on the computer. She did not note any change in her symptoms after starting a ICS/LABA but denies her breathing stopping her from doing anything, she is happy with where her breathing is. Breo was too expensive so Wixela was started, this is also expensive and she is worried about affording this monthly. She already checked with her [place] pharmacy and the Trelegy is affordable for her, she just needs to get through the next few months so she can use her Spiriva up.   PND uses Flonase with some relief. Denies sinus pressure or congestion.   Still has intermittent chest pain and chest tightness. Followed by cardiology, uses nitroglycerin with relief.     She was previously seen at [place] [facility] by Dr. Jay Gonzalez. She had PFTs done and was evaluated when she was initially diagnosed with lung cancer in 2019. Those records were not available.      Patient supplied answers from flow sheet for:  COPD Assessment Test (CAT)  2009 [employer]. All rights reserved.  COPD assessment test (CAT) 8/8/2022 10/10/2022   Cough 0 5   Phlegm 0 2   Chest tightness 3 5   Walk up hill 5 3   Limited activities 5 3   Leaving my home 0 0   Sleep 3 0   Energy 5 4   Total Score 21 22        ROS:   10 point ROS negative unless otherwise noted in HPI    Physical Exam:  /70 (BP Location: Right arm)   Pulse 83   Wt 69.3 kg (152 lb 11.2 oz)   SpO2 97%   BMI 24.65 kg/m      Physical Exam  Constitutional:       General: She is not in acute distress.     Appearance: She is not ill-appearing or diaphoretic.   HENT:      Nose: Nose normal.   Cardiovascular:      Rate and Rhythm: Normal rate and regular rhythm.       Pulses: Normal pulses.      Heart sounds: Normal heart sounds.   Pulmonary:      Effort: Pulmonary effort is normal. No respiratory distress.      Breath sounds: Normal breath sounds. No wheezing or rhonchi.   Musculoskeletal:      Right lower leg: No edema.      Left lower leg: No edema.   Skin:     General: Skin is warm and dry.   Neurological:      Mental Status: She is alert.   Psychiatric:         Behavior: Behavior normal.       PMH:  Patient Active Problem List    Diagnosis Date Noted     Abnormal electrocardiogram 09/07/2022     Priority: Medium     Malignant neoplasm of upper lobe of right lung (H) 06/16/2021     Priority: Medium     Mass of upper lobe of right lung 02/08/2021     Priority: Medium     Tobacco abuse 02/01/2021     Priority: Medium     Hypertension, unspecified type      Priority: Medium     Dyspnea on exertion      Priority: Medium     Coronary artery disease due to lipid rich plaque      Priority: Medium     Chest pain 01/29/2021     Priority: Medium     Osteopenia      Priority: Medium     Created by Conversion  Replacement Utility updated for latest IMO load         Premature Menopause      Priority: Medium     Created by Conversion           PSH:  Past Surgical History:   Procedure Laterality Date     APPENDECTOMY       SHOULDER SURGERY Right      TUBAL LIGATION         Family HX: family history includes Breast Cancer in her paternal aunt; Cerebrovascular Disease in her mother; Coronary Artery Disease in her mother; Lung Cancer in her father.    Social Hx:  Social History     Socioeconomic History     Marital status:      Spouse name: Not on file     Number of children: Not on file     Years of education: Not on file     Highest education level: Not on file   Occupational History     Not on file   Tobacco Use     Smoking status: Former     Types: Cigarettes     Quit date: 2019     Years since quitting: 3.7     Smokeless tobacco: Never   Substance and Sexual Activity      Alcohol use: Not on file     Drug use: Not on file     Sexual activity: Not on file   Other Topics Concern     Parent/sibling w/ CABG, MI or angioplasty before 65F 55M? Not Asked   Social History Narrative     Not on file     Social Determinants of Health     Financial Resource Strain: Not on file   Food Insecurity: Not on file   Transportation Needs: Not on file   Physical Activity: Not on file   Stress: Not on file   Social Connections: Not on file   Intimate Partner Violence: Not on file   Housing Stability: Not on file         Current Meds:  Current Outpatient Medications   Medication Sig Dispense Refill     acetaminophen (TYLENOL) 500 MG tablet Take 500-1,000 mg by mouth every 8 hours as needed        albuterol (PROAIR HFA/PROVENTIL HFA/VENTOLIN HFA) 108 (90 Base) MCG/ACT inhaler Inhale 2 puffs into the lungs       aspirin (ASA) 81 MG EC tablet Take 81 mg by mouth daily       atorvastatin (LIPITOR) 40 MG tablet Take 40 mg by mouth At Bedtime       Cholecalciferol 250 MCG (25040 UT) TABS Take 10,000 Units by mouth daily       docusate sodium (COLACE) 100 MG tablet Take 100 mg by mouth daily       famotidine (PEPCID) 20 MG tablet Take 1 tablet (20 mg) by mouth 2 times daily 180 tablet 3     fluticasone (FLONASE) 50 MCG/ACT nasal spray Spray 2 sprays in nostril as needed       fluticasone-salmeterol (ADVAIR) 250-50 MCG/ACT inhaler Inhale 1 puff into the lungs 2 times daily 180 each 1     HEMP OIL OR EXTRACT OR OTHER CBD CANNABINOID, NOT MEDICAL CANNABIS, Uses prn       IBANdronate (BONIVA) 150 MG tablet Take 150 mg by mouth every 30 days       lisinopril (ZESTRIL) 5 MG tablet Take 2.5 mg by mouth daily       Multiple Vitamin (MULTIVITAMIN ADULT PO) Take 1 tablet by mouth       nicotine (NICORETTE) 2 MG gum Take 2 mg by mouth daily       nitroGLYcerin (NITRODUR) 0.2 MG/HR 24 hr patch Place 1 patch onto the skin daily 90 patch 3     nitroGLYcerin (NITROSTAT) 0.4 MG sublingual tablet For chest pain place 1 tablet  under the tongue every 5 minutes for 3 doses. If symptoms persist 5 minutes after 1st dose call 911. 25 tablet 4     Omega-3 1000 MG capsule Take 1 g by mouth daily       Pine Bark, Pycnogenol, (PINE BARK, PINUS PINASTER, OR) Take 150 mg by mouth daily       tiotropium (SPIRIVA) 18 MCG inhaled capsule Inhale 18 mcg into the lungs daily       Turmeric 400 MG CAPS Take 800 mg by mouth daily         Allergies:  No Known Allergies    Labs:   personally reviewed in the EMR.    Imaging studies: personally reviewed and interpreted. Below are the Radiology interpretations.      PFT's (7/8/2022): abnormal  FEV1/FVC is 60% and is reduced.  FEV1 is 1.13L (53%) predicted and is reduced  FVC is 1.88L (67%) predicted and is reduced  There was improvement in spirometry after a single inhaled dose of bronchodilator.  TLC is 4.77L (90%) predicted and is normal.  RV is 2.89L (126%) predicted and is normal.  DLCO is10.57ml/min/hg (52%) predicted and is reduced when it is corrected for hemoglobin.  Flow volume loops indicate scooping.    Impression:  Full Pulmonary Function Test is abnormal.  PFTs are consistent with moderate-severe obstructive disease.  Spirometry is consistent with reversibility.  There is no hyperinflation.  There is no air-trapping.  Diffusion capacity when corrected for hemoglobin is moderately reduced.      Chest CT 6/10/22:  IMPRESSION:  1.  Stable 14 x 10 mm nodule right upper lobe since 02/11/2022.  2.  New mild radiation pneumonitis around this nodule.  3.  No significant new findings.    Echo 1/30/2021:  Narrative & Impression    No previous study for comparison.    Normal left ventricular size. Mild left ventricular hypertrophy    Left ventricle ejection fraction is normal. The calculated left ventricular ejection fraction is 67%.    Possible mild right ventricular enlargement.    Normal right ventricular systolic function.    Mild right atrial enlargement.    Aortic valve sclerosis without evidence for  significant aortic stenosis. Mild aortic insufficiency.    Trace pericardial effusion suggested

## 2022-10-10 NOTE — PATIENT INSTRUCTIONS
-Try Wixela once daily instead of twice daily to stretch the medication until you run out of Spiriva.   - If you are interested in pulmonary rehab let me know and I will order a referral. This program may help with your activity tolerance.   - we will see you back a few months after you start the Trelegy.     If you have worsening symptoms, questions, or need to speak with the nurse please call [phone].

## 2022-10-20 ENCOUNTER — LAB REQUISITION (OUTPATIENT)
Dept: LAB | Facility: CLINIC | Age: 77
End: 2022-10-20
Payer: MEDICARE

## 2022-10-20 ENCOUNTER — TRANSFERRED RECORDS (OUTPATIENT)
Dept: HEALTH INFORMATION MANAGEMENT | Facility: CLINIC | Age: 77
End: 2022-10-20

## 2022-10-20 DIAGNOSIS — E78.00 PURE HYPERCHOLESTEROLEMIA, UNSPECIFIED: ICD-10-CM

## 2022-10-20 DIAGNOSIS — I10 ESSENTIAL (PRIMARY) HYPERTENSION: ICD-10-CM

## 2022-10-20 LAB
ANION GAP SERPL CALCULATED.3IONS-SCNC: 15 MMOL/L (ref 7–15)
BUN SERPL-MCNC: 13.9 MG/DL (ref 8–23)
CALCIUM SERPL-MCNC: 10.2 MG/DL (ref 8.8–10.2)
CHLORIDE SERPL-SCNC: 101 MMOL/L (ref 98–107)
CHOLEST SERPL-MCNC: 187 MG/DL
CREAT SERPL-MCNC: 0.78 MG/DL (ref 0.51–0.95)
DEPRECATED HCO3 PLAS-SCNC: 22 MMOL/L (ref 22–29)
GFR SERPL CREATININE-BSD FRML MDRD: 77 ML/MIN/1.73M2
GLUCOSE SERPL-MCNC: 85 MG/DL (ref 70–99)
HDLC SERPL-MCNC: 100 MG/DL
LDLC SERPL CALC-MCNC: 75 MG/DL
NONHDLC SERPL-MCNC: 87 MG/DL
PHQ9 SCORE: 3
POTASSIUM SERPL-SCNC: 4.5 MMOL/L (ref 3.4–5.3)
SODIUM SERPL-SCNC: 138 MMOL/L (ref 136–145)
TRIGL SERPL-MCNC: 61 MG/DL

## 2022-10-20 PROCEDURE — 80061 LIPID PANEL: CPT | Mod: ORL | Performed by: FAMILY MEDICINE

## 2022-10-20 PROCEDURE — 80048 BASIC METABOLIC PNL TOTAL CA: CPT | Mod: ORL | Performed by: FAMILY MEDICINE

## 2022-10-21 ENCOUNTER — HOSPITAL ENCOUNTER (OUTPATIENT)
Dept: CT IMAGING | Facility: CLINIC | Age: 77
Discharge: HOME | End: 2022-10-21
Attending: INTERNAL MEDICINE | Admitting: INTERNAL MEDICINE
Payer: MEDICARE

## 2022-10-21 DIAGNOSIS — J43.1 PANLOBULAR EMPHYSEMA (H): ICD-10-CM

## 2022-10-21 PROCEDURE — 71250 CT THORAX DX C-: CPT

## 2022-10-24 ENCOUNTER — ONCOLOGY VISIT (OUTPATIENT)
Dept: ONCOLOGY | Facility: CLINIC | Age: 77
End: 2022-10-24
Attending: INTERNAL MEDICINE
Payer: MEDICARE

## 2022-10-24 VITALS
DIASTOLIC BLOOD PRESSURE: 83 MMHG | BODY MASS INDEX: 24.81 KG/M2 | SYSTOLIC BLOOD PRESSURE: 142 MMHG | WEIGHT: 154.4 LBS | HEIGHT: 66 IN | HEART RATE: 82 BPM | OXYGEN SATURATION: 95 %

## 2022-10-24 DIAGNOSIS — C34.11 MALIGNANT NEOPLASM OF UPPER LOBE OF RIGHT LUNG (H): Primary | ICD-10-CM

## 2022-10-24 PROCEDURE — 99214 OFFICE O/P EST MOD 30 MIN: CPT | Performed by: INTERNAL MEDICINE

## 2022-10-24 PROCEDURE — G0463 HOSPITAL OUTPT CLINIC VISIT: HCPCS

## 2022-10-24 ASSESSMENT — PAIN SCALES - GENERAL: PAINLEVEL: NO PAIN (0)

## 2022-10-24 NOTE — PROGRESS NOTES
"Oncology Rooming Note    October 24, 2022 2:39 PM   Shantel Romero is a 77 year old female who presents for:    Chief Complaint   Patient presents with     Oncology Clinic Visit     Malignant neoplasm of upper lobe of right lung     Initial Vitals: BP (!) 142/83   Pulse 82   Ht 1.676 m (5' 5.98\")   Wt 70 kg (154 lb 6.4 oz)   SpO2 95%   BMI 24.93 kg/m   Estimated body mass index is 24.93 kg/m  as calculated from the following:    Height as of this encounter: 1.676 m (5' 5.98\").    Weight as of this encounter: 70 kg (154 lb 6.4 oz). Body surface area is 1.81 meters squared.  No Pain (0) Comment: Data Unavailable   No LMP recorded.  Allergies reviewed: Yes  Medications reviewed: Yes    Medications: Medication refills not needed today.  Pharmacy [facility] entered into [employer]: [id number] - [place] [address] AT [facility] [address]    Clinical concerns: follow up      Vandana Andrea MA            "

## 2022-10-24 NOTE — Clinical Note
"    10/24/2022         RE: Shantel Romero  [address]  [place] [address]        Dear Colleague,    Thank you for referring your patient, Shantel Romero, to the [facility]. Please see a copy of my visit note below.    Oncology Rooming Note    October 24, 2022 2:39 PM   Shantel Romero is a 77 year old female who presents for:    Chief Complaint   Patient presents with     Oncology Clinic Visit     Malignant neoplasm of upper lobe of right lung     Initial Vitals: BP (!) 142/83   Pulse 82   Ht 1.676 m (5' 5.98\")   Wt 70 kg (154 lb 6.4 oz)   SpO2 95%   BMI 24.93 kg/m   Estimated body mass index is 24.93 kg/m  as calculated from the following:    Height as of this encounter: 1.676 m (5' 5.98\").    Weight as of this encounter: 70 kg (154 lb 6.4 oz). Body surface area is 1.81 meters squared.  No Pain (0) Comment: Data Unavailable   No LMP recorded.  Allergies reviewed: Yes  Medications reviewed: Yes    Medications: Medication refills not needed today.  Pharmacy name entered into [employer]: [id number] - [place] [address] AT [facility] [address]    Clinical concerns: follow up      Vandana Andrea MA                Again, thank you for allowing me to participate in the care of your patient.        Sincerely,        Timoteo Barnhart MD  "

## 2023-01-09 NOTE — PROGRESS NOTES
[facility] Hematology and Oncology Progress Note    Patient: Shantel Romero  MRN: [id number]  Date of Service: Oct 24, 2022        Assessment and Plan:    1.  Right-sided lung cancer: She had a CT scan performed on October 21.  I personally reviewed the images and shared them with Bj.  These images show stable right upper lobe nodule and a stable right lower lobe nodule.  The right upper lobe nodule measures 9 x 12 mm in the lower nodule measures 5.6 mm.  We will continue to follow with repeat imaging in 4 months.  She had radiosurgery to the right upper lobe nodule.    ECOG Performance  1    Diagnosis:    1.  Right upper lobe lung mass: Initially seen in 2019. Has not been biopsied.    Treatment:    Stereotactic radiosurgery: Right upper lobe nodule.  5400 cGy given in 3 fractions. Completed July 9, 2021.    Interim History:    Cosme returns today for follow-up visit.  She was last seen about 4 months ago.  No acute complaints today.  No chest pain, worsening cough, worsening shortness of breath.     Review of Systems:    As above in the history.     Review of Systems otherwise Negative for:  General: chills, fever or night sweats  Psychological: anxiety or depression  Ophthalmic: blurry vision, double vision or loss of vision, vision change  ENT: epistaxis, oral lesions, hearing changes  Hematological and Lymphatic: bleeding, bruising, jaundice, swollen lymph nodes  Endocrine: hot flashes, unexpected weight changes  Respiratory: cough, hemoptysis, orthopnea or shortness of breath/LUKE  Cardiovascular: chest pain, edema, palpitations or PND  Gastrointestinal: abdominal pain, blood in stools, change in bowel habits, constipation, diarrhea or nausea/vomiting  Genito-Urinary: change in urinary stream, incontinence, frequency/urgency  Musculoskeletal: joint stiffness, swelling, muscle pain  Neurological: dizziness, headaches, numbness/tingling  Dermatological: lumps and rash    Past History:    Past Medical  "History:   Diagnosis Date     COPD (chronic obstructive pulmonary disease) (H)      Coronary artery disease due to lipid rich plaque 02/2021    Abnormal coronary CTA     Family history of myocardial infarction     mother under age 60's  CVA and MI     High cholesterol      Hypertension      PVC's (premature ventricular contractions)      Syncope 2012     Tubular adenoma of colon      Physical Exam:    BP (!) 142/83   Pulse 82   Ht 1.676 m (5' 5.98\")   Wt 70 kg (154 lb 6.4 oz)   SpO2 95%   BMI 24.93 kg/m      General: patient appears stated age of 76 year old. Nontoxic and in no distress.   HEENT: Head: atraumatic, normocephalic. Sclerae anicteric.  Chest:  Normal respiratory effort  Cardiac:  No edema.   Abdomen: abdomen is non-distended  Extremities: normal tone and muscle bulk.  Skin: no lesions or rash on visible skin. Warm and dry.   CNS: alert and oriented. Grossly non-focal.   Psychiatric: normal mood and affect.     Lab Results:    No results found for this or any previous visit (from the past 168 hour(s)).     Imaging:    No results found.      Signed by: Timoteo Barnhart MD    "

## 2023-01-30 ENCOUNTER — OFFICE VISIT (OUTPATIENT)
Dept: CARDIOLOGY | Facility: CLINIC | Age: 78
End: 2023-01-30
Attending: NURSE PRACTITIONER
Payer: MEDICARE

## 2023-01-30 VITALS
HEIGHT: 66 IN | OXYGEN SATURATION: 97 % | DIASTOLIC BLOOD PRESSURE: 75 MMHG | WEIGHT: 152 LBS | SYSTOLIC BLOOD PRESSURE: 122 MMHG | BODY MASS INDEX: 24.43 KG/M2 | HEART RATE: 77 BPM

## 2023-01-30 DIAGNOSIS — I10 PRIMARY HYPERTENSION: ICD-10-CM

## 2023-01-30 DIAGNOSIS — R06.09 DYSPNEA ON EXERTION: ICD-10-CM

## 2023-01-30 DIAGNOSIS — R10.13 DYSPEPSIA: ICD-10-CM

## 2023-01-30 DIAGNOSIS — I25.10 CORONARY ARTERY DISEASE DUE TO LIPID RICH PLAQUE: ICD-10-CM

## 2023-01-30 DIAGNOSIS — R94.31 ABNORMAL ELECTROCARDIOGRAM: ICD-10-CM

## 2023-01-30 DIAGNOSIS — I25.83 CORONARY ARTERY DISEASE DUE TO LIPID RICH PLAQUE: ICD-10-CM

## 2023-01-30 PROCEDURE — 99214 OFFICE O/P EST MOD 30 MIN: CPT | Performed by: INTERNAL MEDICINE

## 2023-01-30 RX ORDER — NITROGLYCERIN 40 MG/1
1 PATCH TRANSDERMAL DAILY
Qty: 90 PATCH | Refills: 3 | Status: SHIPPED | OUTPATIENT
Start: 2023-01-30 | End: 2024-02-01

## 2023-01-30 NOTE — PROGRESS NOTES
HEART CARE ENCOUNTER CONSULTATON NOTE      [facility]  [phone]      Assessment/Recommendations   Assessment/Plan:  1.  Coronary artery disease.  She is not describing ongoing complaints of anginal chest discomfort and has done well on low-dose nitroglycerin patch 0.2 mg.  She has not had to utilize sublingual nitroglycerin.  As noted she had a prior CT angiogram that demonstrated significant elevated coronary calcium score but nonobstructive coronary artery disease.  This was completed every 2021 with a nuclear stress test completed August 2022 that was negative for ischemia.  She will continue with the current medication regimen and monitor for symptoms.    2.  Epigastric discomfort much improved with Pepcid which I initiated previous visit.  She continues on 20 mg twice daily and discussion with our pharmacist it was felt reasonable that she could stay on this dose as long as renal function stays adequate.  Would like to defer to her primary care physician with respect to follow-up with GI related symptoms.    3.  Hypertension.  Blood pressure appears to be under good control.  As noted previously lisinopril dose was decreased to 2.5 mg daily because of some woozy sensation.    4.  Patient reports some occasional awareness of a panic sensation when she goes to bed at night.  She feels short duration of her heart perhaps racing but was not certain.  She has worn a previous Holter monitor in March 2021 that demonstrated mild increase in atrial ectopy no sustained atrial arrhythmias.  No sustained ventricular arrhythmias.  We talked about an event monitor but she wanted to wait and further discern the frequency of the symptoms and keep me updated.    5.  Dyslipidemia.  Ideally would like to see her LDL less than 70.  In October through her primary care provider her LDL cholesterol was 75.  We talked about follow-up lipid panel which she would like to do when she comes for CT scan to  follow-up on a lung mass in February.  She is describing some quadricep discomfort which I indicated could potentially be musculoskeletal from the statin.  We talked about a 1 week statin holiday but she wanted to hold and reevaluate pending the blood work.  She is asked to update me if the symptoms are worsening or progressive.  Previous AST and ALT were normal June 2022.      Plan: As outlined above.  Follow-up in 6 months.         History of Present Illness/Subjective    HPI: Shantel Romero is a 77 year old female who is seen in follow-up.  She reports that she has been feeling very well.  She states that the epigastric discomfort has been persistently improved when famotidine was added to her medication regimen when we last visited.  She does not report any ongoing complaints of angina.  She does report some occasional awareness of her heart racing when she first goes to bed at night and wonders if it is a panic event although I did talk with her that it certainly could be a heart rhythm event and we talked about whether she would be interested in utilizing a event monitor but she wanted to wait pending further monitoring of the symptoms.  She also endorses some musculoskeletal leg discomfort which she is noted over the past few months.  Her lisinopril dose have been cut to 2.5 mg daily secondary to some trend towards lower blood pressure.    Contains abnormal data Echocardiogram Complete  Order: [id number]   Status: Final result      Visible to patient: Yes (not seen)      Next appt: 02/17/2023 at 01:20 PM in Radiology. ([facility] CT Rm 2)      0 Result Notes  Details    Reading Physician Reading Date Result Priority   Shivam Denise MD  [phone] 1/30/2021 Routine   Provider, Historical 1/30/2021      Narrative & Impression    No previous study for comparison.    Normal left ventricular size. Mild left ventricular hypertrophy    Left ventricle ejection fraction is normal. The calculated left  ventricular ejection fraction is 67%.    Possible mild right ventricular enlargement.    Normal right ventricular systolic function.    Mild right atrial enlargement.    Aortic valve sclerosis without evidence for significant aortic stenosis. Mild aortic insufficiency.    Trace pericardial effusion suggested          NM Lexiscan stress test  Order: [id number]   Status: Final result      Visible to patient: Yes (seen)      Next appt: 02/17/2023 at 01:20 PM in Radiology. ([facility] CT Rm 2)      Dx: Coronary artery disease due to lipid ...      2 Result Notes     1 Patient Communication  Details    Reading Physician Reading Date Result Priority   Jadon Harrington DO  [phone] 8/18/2022 Routine   Jadon Harrington DO  [phone] 8/18/2022      Result Text        The nuclear stress test is negative for inducible myocardial ischemia or infarction. The left ventricular ejection fraction at rest is 75%. Left ventricular function is normal.     A prior study was conducted on 5/23/2017.       Exam Date Exam Time Exam Date Exam Time Accession # Performing Department Results    2/1/21 12:00 AM 2/1/21 12:01 PM [id number] [facility] CT        [id number]       PACS Images     Show images for CTA Angiogram coronary artery     Study Result    Narrative & Impression     The total Agatston calcium score is 507. A calcium score in this range places the individual in the 100th percentile when compared to an age and gender matched control group and implies a very high risk of cardiac events in the next ten years.    Right dominant coronary artery system.    Mild nonobstructive coronary artery disease in the left main, left anterior descending artery and circumflex artery.           Reading Physician Reading Date Result Priority   Jadon Harrington DO  [phone] 8/18/2022 Routine   Jadon Harrington DO  [phone] 8/18/2022      Result Text        The nuclear  stress test is negative for inducible myocardial ischemia or infarction. The left ventricular ejection fraction at rest is 75%. Left ventricular function is normal.     A prior study was conducted on 5/23/2017.     Sinus rhythm   T wave abnormality, consider anterior ischemia   Abnormal ECG   When compared with ECG of 29-JAN-2021 12:41,   Premature ventricular complexes are no longer Present   T wave inversion now evident in Anterior leads   Confirmed by PATY HOLDEN MD LOC:JN ([id number]) on 8/10/       Results:    Indication for study: Coronary artery disease    The predominant rhythm throughout the tracing was normal sinus rhythm with an average heart rate of 83 bpm.  The chronotropic response to activities of daily living appears to be normal.  The VT interval was normal.  The QRS duration was normal.  The   QT interval was normal.  The study demonstrated no significant bradycardia/pauses.    The patient demonstrated atrial ectopy which was mildly increased at 1.1%.  Nonsustained ectopic atrial tachycardia was not observed.  Sustained ectopic atrial tachycardia, atrial fibrillation, or other supraventricular tachycardia was not observed.    The patient demonstrated rare ventricular ectopy.  Complex ventricular ectopy was  not observed.  Sustained ventricular tachycardia was not observed.    The patient did return a diary.  Patient reported symptoms of chest pain and dizziness on 2 occasions.  All 3 recordings demonstrated sinus rhythm after sinus tachycardia with heart rates ranging between 89 and 104 bpm.  Each of the recordings   demonstrated a single isolated PVC with no other ectopy or pathologic rhythm disturbance.     Impression:    Borderline Holter monitor with evidence of mildly increased atrial ectopy.  There was no sustained atrial arrhythmia.    Indication for study: Coronary artery disease.  Patient's symptoms of chest pain corresponded to mild sinus tachycardia with a isolated PVC.    Dizziness  corresponded to sinus rhythm and a mild sinus tachycardia.  It is the reader's impression that the symptoms are out of proportion to the degree of ectopy.    No sustained atrial or ventricular tachyarrhythmia.    No profound bradycardia or pauses    Physical Examination  Review of Systems   Vitals: There were no vitals taken for this visit.  BMI= There is no height or weight on file to calculate BMI.  Wt Readings from Last 3 Encounters:   10/24/22 70 kg (154 lb 6.4 oz)   10/10/22 69.3 kg (152 lb 11.2 oz)   09/07/22 68.9 kg (152 lb)       General Appearance:   no distress, normal body habitus   ENT/Mouth:  Wearing a facemask   EYES:  no scleral icterus, normal conjunctivae   Neck: no carotid bruits or thyromegaly   Chest/Lungs:   lungs are clear to auscultation, no rales or wheezing,  equal chest wall expansion    Cardiovascular:   Regular. Normal first and second heart sounds with no murmurs, rubs, or gallops; the carotid, radial and posterior tibial pulses are intact, Jugular venous pressure within normal limits, no significant edema bilaterally    Abdomen:  no , bruits, or tenderness; bowel sounds are present   Extremities: no cyanosis or clubbing   Skin: no xanthelasma, warm.    Neurologic: , no tremors     Psychiatric: alert and oriented x3, calm        Please refer above for cardiac ROS details.        Medical History  Surgical History Family History Social History   Past Medical History:   Diagnosis Date     COPD (chronic obstructive pulmonary disease) (H)      Coronary artery disease due to lipid rich plaque 02/2021    Abnormal coronary CTA     Family history of myocardial infarction     mother under age 60's  CVA and MI     High cholesterol      Hypertension      PVC's (premature ventricular contractions)      Syncope 2012     Tubular adenoma of colon      Past Surgical History:   Procedure Laterality Date     APPENDECTOMY       SHOULDER SURGERY Right      TUBAL LIGATION       Family History   Problem  Relation Age of Onset     Lung Cancer Father         lung     Breast Cancer Paternal Aunt      Cerebrovascular Disease Mother      Coronary Artery Disease Mother         Social History     Socioeconomic History     Marital status:      Spouse name: Not on file     Number of children: Not on file     Years of education: Not on file     Highest education level: Not on file   Occupational History     Not on file   Tobacco Use     Smoking status: Former     Types: Cigarettes     Quit date:      Years since quittin.0     Smokeless tobacco: Never   Substance and Sexual Activity     Alcohol use: Not on file     Drug use: Not on file     Sexual activity: Not on file   Other Topics Concern     Parent/sibling w/ CABG, MI or angioplasty before 65F 55M? Not Asked   Social History Narrative     Not on file     Social Determinants of Health     Financial Resource Strain: Not on file   Food Insecurity: Not on file   Transportation Needs: Not on file   Physical Activity: Not on file   Stress: Not on file   Social Connections: Not on file   Intimate Partner Violence: Not on file   Housing Stability: Not on file           Medications  Allergies   Current Outpatient Medications   Medication Sig Dispense Refill     acetaminophen (TYLENOL) 500 MG tablet Take 500-1,000 mg by mouth every 8 hours as needed        albuterol (PROAIR HFA/PROVENTIL HFA/VENTOLIN HFA) 108 (90 Base) MCG/ACT inhaler Inhale 2 puffs into the lungs       aspirin (ASA) 81 MG EC tablet Take 81 mg by mouth daily       atorvastatin (LIPITOR) 40 MG tablet Take 40 mg by mouth At Bedtime       Cholecalciferol 250 MCG (94247 UT) TABS Take 10,000 Units by mouth daily       docusate sodium (COLACE) 100 MG tablet Take 100 mg by mouth daily (Patient not taking: Reported on 10/24/2022)       famotidine (PEPCID) 20 MG tablet Take 1 tablet (20 mg) by mouth 2 times daily 180 tablet 3     fluticasone (FLONASE) 50 MCG/ACT nasal spray Spray 2 sprays in nostril as needed        fluticasone-salmeterol (ADVAIR) 250-50 MCG/ACT inhaler Inhale 1 puff into the lungs 2 times daily 180 each 1     HEMP OIL OR EXTRACT OR OTHER CBD CANNABINOID, NOT MEDICAL CANNABIS, Uses prn       IBANdronate (BONIVA) 150 MG tablet Take 150 mg by mouth every 30 days       lisinopril (ZESTRIL) 5 MG tablet Take 2.5 mg by mouth daily       melatonin 3 MG CAPS        Multiple Vitamin (MULTIVITAMIN ADULT PO) Take 1 tablet by mouth       nicotine (NICORETTE) 2 MG gum Take 2 mg by mouth daily       nitroGLYcerin (NITRODUR) 0.2 MG/HR 24 hr patch Place 1 patch onto the skin daily 90 patch 3     nitroGLYcerin (NITROSTAT) 0.4 MG sublingual tablet For chest pain place 1 tablet under the tongue every 5 minutes for 3 doses. If symptoms persist 5 minutes after 1st dose call 911. 25 tablet 4     Omega-3 1000 MG capsule Take 1 g by mouth daily       Pine Bark, Pycnogenol, (PINE BARK, PINUS PINASTER, OR) Take 150 mg by mouth daily       tiotropium (SPIRIVA) 18 MCG inhaled capsule Inhale 18 mcg into the lungs daily       Turmeric 400 MG CAPS Take 800 mg by mouth daily       UNABLE TO FIND Take by mouth daily Delta 8       No Known Allergies       Lab Results    Chemistry/lipid CBC Cardiac Enzymes/BNP/TSH/INR   Recent Labs   Lab Test 10/20/22  1342   CHOL 187      LDL 75   TRIG 61     Recent Labs   Lab Test 10/20/22  1342 02/04/22  1327 10/18/21  1329   LDL 75 75 91     Recent Labs   Lab Test 10/20/22  1342      POTASSIUM 4.5   CHLORIDE 101   CO2 22   GLC 85   BUN 13.9   CR 0.78   GFRESTIMATED 77   TRACE 10.2     Recent Labs   Lab Test 10/20/22  1342 06/13/22  1324 06/10/22  1311   CR 0.78 0.76 0.7     No results for input(s): A1C in the last 75190 hours.       Recent Labs   Lab Test 07/18/22  1146 06/13/22  1324   WBC  --  8.5   HGB 12.1 13.4   HCT  --  42.0   MCV  --  103*   PLT  --  273     Recent Labs   Lab Test 07/18/22  1146 06/13/22  1324 06/07/21  1309   HGB 12.1 13.4 13.3    Recent Labs   Lab Test  08/09/22  1430 01/30/21  0602 01/30/21  0023   TROPONINI <0.01 <0.01 <0.01     No results for input(s): BNP, NTBNPI, NTBNP in the last 79482 hours.  Recent Labs   Lab Test 10/07/19  1602   TSH 1.55     Recent Labs   Lab Test 01/29/21  1307   INR 1.00        Shivam Denise MD

## 2023-01-30 NOTE — LETTER
1/30/2023    LUCRETIA ROSARIO MD  [address]  [facility] [address]    RE: Shantel RAMSAY Nick       Dear Colleague,     I had the pleasure of seeing Shantel Lopesflip in the [facility].    HEART CARE ENCOUNTER CONSULTATON NOTE      [facility]  [phone]      Assessment/Recommendations   Assessment/Plan:  1.  Coronary artery disease.  She is not describing ongoing complaints of anginal chest discomfort and has done well on low-dose nitroglycerin patch 0.2 mg.  She has not had to utilize sublingual nitroglycerin.  As noted she had a prior CT angiogram that demonstrated significant elevated coronary calcium score but nonobstructive coronary artery disease.  This was completed every 2021 with a nuclear stress test completed August 2022 that was negative for ischemia.  She will continue with the current medication regimen and monitor for symptoms.    2.  Epigastric discomfort much improved with Pepcid which I initiated previous visit.  She continues on 20 mg twice daily and discussion with our pharmacist it was felt reasonable that she could stay on this dose as long as renal function stays adequate.  Would like to defer to her primary care physician with respect to follow-up with GI related symptoms.    3.  Hypertension.  Blood pressure appears to be under good control.  As noted previously lisinopril dose was decreased to 2.5 mg daily because of some woozy sensation.    4.  Patient reports some occasional awareness of a panic sensation when she goes to bed at night.  She feels short duration of her heart perhaps racing but was not certain.  She has worn a previous Holter monitor in March 2021 that demonstrated mild increase in atrial ectopy no sustained atrial arrhythmias.  No sustained ventricular arrhythmias.  We talked about an event monitor but she wanted to wait and further discern the frequency of the symptoms and keep me updated.    5.  Dyslipidemia.  Ideally would  like to see her LDL less than 70.  In October through her primary care provider her LDL cholesterol was 75.  We talked about follow-up lipid panel which she would like to do when she comes for CT scan to follow-up on a lung mass in February.  She is describing some quadricep discomfort which I indicated could potentially be musculoskeletal from the statin.  We talked about a 1 week statin holiday but she wanted to hold and reevaluate pending the blood work.  She is asked to update me if the symptoms are worsening or progressive.  Previous AST and ALT were normal June 2022.      Plan: As outlined above.  Follow-up in 6 months.         History of Present Illness/Subjective    HPI: Shantel Romero is a 77 year old female who is seen in follow-up.  She reports that she has been feeling very well.  She states that the epigastric discomfort has been persistently improved when famotidine was added to her medication regimen when we last visited.  She does not report any ongoing complaints of angina.  She does report some occasional awareness of her heart racing when she first goes to bed at night and wonders if it is a panic event although I did talk with her that it certainly could be a heart rhythm event and we talked about whether she would be interested in utilizing a event monitor but she wanted to wait pending further monitoring of the symptoms.  She also endorses some musculoskeletal leg discomfort which she is noted over the past few months.  Her lisinopril dose have been cut to 2.5 mg daily secondary to some trend towards lower blood pressure.    Contains abnormal data Echocardiogram Complete  Order: [id number]   Status: Final result      Visible to patient: Yes (not seen)      Next appt: 02/17/2023 at 01:20 PM in Radiology. ([facility] CT Rm 2)      0 Result Notes  Details    Reading Physician Reading Date Result Priority   Shivam Denise MD  [phone] 1/30/2021 Routine   Provider, Historical 1/30/2021       Narrative & Impression    No previous study for comparison.    Normal left ventricular size. Mild left ventricular hypertrophy    Left ventricle ejection fraction is normal. The calculated left ventricular ejection fraction is 67%.    Possible mild right ventricular enlargement.    Normal right ventricular systolic function.    Mild right atrial enlargement.    Aortic valve sclerosis without evidence for significant aortic stenosis. Mild aortic insufficiency.    Trace pericardial effusion suggested          NM Lexiscan stress test  Order: [id number]   Status: Final result      Visible to patient: Yes (seen)      Next appt: 02/17/2023 at 01:20 PM in Radiology. ([facility] Rm 2)      Dx: Coronary artery disease due to lipid ...      2 Result Notes     1 Patient Communication  Details    Reading Physician Reading Date Result Priority   Jadon Harrington DO  [phone] 8/18/2022 Routine   Jadon Harrington DO  [phone] 8/18/2022      Result Text        The nuclear stress test is negative for inducible myocardial ischemia or infarction. The left ventricular ejection fraction at rest is 75%. Left ventricular function is normal.     A prior study was conducted on 5/23/2017.       Exam Date Exam Time Exam Date Exam Time Accession # Performing Department Results    2/1/21 12:00 AM 2/1/21 12:01 PM [id number] [facility] CT        [id number]       PACS Images     Show images for CTA Angiogram coronary artery     Study Result    Narrative & Impression     The total Agatston calcium score is 507. A calcium score in this range places the individual in the 100th percentile when compared to an age and gender matched control group and implies a very high risk of cardiac events in the next ten years.    Right dominant coronary artery system.    Mild nonobstructive coronary artery disease in the left main, left anterior descending artery and circumflex artery.            Reading Physician Reading Date Result Priority   JuanyJadon daigle,   [phone] 8/18/2022 Routine   JuanyJadon,   [phone] 8/18/2022      Result Text        The nuclear stress test is negative for inducible myocardial ischemia or infarction. The left ventricular ejection fraction at rest is 75%. Left ventricular function is normal.     A prior study was conducted on 5/23/2017.     Sinus rhythm   T wave abnormality, consider anterior ischemia   Abnormal ECG   When compared with ECG of 29-JAN-2021 12:41,   Premature ventricular complexes are no longer Present   T wave inversion now evident in Anterior leads   Confirmed by PATY HOLDEN MD LOC:JN ([id number]) on 8/10/       Results:    Indication for study: Coronary artery disease    The predominant rhythm throughout the tracing was normal sinus rhythm with an average heart rate of 83 bpm.  The chronotropic response to activities of daily living appears to be normal.  The CO interval was normal.  The QRS duration was normal.  The   QT interval was normal.  The study demonstrated no significant bradycardia/pauses.    The patient demonstrated atrial ectopy which was mildly increased at 1.1%.  Nonsustained ectopic atrial tachycardia was not observed.  Sustained ectopic atrial tachycardia, atrial fibrillation, or other supraventricular tachycardia was not observed.    The patient demonstrated rare ventricular ectopy.  Complex ventricular ectopy was  not observed.  Sustained ventricular tachycardia was not observed.    The patient did return a diary.  Patient reported symptoms of chest pain and dizziness on 2 occasions.  All 3 recordings demonstrated sinus rhythm after sinus tachycardia with heart rates ranging between 89 and 104 bpm.  Each of the recordings   demonstrated a single isolated PVC with no other ectopy or pathologic rhythm disturbance.     Impression:    Borderline Holter monitor with evidence of mildly increased atrial ectopy.   There was no sustained atrial arrhythmia.    Indication for study: Coronary artery disease.  Patient's symptoms of chest pain corresponded to mild sinus tachycardia with a isolated PVC.    Dizziness corresponded to sinus rhythm and a mild sinus tachycardia.  It is the reader's impression that the symptoms are out of proportion to the degree of ectopy.    No sustained atrial or ventricular tachyarrhythmia.    No profound bradycardia or pauses    Physical Examination  Review of Systems   Vitals: There were no vitals taken for this visit.  BMI= There is no height or weight on file to calculate BMI.  Wt Readings from Last 3 Encounters:   10/24/22 70 kg (154 lb 6.4 oz)   10/10/22 69.3 kg (152 lb 11.2 oz)   09/07/22 68.9 kg (152 lb)       General Appearance:   no distress, normal body habitus   ENT/Mouth:  Wearing a facemask   EYES:  no scleral icterus, normal conjunctivae   Neck: no carotid bruits or thyromegaly   Chest/Lungs:   lungs are clear to auscultation, no rales or wheezing,  equal chest wall expansion    Cardiovascular:   Regular. Normal first and second heart sounds with no murmurs, rubs, or gallops; the carotid, radial and posterior tibial pulses are intact, Jugular venous pressure within normal limits, no significant edema bilaterally    Abdomen:  no , bruits, or tenderness; bowel sounds are present   Extremities: no cyanosis or clubbing   Skin: no xanthelasma, warm.    Neurologic: , no tremors     Psychiatric: alert and oriented x3, calm        Please refer above for cardiac ROS details.        Medical History  Surgical History Family History Social History   Past Medical History:   Diagnosis Date     COPD (chronic obstructive pulmonary disease) (H)      Coronary artery disease due to lipid rich plaque 02/2021    Abnormal coronary CTA     Family history of myocardial infarction     mother under age 60's  CVA and MI     High cholesterol      Hypertension      PVC's (premature ventricular contractions)       Syncope 2012     Tubular adenoma of colon      Past Surgical History:   Procedure Laterality Date     APPENDECTOMY       SHOULDER SURGERY Right      TUBAL LIGATION       Family History   Problem Relation Age of Onset     Lung Cancer Father         lung     Breast Cancer Paternal Aunt      Cerebrovascular Disease Mother      Coronary Artery Disease Mother         Social History     Socioeconomic History     Marital status:      Spouse name: Not on file     Number of children: Not on file     Years of education: Not on file     Highest education level: Not on file   Occupational History     Not on file   Tobacco Use     Smoking status: Former     Types: Cigarettes     Quit date: 2019     Years since quittin.0     Smokeless tobacco: Never   Substance and Sexual Activity     Alcohol use: Not on file     Drug use: Not on file     Sexual activity: Not on file   Other Topics Concern     Parent/sibling w/ CABG, MI or angioplasty before 65F 55M? Not Asked   Social History Narrative     Not on file     Social Determinants of Health     Financial Resource Strain: Not on file   Food Insecurity: Not on file   Transportation Needs: Not on file   Physical Activity: Not on file   Stress: Not on file   Social Connections: Not on file   Intimate Partner Violence: Not on file   Housing Stability: Not on file           Medications  Allergies   Current Outpatient Medications   Medication Sig Dispense Refill     acetaminophen (TYLENOL) 500 MG tablet Take 500-1,000 mg by mouth every 8 hours as needed        albuterol (PROAIR HFA/PROVENTIL HFA/VENTOLIN HFA) 108 (90 Base) MCG/ACT inhaler Inhale 2 puffs into the lungs       aspirin (ASA) 81 MG EC tablet Take 81 mg by mouth daily       atorvastatin (LIPITOR) 40 MG tablet Take 40 mg by mouth At Bedtime       Cholecalciferol 250 MCG (96050 UT) TABS Take 10,000 Units by mouth daily       docusate sodium (COLACE) 100 MG tablet Take 100 mg by mouth daily (Patient not taking: Reported  on 10/24/2022)       famotidine (PEPCID) 20 MG tablet Take 1 tablet (20 mg) by mouth 2 times daily 180 tablet 3     fluticasone (FLONASE) 50 MCG/ACT nasal spray Spray 2 sprays in nostril as needed       fluticasone-salmeterol (ADVAIR) 250-50 MCG/ACT inhaler Inhale 1 puff into the lungs 2 times daily 180 each 1     HEMP OIL OR EXTRACT OR OTHER CBD CANNABINOID, NOT MEDICAL CANNABIS, Uses prn       IBANdronate (BONIVA) 150 MG tablet Take 150 mg by mouth every 30 days       lisinopril (ZESTRIL) 5 MG tablet Take 2.5 mg by mouth daily       melatonin 3 MG CAPS        Multiple Vitamin (MULTIVITAMIN ADULT PO) Take 1 tablet by mouth       nicotine (NICORETTE) 2 MG gum Take 2 mg by mouth daily       nitroGLYcerin (NITRODUR) 0.2 MG/HR 24 hr patch Place 1 patch onto the skin daily 90 patch 3     nitroGLYcerin (NITROSTAT) 0.4 MG sublingual tablet For chest pain place 1 tablet under the tongue every 5 minutes for 3 doses. If symptoms persist 5 minutes after 1st dose call 911. 25 tablet 4     Omega-3 1000 MG capsule Take 1 g by mouth daily       Pine Bark, Pycnogenol, (PINE BARK, PINUS PINASTER, OR) Take 150 mg by mouth daily       tiotropium (SPIRIVA) 18 MCG inhaled capsule Inhale 18 mcg into the lungs daily       Turmeric 400 MG CAPS Take 800 mg by mouth daily       UNABLE TO FIND Take by mouth daily Delta 8       No Known Allergies       Lab Results    Chemistry/lipid CBC Cardiac Enzymes/BNP/TSH/INR   Recent Labs   Lab Test 10/20/22  1342   CHOL 187      LDL 75   TRIG 61     Recent Labs   Lab Test 10/20/22  1342 02/04/22  1327 10/18/21  1329   LDL 75 75 91     Recent Labs   Lab Test 10/20/22  1342      POTASSIUM 4.5   CHLORIDE 101   CO2 22   GLC 85   BUN 13.9   CR 0.78   GFRESTIMATED 77   TRACE 10.2     Recent Labs   Lab Test 10/20/22  1342 06/13/22  1324 06/10/22  1311   CR 0.78 0.76 0.7     No results for input(s): A1C in the last 78095 hours.       Recent Labs   Lab Test 07/18/22  1146 06/13/22  1324   WBC  --   8.5   HGB 12.1 13.4   HCT  --  42.0   MCV  --  103*   PLT  --  273     Recent Labs   Lab Test 07/18/22  1146 06/13/22  1324 06/07/21  1309   HGB 12.1 13.4 13.3    Recent Labs   Lab Test 08/09/22  1430 01/30/21  0602 01/30/21  0023   TROPONINI <0.01 <0.01 <0.01     No results for input(s): BNP, NTBNPI, NTBNP in the last 54212 hours.  Recent Labs   Lab Test 10/07/19  1602   TSH 1.55     Recent Labs   Lab Test 01/29/21  1307   INR 1.00        Shivam Denise MD    Thank you for allowing me to participate in the care of your patient.    Sincerely,     Shivam Denise MD     [facility]    cc:   Shivam Denise MD  [address]  [address]  [place] [address]

## 2023-01-30 NOTE — PATIENT INSTRUCTIONS
We are going to plan a cholesterol blood work which can arrange the same day as your upcoming CT scan.Please call my nurse Chica or write on my chart with any questions.Please let me know if the fast heart rhythms become more frequent and would decide about wearing a monitor.Her number is [phone]  We talked about holding your cholesterol for a week to see if the the leg pain improves and let me know.

## 2023-02-17 ENCOUNTER — LAB (OUTPATIENT)
Dept: LAB | Facility: CLINIC | Age: 78
End: 2023-02-17
Attending: INTERNAL MEDICINE
Payer: MEDICARE

## 2023-02-17 ENCOUNTER — HOSPITAL ENCOUNTER (OUTPATIENT)
Dept: CT IMAGING | Facility: CLINIC | Age: 78
Discharge: HOME | End: 2023-02-17
Attending: INTERNAL MEDICINE
Payer: MEDICARE

## 2023-02-17 DIAGNOSIS — C34.11 MALIGNANT NEOPLASM OF UPPER LOBE OF RIGHT LUNG (H): ICD-10-CM

## 2023-02-17 DIAGNOSIS — I25.10 CORONARY ARTERY DISEASE DUE TO LIPID RICH PLAQUE: ICD-10-CM

## 2023-02-17 DIAGNOSIS — I25.83 CORONARY ARTERY DISEASE DUE TO LIPID RICH PLAQUE: ICD-10-CM

## 2023-02-17 LAB
CHOLEST SERPL-MCNC: 184 MG/DL
FASTING STATUS PATIENT QL REPORTED: YES
HDLC SERPL-MCNC: 95 MG/DL
LDLC SERPL CALC-MCNC: 80 MG/DL
TRIGL SERPL-MCNC: 46 MG/DL

## 2023-02-17 PROCEDURE — 80061 LIPID PANEL: CPT

## 2023-02-17 PROCEDURE — G1010 CDSM STANSON: HCPCS

## 2023-02-17 PROCEDURE — 36415 COLL VENOUS BLD VENIPUNCTURE: CPT

## 2023-02-20 ENCOUNTER — ONCOLOGY VISIT (OUTPATIENT)
Dept: ONCOLOGY | Facility: CLINIC | Age: 78
End: 2023-02-20
Attending: INTERNAL MEDICINE
Payer: MEDICARE

## 2023-02-20 VITALS
DIASTOLIC BLOOD PRESSURE: 89 MMHG | OXYGEN SATURATION: 96 % | HEART RATE: 80 BPM | BODY MASS INDEX: 24.75 KG/M2 | HEIGHT: 66 IN | SYSTOLIC BLOOD PRESSURE: 139 MMHG | WEIGHT: 154 LBS

## 2023-02-20 DIAGNOSIS — C34.11 MALIGNANT NEOPLASM OF UPPER LOBE OF RIGHT LUNG (H): Primary | ICD-10-CM

## 2023-02-20 PROCEDURE — G0463 HOSPITAL OUTPT CLINIC VISIT: HCPCS | Performed by: INTERNAL MEDICINE

## 2023-02-20 PROCEDURE — 99214 OFFICE O/P EST MOD 30 MIN: CPT | Performed by: INTERNAL MEDICINE

## 2023-02-20 ASSESSMENT — PAIN SCALES - GENERAL: PAINLEVEL: WORST PAIN (10)

## 2023-02-20 NOTE — PROGRESS NOTES
"Oncology Rooming Note    February 20, 2023 12:54 PM   Shantel Romero is a 77 year old female who presents for:    Chief Complaint   Patient presents with     Oncology Clinic Visit     Malignant neoplasm of upper lobe of right lung      Initial Vitals: Ht 1.676 m (5' 5.98\")   BMI 24.55 kg/m   Estimated body mass index is 24.55 kg/m  as calculated from the following:    Height as of this encounter: 1.676 m (5' 5.98\").    Weight as of 1/30/23: 68.9 kg (152 lb). Body surface area is 1.79 meters squared.  Data Unavailable Comment: Data Unavailable   No LMP recorded.  Allergies reviewed: Yes  Medications reviewed: Yes    Medications: Medication refills not needed today.  Pharmacy name entered into [employer]: [facility][id number] - [place], [place] - [facility] [address] [facility] [address]    Clinical concerns: 4 month follow up with prior CT      Vandana Andrea MA            "

## 2023-02-20 NOTE — LETTER
"    2/20/2023         RE: Shantel Romero  [address]  [place] [address]        Dear Colleague,    Thank you for referring your patient, Shantel Romero, to the [facility]. Please see a copy of my visit note below.    Oncology Rooming Note    February 20, 2023 12:54 PM   Shantel Romero is a 77 year old female who presents for:    Chief Complaint   Patient presents with     Oncology Clinic Visit     Malignant neoplasm of upper lobe of right lung      Initial Vitals: Ht 1.676 m (5' 5.98\")   BMI 24.55 kg/m   Estimated body mass index is 24.55 kg/m  as calculated from the following:    Height as of this encounter: 1.676 m (5' 5.98\").    Weight as of 1/30/23: 68.9 kg (152 lb). Body surface area is 1.79 meters squared.  Data Unavailable Comment: Data Unavailable   No LMP recorded.  Allergies reviewed: Yes  Medications reviewed: Yes    Medications: Medication refills not needed today.  Pharmacy name entered into [employer]: [facility] #[id number] [facility] [address] AT [facility] [address]    Clinical concerns: 4 month follow up with prior CT      Vandana Andrea MA              [facility] Hematology and Oncology Progress Note    Patient: Shantel Romero  MRN: [id number]  Date of Service: Feb 20, 2023        Assessment and Plan:    1.  Right-sided lung cancer: She had a CT scan performed on February 17.  I personally viewed the images and shared them with Bj.  The right lung nodule surrounding fibrosis appears generally stable compared to 4 months ago.  She is approaching 2 years from her radiosurgery.  We will see her back in July which will be the 2-year greg.  We will get a repeat scan at that time.  If there is no significant change then we will change her follow-up schedule to every 6 months.  At this point I do not think any further treatment is needed.  The right lower lobe nodule is stable at 6 mm.    ECOG " "Performance  1    Diagnosis:    1.  Right upper lobe lung mass: Initially seen in 2019. Has not been biopsied.    Treatment:    Stereotactic radiosurgery: Right upper lobe nodule.  5400 cGy given in 3 fractions. Completed July 9, 2021.    Interim History:    Jan returns today for follow-up visit.  Overall she is doing okay.  No acute complaints today.  Continues to have significant dyspnea on exertion from her COPD.  No change in cough.  No hemoptysis or right-sided chest pain.    Review of Systems:    As above in the history.     Review of Systems otherwise Negative for:  General: chills, fever or night sweats  Psychological: anxiety or depression  Ophthalmic: blurry vision, double vision or loss of vision, vision change  ENT: epistaxis, oral lesions, hearing changes  Hematological and Lymphatic: bleeding, bruising, jaundice, swollen lymph nodes  Endocrine: hot flashes, unexpected weight changes  Respiratory: cough, hemoptysis, orthopnea  Cardiovascular: chest pain, edema, palpitations or PND  Gastrointestinal: abdominal pain, blood in stools, change in bowel habits, constipation, diarrhea or nausea/vomiting  Genito-Urinary: change in urinary stream, incontinence, frequency/urgency  Musculoskeletal: joint stiffness, swelling, muscle pain  Neurological: dizziness, headaches, numbness/tingling  Dermatological: lumps and rash    Past History:    Past Medical History:   Diagnosis Date     COPD (chronic obstructive pulmonary disease) (H)      Coronary artery disease due to lipid rich plaque 02/2021    Abnormal coronary CTA     Family history of myocardial infarction     mother under age 60's  CVA and MI     High cholesterol      Hypertension      PVC's (premature ventricular contractions)      Syncope 2012     Tubular adenoma of colon      Physical Exam:    /89 (BP Location: Left arm, Patient Position: Sitting, Cuff Size: Adult Regular)   Pulse 80   Ht 1.676 m (5' 5.98\")   Wt 69.9 kg (154 lb)   SpO2 96%   BMI " 24.87 kg/m      General: patient appears stated age of 76 year old. Nontoxic and in no distress.   HEENT: Head: atraumatic, normocephalic. Sclerae anicteric.  Chest:  Normal respiratory effort  Cardiac:  No edema.   Abdomen: abdomen is non-distended  Extremities: normal tone and muscle bulk.  Skin: no lesions or rash on visible skin. Warm and dry.   CNS: alert and oriented. Grossly non-focal.   Psychiatric: normal mood and affect.     Lab Results:    Recent Results (from the past 168 hour(s))   Lipid Profile   Result Value Ref Range    Cholesterol 184 <=199 mg/dL    Triglycerides 46 <=149 mg/dL    Direct Measure HDL 95 >=50 mg/dL    LDL Cholesterol Calculated 80 <=129 mg/dL    Patient Fasting > 8hrs? Yes       Imaging:    CT Chest w/o Contrast    Result Date: 2/17/2023  EXAM: CT CHEST W/O CONTRAST LOCATION: [facility] DATE/TIME: 2/17/2023 1:24 PM INDICATION: lung cancer follow up COMPARISON: 10/21/2022, 06/10/2022, and 02/11/2022 TECHNIQUE: CT chest without IV contrast. Multiplanar reformats were obtained. Dose reduction techniques were used. CONTRAST: None. FINDINGS: LUNGS AND PLEURA: Stable 9 x 12 mm nodule right upper lobe with mild surrounding infiltrative opacities have just slightly increased likely posttreatment change. Stable 6 mm nodule right lower lobe peribronchial vascular image 115. No new findings. Severe emphysema. MEDIASTINUM/AXILLAE: No lymphadenopathy. CORONARY ARTERY CALCIFICATION: Severe. UPPER ABDOMEN: No significant finding. MUSCULOSKELETAL: Unremarkable.     IMPRESSION: 1.  Stable 9 x 12 mm nodule right upper lobe with surrounding fibrotic appearing change. 2.  Stable right lower lobe 6 mm nodule. 3.  No new findings. 4.  Severe emphysema.       Signed by: Timoteo Barnhart MD        Again, thank you for allowing me to participate in the care of your patient.        Sincerely,        Timoteo Barnhart MD

## 2023-02-20 NOTE — PROGRESS NOTES
[facility] Hematology and Oncology Progress Note    Patient: Shantel Romero  MRN: [id number]  Date of Service: Feb 20, 2023        Assessment and Plan:    1.  Right-sided lung cancer: She had a CT scan performed on February 17.  I personally viewed the images and shared them with Bj.  The right lung nodule surrounding fibrosis appears generally stable compared to 4 months ago.  She is approaching 2 years from her radiosurgery.  We will see her back in July which will be the 2-year greg.  We will get a repeat scan at that time.  If there is no significant change then we will change her follow-up schedule to every 6 months.  At this point I do not think any further treatment is needed.  The right lower lobe nodule is stable at 6 mm.    ECOG Performance  1    Diagnosis:    1.  Right upper lobe lung mass: Initially seen in 2019. Has not been biopsied.    Treatment:    Stereotactic radiosurgery: Right upper lobe nodule.  5400 cGy given in 3 fractions. Completed July 9, 2021.    Interim History:    Cosme returns today for follow-up visit.  Overall she is doing okay.  No acute complaints today.  Continues to have significant dyspnea on exertion from her COPD.  No change in cough.  No hemoptysis or right-sided chest pain.    Review of Systems:    As above in the history.     Review of Systems otherwise Negative for:  General: chills, fever or night sweats  Psychological: anxiety or depression  Ophthalmic: blurry vision, double vision or loss of vision, vision change  ENT: epistaxis, oral lesions, hearing changes  Hematological and Lymphatic: bleeding, bruising, jaundice, swollen lymph nodes  Endocrine: hot flashes, unexpected weight changes  Respiratory: cough, hemoptysis, orthopnea  Cardiovascular: chest pain, edema, palpitations or PND  Gastrointestinal: abdominal pain, blood in stools, change in bowel habits, constipation, diarrhea or nausea/vomiting  Genito-Urinary: change in urinary stream, incontinence,  "frequency/urgency  Musculoskeletal: joint stiffness, swelling, muscle pain  Neurological: dizziness, headaches, numbness/tingling  Dermatological: lumps and rash    Past History:    Past Medical History:   Diagnosis Date     COPD (chronic obstructive pulmonary disease) (H)      Coronary artery disease due to lipid rich plaque 02/2021    Abnormal coronary CTA     Family history of myocardial infarction     mother under age 60's  CVA and MI     High cholesterol      Hypertension      PVC's (premature ventricular contractions)      Syncope 2012     Tubular adenoma of colon      Physical Exam:    /89 (BP Location: Left arm, Patient Position: Sitting, Cuff Size: Adult Regular)   Pulse 80   Ht 1.676 m (5' 5.98\")   Wt 69.9 kg (154 lb)   SpO2 96%   BMI 24.87 kg/m      General: patient appears stated age of 76 year old. Nontoxic and in no distress.   HEENT: Head: atraumatic, normocephalic. Sclerae anicteric.  Chest:  Normal respiratory effort  Cardiac:  No edema.   Abdomen: abdomen is non-distended  Extremities: normal tone and muscle bulk.  Skin: no lesions or rash on visible skin. Warm and dry.   CNS: alert and oriented. Grossly non-focal.   Psychiatric: normal mood and affect.     Lab Results:    Recent Results (from the past 168 hour(s))   Lipid Profile   Result Value Ref Range    Cholesterol 184 <=199 mg/dL    Triglycerides 46 <=149 mg/dL    Direct Measure HDL 95 >=50 mg/dL    LDL Cholesterol Calculated 80 <=129 mg/dL    Patient Fasting > 8hrs? Yes       Imaging:    CT Chest w/o Contrast    Result Date: 2/17/2023  EXAM: CT CHEST W/O CONTRAST LOCATION: [facility] DATE/TIME: 2/17/2023 1:24 PM INDICATION: lung cancer follow up COMPARISON: 10/21/2022, 06/10/2022, and 02/11/2022 TECHNIQUE: CT chest without IV contrast. Multiplanar reformats were obtained. Dose reduction techniques were used. CONTRAST: None. FINDINGS: LUNGS AND PLEURA: Stable 9 x 12 mm nodule right upper lobe with mild " surrounding infiltrative opacities have just slightly increased likely posttreatment change. Stable 6 mm nodule right lower lobe peribronchial vascular image 115. No new findings. Severe emphysema. MEDIASTINUM/AXILLAE: No lymphadenopathy. CORONARY ARTERY CALCIFICATION: Severe. UPPER ABDOMEN: No significant finding. MUSCULOSKELETAL: Unremarkable.     IMPRESSION: 1.  Stable 9 x 12 mm nodule right upper lobe with surrounding fibrotic appearing change. 2.  Stable right lower lobe 6 mm nodule. 3.  No new findings. 4.  Severe emphysema.       Signed by: Timoteo Barnhart MD

## 2023-04-10 ENCOUNTER — OFFICE VISIT (OUTPATIENT)
Dept: PULMONOLOGY | Facility: CLINIC | Age: 78
End: 2023-04-10
Payer: MEDICARE

## 2023-04-10 VITALS
OXYGEN SATURATION: 97 % | HEART RATE: 88 BPM | BODY MASS INDEX: 24.22 KG/M2 | DIASTOLIC BLOOD PRESSURE: 80 MMHG | SYSTOLIC BLOOD PRESSURE: 134 MMHG | WEIGHT: 150 LBS

## 2023-04-10 DIAGNOSIS — C34.11 MALIGNANT NEOPLASM OF UPPER LOBE OF RIGHT LUNG (H): ICD-10-CM

## 2023-04-10 DIAGNOSIS — J43.1 PANLOBULAR EMPHYSEMA (H): ICD-10-CM

## 2023-04-10 DIAGNOSIS — R06.09 DYSPNEA ON EXERTION: ICD-10-CM

## 2023-04-10 DIAGNOSIS — J44.9 CHRONIC OBSTRUCTIVE PULMONARY DISEASE, UNSPECIFIED COPD TYPE (H): Primary | ICD-10-CM

## 2023-04-10 PROCEDURE — 99214 OFFICE O/P EST MOD 30 MIN: CPT | Performed by: NURSE PRACTITIONER

## 2023-04-10 NOTE — PATIENT INSTRUCTIONS
- once you finish the Trelegy if you want to switch back let me know.   - try and stay active, get some exercise every day.    If you have worsening symptoms, questions, or need to speak with the nurse please call [phone].

## 2023-04-10 NOTE — PROGRESS NOTES
Pulmonary Medicine Consult   April 10, 2023    Assessment and Plan:   #1. Dyspnea on exertion -- minimal   #2. COPD GOLD B -- no flares since last visit. PFTs show moderate-severe obstructive disease with a moderately reduced diffusion capacity.  #3. Emphysema  #4. Right upper lobe lung mass -- completed SBRT in July 2021 for right upper lobe cancer. She sees Dr. Barnhart in oncology who is following this mass with serial CT scans.  Last CT chest showed stable size, follow-up scan scheduled for July 2023.       CONTINUE Trelegy 1 puff daily.  When she finishes her 3-month supply she will let me know if she wants to stick with this or change back to Spiriva and Wixela.    CONTINUE Albuterol inhaler every 4 hours as needed for increased shortness of breath or chest tightness. You can use the spacer with this to help you get the medication into your lungs    Try and increase exercise as you are able     Not interested in pulmonary rehab at this time.    UTD with seasonal flu vaccine, COVID booster, and pneumonia vaccine.     RTC 6 months    Maura Turner CNP  Pulmonary Medicine  [facility]   [phone]    ___________________________________________________________________    CC:   Chief Complaint   Patient presents with     Follow Up     COPD       HPI:   Shantel Romero is a 77 year old female with history of COPD, emphysema, CAD, HLD, HTN, and lung cancer diagnosed in 2019 s/p radiation that finished earlier this year presenting today for follow up.  She was last seen in clinic in October 2022 where we switched her from Spiriva and Wixela to Trelegy.  She had been taking Spiriva daily for years and was still noticing high symptom burden. Had not tried any other inhalers. No exacerbations since her last visit.   Reports ongoing LUKE that she believes to be partially related to anxiety. She notes Clonazepam helps the most. Feels her symptoms were better controlled with Spiriva and Wixela. She has a few more  months but would like to switch back. She has been needing her rescue inhaler a few times daily. Is not very active.  She had previously been having issues using an HFA inhaler and when demonstrating technique in clinic was found to be using this incorrectly likely was not getting any medication.  Denies cough, chest tightness, or wheeze. Symptoms seem worse with activity and her activity tolerance is lower. She is able to go shopping and walk around her house if she paces herself mostly due to occasional dizziness with activity. Not very active, spends most of the day on the computer.   PND uses Flonase with some relief. Denies sinus pressure or congestion.   Still has intermittent chest pain. Followed by cardiology, uses nitroglycerin with relief.     She was previously seen at [place] [facility] by Dr. Jay Gonzalez. She had PFTs done and was evaluated when she was initially diagnosed with lung cancer in 2019. Those records were not available.      Patient supplied answers from flow sheet for:  COPD Assessment Test (CAT)  2009 [facility]. All rights reserved.      8/8/2022     1:00 PM 10/10/2022    12:00 PM 4/7/2023     3:04 PM   COPD assessment test (CAT)   Cough 0 5 1   Phlegm 0 2 1   Chest tightness 3 5 4   Walk up hill 5 3 4   Limited activities 5 3 3   Leaving my home 0 0 1   Sleep 3 0 2   Energy 5 4 3   Total Score 21 22 19        ROS:   10 point ROS negative unless otherwise noted in HPI    Physical Exam:  /80 (BP Location: Right arm, Patient Position: Chair, Cuff Size: Adult Regular)   Pulse 88   Wt 68 kg (150 lb)   SpO2 97%   BMI 24.22 kg/m      Physical Exam  Constitutional:       General: She is not in acute distress.     Appearance: She is not ill-appearing or diaphoretic.   HENT:      Nose: Nose normal.   Cardiovascular:      Rate and Rhythm: Normal rate and regular rhythm.      Pulses: Normal pulses.      Heart sounds: Normal heart sounds.   Pulmonary:      Effort: Pulmonary effort is normal. No  respiratory distress.      Breath sounds: Normal breath sounds. No wheezing or rhonchi.   Musculoskeletal:      Right lower leg: No edema.      Left lower leg: No edema.   Skin:     General: Skin is warm and dry.   Neurological:      Mental Status: She is alert.   Psychiatric:         Behavior: Behavior normal.       PMH:  Patient Active Problem List    Diagnosis Date Noted     Abnormal electrocardiogram 2022     Priority: Medium     Malignant neoplasm of upper lobe of right lung (H) 2021     Priority: Medium     Mass of upper lobe of right lung 2021     Priority: Medium     Tobacco abuse 2021     Priority: Medium     Hypertension, unspecified type      Priority: Medium     Dyspnea on exertion      Priority: Medium     Coronary artery disease due to lipid rich plaque      Priority: Medium     Chest pain 2021     Priority: Medium     Osteopenia      Priority: Medium     Created by Conversion  Replacement Utility updated for latest IMO load         Premature Menopause      Priority: Medium     Created by Conversion           PSH:  Past Surgical History:   Procedure Laterality Date     APPENDECTOMY       SHOULDER SURGERY Right      TUBAL LIGATION         Family HX: family history includes Breast Cancer in her paternal aunt; Cerebrovascular Disease in her mother; Coronary Artery Disease in her mother; Lung Cancer in her father.    Social Hx:  Social History     Socioeconomic History     Marital status:      Spouse name: Not on file     Number of children: Not on file     Years of education: Not on file     Highest education level: Not on file   Occupational History     Not on file   Tobacco Use     Smoking status: Former     Types: Cigarettes     Quit date: 2019     Years since quittin.2     Smokeless tobacco: Never   Vaping Use     Vaping status: Never Used   Substance and Sexual Activity     Alcohol use: Not on file     Drug use: Not on file     Sexual activity: Not on file    Other Topics Concern     Parent/sibling w/ CABG, MI or angioplasty before 65F 55M? Not Asked   Social History Narrative     Not on file     Social Determinants of Health     Financial Resource Strain: Not on file   Food Insecurity: Not on file   Transportation Needs: Not on file   Physical Activity: Not on file   Stress: Not on file   Social Connections: Not on file   Intimate Partner Violence: Not on file   Housing Stability: Not on file         Current Meds:  Current Outpatient Medications   Medication Sig Dispense Refill     acetaminophen (TYLENOL) 500 MG tablet Take 500-1,000 mg by mouth every 8 hours as needed        aspirin (ASA) 81 MG EC tablet Take 81 mg by mouth daily       atorvastatin (LIPITOR) 40 MG tablet Take 40 mg by mouth At Bedtime       Cholecalciferol 250 MCG (47577 UT) TABS Take 10,000 Units by mouth daily       famotidine (PEPCID) 20 MG tablet Take 1 tablet (20 mg) by mouth 2 times daily 180 tablet 3     fluticasone (FLONASE) 50 MCG/ACT nasal spray Spray 2 sprays in nostril as needed       Fluticasone-Umeclidin-Vilanterol (TRELEGY ELLIPTA) 200-62.5-25 MCG/ACT oral inhaler Inhale 1 puff into the lungs daily       HEMP OIL OR EXTRACT OR OTHER CBD CANNABINOID, NOT MEDICAL CANNABIS, 1 gummy at HS for sleep-delta 9       IBANdronate (BONIVA) 150 MG tablet Take 150 mg by mouth every 30 days       lisinopril (ZESTRIL) 5 MG tablet Take 2.5 mg by mouth daily       melatonin 3 MG CAPS Take 1 capsule by mouth At Bedtime       Multiple Vitamin (MULTIVITAMIN ADULT PO) Take 1 tablet by mouth daily       nicotine polacrilex (NICORETTE) 4 MG gum Take 4 mg by mouth every hour as needed       nitroGLYcerin (NITRODUR) 0.2 MG/HR 24 hr patch Place 1 patch onto the skin daily 90 patch 3     Turmeric 400 MG CAPS Take 800 mg by mouth daily       albuterol (PROAIR HFA/PROVENTIL HFA/VENTOLIN HFA) 108 (90 Base) MCG/ACT inhaler Inhale 2 puffs into the lungs       fluticasone-salmeterol (ADVAIR) 250-50 MCG/ACT inhaler  Inhale 1 puff into the lungs 2 times daily 180 each 1     nitroGLYcerin (NITROSTAT) 0.4 MG sublingual tablet For chest pain place 1 tablet under the tongue every 5 minutes for 3 doses. If symptoms persist 5 minutes after 1st dose call 911. (Patient not taking: Reported on 4/10/2023) 25 tablet 4     Omega-3 1000 MG capsule Take 1 g by mouth daily       tiotropium (SPIRIVA) 18 MCG inhaled capsule Inhale 18 mcg into the lungs daily         Allergies:  No Known Allergies    Labs:   personally reviewed in the EMR.    Imaging studies: personally reviewed and interpreted. Below are the Radiology interpretations.      PFT's (7/8/2022): abnormal  FEV1/FVC is 60% and is reduced.  FEV1 is 1.13L (53%) predicted and is reduced  FVC is 1.88L (67%) predicted and is reduced  There was improvement in spirometry after a single inhaled dose of bronchodilator.  TLC is 4.77L (90%) predicted and is normal.  RV is 2.89L (126%) predicted and is normal.  DLCO is10.57ml/min/hg (52%) predicted and is reduced when it is corrected for hemoglobin.  Flow volume loops indicate scooping.    Impression:  Full Pulmonary Function Test is abnormal.  PFTs are consistent with moderate-severe obstructive disease.  Spirometry is consistent with reversibility.  There is no hyperinflation.  There is no air-trapping.  Diffusion capacity when corrected for hemoglobin is moderately reduced.      Chest CT 6/10/22:  IMPRESSION:  1.  Stable 14 x 10 mm nodule right upper lobe since 02/11/2022.  2.  New mild radiation pneumonitis around this nodule.  3.  No significant new findings.    Echo 1/30/2021:  Narrative & Impression    No previous study for comparison.    Normal left ventricular size. Mild left ventricular hypertrophy    Left ventricle ejection fraction is normal. The calculated left ventricular ejection fraction is 67%.    Possible mild right ventricular enlargement.    Normal right ventricular systolic function.    Mild right atrial enlargement.    Aortic  valve sclerosis without evidence for significant aortic stenosis. Mild aortic insufficiency.    Trace pericardial effusion suggested

## 2023-04-19 ENCOUNTER — TELEPHONE (OUTPATIENT)
Dept: PULMONOLOGY | Facility: CLINIC | Age: 78
End: 2023-04-19
Payer: MEDICARE

## 2023-04-19 DIAGNOSIS — J44.9 CHRONIC OBSTRUCTIVE PULMONARY DISEASE, UNSPECIFIED COPD TYPE (H): Primary | ICD-10-CM

## 2023-04-19 RX ORDER — TIOTROPIUM BROMIDE 18 UG/1
18 CAPSULE ORAL; RESPIRATORY (INHALATION) DAILY
Qty: 90 CAPSULE | Refills: 3 | Status: SHIPPED | OUTPATIENT
Start: 2023-04-19 | End: 2023-10-13 | Stop reason: ALTCHOICE

## 2023-04-19 RX ORDER — FLUTICASONE PROPIONATE AND SALMETEROL 500; 50 UG/1; UG/1
1 POWDER RESPIRATORY (INHALATION) EVERY 12 HOURS
Qty: 180 EACH | Refills: 3 | Status: SHIPPED | OUTPATIENT
Start: 2023-04-19 | End: 2023-10-13 | Stop reason: ALTCHOICE

## 2023-04-19 RX ORDER — PREDNISONE 20 MG/1
40 TABLET ORAL DAILY
Qty: 10 TABLET | Refills: 0 | Status: SHIPPED | OUTPATIENT
Start: 2023-04-19 | End: 2023-04-24

## 2023-04-19 NOTE — TELEPHONE ENCOUNTER
Shantel called today in regards to wanting to switch from Trelegy to Spiriva/Wixela now instead of in 3 months after her Trelegy runs out. She feels like the Trelegy in not working for her as well as her old medication. She reports having increase in cough, shortness of breath with any activity, and sweating but denies fevers. She is unable to get any phlegm up. Consulted with Maura Turner CNP, she wants her to try a burst of prednisone 40 mg x 5 days. Maura has ordered written prescriptions for the Spiriva and Wixela. Mailed to Shantel at address listed in chart. Shantel sends them to [facility] to fill due to cost. Shantel will let us know how the prednisone worked and agrees to go to the ED or call 911 if symptoms get worse.

## 2023-04-20 ENCOUNTER — TRANSFERRED RECORDS (OUTPATIENT)
Dept: HEALTH INFORMATION MANAGEMENT | Facility: CLINIC | Age: 78
End: 2023-04-20

## 2023-07-21 ENCOUNTER — HOSPITAL ENCOUNTER (OUTPATIENT)
Dept: CT IMAGING | Facility: CLINIC | Age: 78
Discharge: HOME | End: 2023-07-21
Attending: INTERNAL MEDICINE | Admitting: INTERNAL MEDICINE
Payer: MEDICARE

## 2023-07-21 DIAGNOSIS — C34.11 MALIGNANT NEOPLASM OF UPPER LOBE OF RIGHT LUNG (H): ICD-10-CM

## 2023-07-21 PROCEDURE — 71250 CT THORAX DX C-: CPT | Mod: MF

## 2023-07-21 PROCEDURE — G1010 CDSM STANSON: HCPCS

## 2023-07-24 ENCOUNTER — ONCOLOGY VISIT (OUTPATIENT)
Dept: ONCOLOGY | Facility: CLINIC | Age: 78
End: 2023-07-24
Attending: INTERNAL MEDICINE
Payer: MEDICARE

## 2023-07-24 VITALS
OXYGEN SATURATION: 98 % | BODY MASS INDEX: 24.59 KG/M2 | RESPIRATION RATE: 18 BRPM | HEIGHT: 66 IN | SYSTOLIC BLOOD PRESSURE: 167 MMHG | DIASTOLIC BLOOD PRESSURE: 90 MMHG | HEART RATE: 91 BPM | WEIGHT: 153 LBS

## 2023-07-24 DIAGNOSIS — R91.1 NODULE OF LOWER LOBE OF RIGHT LUNG: ICD-10-CM

## 2023-07-24 DIAGNOSIS — C34.11 MALIGNANT NEOPLASM OF UPPER LOBE OF RIGHT LUNG (H): Primary | ICD-10-CM

## 2023-07-24 PROCEDURE — 99214 OFFICE O/P EST MOD 30 MIN: CPT | Performed by: INTERNAL MEDICINE

## 2023-07-24 PROCEDURE — G0463 HOSPITAL OUTPT CLINIC VISIT: HCPCS | Performed by: INTERNAL MEDICINE

## 2023-07-24 ASSESSMENT — PAIN SCALES - GENERAL: PAINLEVEL: NO PAIN (0)

## 2023-07-24 NOTE — PROGRESS NOTES
"Oncology Rooming Note    July 24, 2023 12:56 PM   Shantel Romero is a 78 year old female who presents for:    Chief Complaint   Patient presents with    Oncology Clinic Visit     Malignant neoplasm of upper lobe of right lung      Initial Vitals: BP (!) 167/90   Pulse 91   Resp 18   Ht 1.676 m (5' 6\")   Wt 69.4 kg (153 lb)   SpO2 98%   BMI 24.69 kg/m   Estimated body mass index is 24.69 kg/m  as calculated from the following:    Height as of this encounter: 1.676 m (5' 6\").    Weight as of this encounter: 69.4 kg (153 lb). Body surface area is 1.8 meters squared.  No Pain (0) Comment: Data Unavailable   No LMP recorded.  Allergies reviewed: Yes  Medications reviewed: Yes    Medications: Medication refills not needed today.  Pharmacy name entered into [employer]: [facility][id number] - [address] AT [facility] [address]    Clinical concerns:  lab and CT results       Luz Matos            "

## 2023-07-24 NOTE — LETTER
"    7/24/2023         RE: Shantel Romero  [address]  [place] [address]        Dear Colleague,    Thank you for referring your patient, Shantel Romero, to the [facility]. Please see a copy of my visit note below.    Oncology Rooming Note    July 24, 2023 12:56 PM   Shantel Romero is a 78 year old female who presents for:    Chief Complaint   Patient presents with     Oncology Clinic Visit     Malignant neoplasm of upper lobe of right lung      Initial Vitals: BP (!) 167/90   Pulse 91   Resp 18   Ht 1.676 m (5' 6\")   Wt 69.4 kg (153 lb)   SpO2 98%   BMI 24.69 kg/m   Estimated body mass index is 24.69 kg/m  as calculated from the following:    Height as of this encounter: 1.676 m (5' 6\").    Weight as of this encounter: 69.4 kg (153 lb). Body surface area is 1.8 meters squared.  No Pain (0) Comment: Data Unavailable   No LMP recorded.  Allergies reviewed: Yes  Medications reviewed: Yes    Medications: Medication refills not needed today.  Pharmacy name entered into [employer]: [id number] - [facility] [place] [facility] [address] [facility] [address]    Clinical concerns:  lab and CT results       Luz Matos              [facility] Hematology and Oncology Progress Note    Patient: Shantel Romero  MRN: [id number]  Date of Service: Jul 24, 2023        Assessment and Plan:    1.  Right-sided lung cancer: She recently had a PET scan performed.  I personally reviewed the images and shared them with Cosme.  The right upper lobe treatment-related changes appear stable.  There is a small nodule of the right lower lobe which has enlarged over the past 6 months or so.  Currently measuring 7 x 9 mm.  This is most likely second primary lung cancer.  We will continue radiation therapy for this.  Patient prefers not to have a biopsy if possible.  She may need a fiducial low.    ECOG Performance  1    Diagnosis:    1.  Right upper lobe lung " "mass: Initially seen in 2019. Has not been biopsied.    Treatment:    Stereotactic radiosurgery: Right upper lobe nodule.  5400 cGy given in 3 fractions. Completed July 9, 2021.    Interim History:    Cosme returns today for follow-up visit.  She has been doing fine since her last visit.  No new cough, chest pain, shortness of breath.  Energy is okay.    Review of Systems:    As above in the history.     Review of Systems otherwise Negative for:  General: chills, fever or night sweats  Psychological: anxiety or depression  Ophthalmic: blurry vision, double vision or loss of vision, vision change  ENT: epistaxis, oral lesions, hearing changes  Hematological and Lymphatic: bleeding, bruising, jaundice, swollen lymph nodes  Endocrine: hot flashes, unexpected weight changes  Respiratory: cough, hemoptysis, orthopnea  Cardiovascular: chest pain, edema, palpitations or PND  Gastrointestinal: abdominal pain, blood in stools, change in bowel habits, constipation, diarrhea or nausea/vomiting  Genito-Urinary: change in urinary stream, incontinence, frequency/urgency  Musculoskeletal: joint stiffness, swelling, muscle pain  Neurological: dizziness, headaches, numbness/tingling  Dermatological: lumps and rash    Past History:    Past Medical History:   Diagnosis Date     COPD (chronic obstructive pulmonary disease) (H)      Coronary artery disease due to lipid rich plaque 02/2021    Abnormal coronary CTA     Family history of myocardial infarction     mother under age 60's  CVA and MI     High cholesterol      Hypertension      PVC's (premature ventricular contractions)      Syncope 2012     Tubular adenoma of colon      Physical Exam:    BP (!) 167/90   Pulse 91   Resp 18   Ht 1.676 m (5' 6\")   Wt 69.4 kg (153 lb)   SpO2 98%   BMI 24.69 kg/m      General: patient appears stated age of 76 year old. Nontoxic and in no distress.   HEENT: Head: atraumatic, normocephalic.  Sclerae anicteric.  Chest:  Normal respiratory " effort  Cardiac:  No edema.   Abdomen: abdomen is non-distended  Extremities: normal tone and muscle bulk.  Skin: no lesions or rash on visible skin. Warm and dry.   CNS: alert and oriented. Grossly non-focal.   Psychiatric: normal mood and affect.     Lab Results:    No results found for this or any previous visit (from the past 168 hour(s)).     Imaging:    CT Chest w/o contrast    Result Date: 7/21/2023  EXAM: CT CHEST W/O CONTRAST LOCATION: [facility] DATE: 7/21/2023 INDICATION: Lung cancer, current or r o recurrence; Lung cancer surveillance; No known automatically detected potential contraindications to imaging COMPARISON: CT of the chest 02/17/2023, 10/21/2022 TECHNIQUE: CT chest without IV contrast. Multiplanar reformats were obtained. Dose reduction techniques were used. CONTRAST: None. FINDINGS: LUNGS AND PLEURA: 10 x 10 mm solid nodule lateral right upper lobe is stable (series 5, image 90). Surrounding parenchyma has a combination of consolidative/groundglass opacity and interstitial thickening with localized volume loss consistent with posttreatment cicatrization. A perihilar bronchovascular nodule in the posterior basal right lower lobe is slightly larger measuring 7 x 9 mm (series 5, image 177). No new lung nodules. Advanced emphysema and related hyperinflation. No pleural effusion, thickening, or nodularity. MEDIASTINUM: Cardiac chambers are normal in size. No pericardial effusion. Nonaneurysmal thoracic aorta. Moderate arch, great vessel, and descending aorta atheromatous calcifications. No enlarged mediastinal or hilar lymph nodes. Esophagus is decompressed. CORONARY ARTERY CALCIFICATION: Moderate. UPPER ABDOMEN: Stable benign cortical cyst posterior right kidney does not require specific workup or follow-up. No new findings in the imaged upper abdomen. MUSCULOSKELETAL: Disc space narrowing and small marginal osteophytes throughout the thoracic spine. No aggressive or  destructive bone lesions.     IMPRESSION: 1.  Stable 10 mm nodule in the right upper lobe laterally with surrounding cicatrization from treatment. 2.  Solid peribronchovascular nodule in the right lower lobe is slightly larger and suspect for neoplasm. The nodule is sufficiently large for characterization by PET-CT.       Signed by: Timoteo Barnhart MD        Again, thank you for allowing me to participate in the care of your patient.        Sincerely,        Timoteo Barnhart MD

## 2023-07-25 NOTE — PROGRESS NOTES
[facility] Hematology and Oncology Progress Note    Patient: Shantel Romero  MRN: [id number]  Date of Service: Jul 24, 2023        Assessment and Plan:    1.  Right-sided lung cancer: She recently had a PET scan performed.  I personally reviewed the images and shared them with Cosme.  The right upper lobe treatment-related changes appear stable.  There is a small nodule of the right lower lobe which has enlarged over the past 6 months or so.  Currently measuring 7 x 9 mm.  This is most likely second primary lung cancer.  We will continue radiation therapy for this.  Patient prefers not to have a biopsy if possible.  She may need a fiducial low.    ECOG Performance  1    Diagnosis:    1.  Right upper lobe lung mass: Initially seen in 2019. Has not been biopsied.    Treatment:    Stereotactic radiosurgery: Right upper lobe nodule.  5400 cGy given in 3 fractions. Completed July 9, 2021.    Interim History:    Cosme returns today for follow-up visit.  She has been doing fine since her last visit.  No new cough, chest pain, shortness of breath.  Energy is okay.    Review of Systems:    As above in the history.     Review of Systems otherwise Negative for:  General: chills, fever or night sweats  Psychological: anxiety or depression  Ophthalmic: blurry vision, double vision or loss of vision, vision change  ENT: epistaxis, oral lesions, hearing changes  Hematological and Lymphatic: bleeding, bruising, jaundice, swollen lymph nodes  Endocrine: hot flashes, unexpected weight changes  Respiratory: cough, hemoptysis, orthopnea  Cardiovascular: chest pain, edema, palpitations or PND  Gastrointestinal: abdominal pain, blood in stools, change in bowel habits, constipation, diarrhea or nausea/vomiting  Genito-Urinary: change in urinary stream, incontinence, frequency/urgency  Musculoskeletal: joint stiffness, swelling, muscle pain  Neurological: dizziness, headaches, numbness/tingling  Dermatological: lumps and rash    Past  "History:    Past Medical History:   Diagnosis Date     COPD (chronic obstructive pulmonary disease) (H)      Coronary artery disease due to lipid rich plaque 02/2021    Abnormal coronary CTA     Family history of myocardial infarction     mother under age 60's  CVA and MI     High cholesterol      Hypertension      PVC's (premature ventricular contractions)      Syncope 2012     Tubular adenoma of colon      Physical Exam:    BP (!) 167/90   Pulse 91   Resp 18   Ht 1.676 m (5' 6\")   Wt 69.4 kg (153 lb)   SpO2 98%   BMI 24.69 kg/m      General: patient appears stated age of 76 year old. Nontoxic and in no distress.   HEENT: Head: atraumatic, normocephalic.  Sclerae anicteric.  Chest:  Normal respiratory effort  Cardiac:  No edema.   Abdomen: abdomen is non-distended  Extremities: normal tone and muscle bulk.  Skin: no lesions or rash on visible skin. Warm and dry.   CNS: alert and oriented. Grossly non-focal.   Psychiatric: normal mood and affect.     Lab Results:    No results found for this or any previous visit (from the past 168 hour(s)).     Imaging:    CT Chest w/o contrast    Result Date: 7/21/2023  EXAM: CT CHEST W/O CONTRAST LOCATION: [facility] DATE: 7/21/2023 INDICATION: Lung cancer, current or r o recurrence; Lung cancer surveillance; No known automatically detected potential contraindications to imaging COMPARISON: CT of the chest 02/17/2023, 10/21/2022 TECHNIQUE: CT chest without IV contrast. Multiplanar reformats were obtained. Dose reduction techniques were used. CONTRAST: None. FINDINGS: LUNGS AND PLEURA: 10 x 10 mm solid nodule lateral right upper lobe is stable (series 5, image 90). Surrounding parenchyma has a combination of consolidative/groundglass opacity and interstitial thickening with localized volume loss consistent with posttreatment cicatrization. A perihilar bronchovascular nodule in the posterior basal right lower lobe is slightly larger measuring 7 x 9 " mm (series 5, image 177). No new lung nodules. Advanced emphysema and related hyperinflation. No pleural effusion, thickening, or nodularity. MEDIASTINUM: Cardiac chambers are normal in size. No pericardial effusion. Nonaneurysmal thoracic aorta. Moderate arch, great vessel, and descending aorta atheromatous calcifications. No enlarged mediastinal or hilar lymph nodes. Esophagus is decompressed. CORONARY ARTERY CALCIFICATION: Moderate. UPPER ABDOMEN: Stable benign cortical cyst posterior right kidney does not require specific workup or follow-up. No new findings in the imaged upper abdomen. MUSCULOSKELETAL: Disc space narrowing and small marginal osteophytes throughout the thoracic spine. No aggressive or destructive bone lesions.     IMPRESSION: 1.  Stable 10 mm nodule in the right upper lobe laterally with surrounding cicatrization from treatment. 2.  Solid peribronchovascular nodule in the right lower lobe is slightly larger and suspect for neoplasm. The nodule is sufficiently large for characterization by PET-CT.       Signed by: Timoteo Barnhart MD

## 2023-08-07 ENCOUNTER — TELEPHONE (OUTPATIENT)
Dept: ONCOLOGY | Facility: CLINIC | Age: 78
End: 2023-08-07
Payer: MEDICARE

## 2023-08-07 ENCOUNTER — TRANSFERRED RECORDS (OUTPATIENT)
Dept: HEALTH INFORMATION MANAGEMENT | Facility: CLINIC | Age: 78
End: 2023-08-07
Payer: MEDICARE

## 2023-08-07 DIAGNOSIS — C34.11 MALIGNANT NEOPLASM OF UPPER LOBE OF RIGHT LUNG (H): Primary | ICD-10-CM

## 2023-08-07 NOTE — TELEPHONE ENCOUNTER
----- Message from Timoteo Barnhart MD sent at 8/7/2023  8:23 AM CDT -----  Regarding: Radiation oncology appointment  This patient needs to see radiation again.  She is a known patient of Dr. Cruz.  I did put in a referral in case I needed to but could someone reach out to get her scheduled.  Thanks.    Christopher

## 2023-08-07 NOTE — TELEPHONE ENCOUNTER
LMTCB & Schedule appt with Dr. Fong per order (established with Dr. Fong)    Also to schedule follow up in Dec with Dr. Barnhart with CT prior

## 2023-08-14 ENCOUNTER — TELEPHONE (OUTPATIENT)
Dept: ONCOLOGY | Facility: CLINIC | Age: 78
End: 2023-08-14
Payer: MEDICARE

## 2023-08-14 NOTE — TELEPHONE ENCOUNTER
----- Message from Timoteo Barnhart MD sent at 8/10/2023  4:11 PM CDT -----  Regarding: FW: ? radiation  Can we please get this patient back into see Dr. Fong at [facility].  She is a known patient of hers.  Chantale.    Christopher    ----- Message -----  From: Jessica Fong MD  Sent: 7/26/2023  10:45 AM CDT  To: Timoteo Barnhart MD  Subject: RE: ? radiation                                  Hey Christopher,  I'm not sure how she fell off our follow-up schedule but thanks for following her.  The RLL nodule looks suspicious and would be amenable to SBRT with or without fiducial.   Would do PET/CT for complete staging prior to treating, I know she previously declined biopsy/fiducial but would have the conversation again.   Feel free to send her back our way if she'd like to discuss SBRT treatment again.   Chantale,  Tesha    ----- Message -----  From: Timoteo Barnhart MD  Sent: 7/24/2023   9:11 PM CDT  To: Jessica Fong MD  Subject: ? radiation                                      Hello,  Can you take a look at her recent CT and let me know what you think about the right lower lobe nodule.  I think you will need to be treated sooner than later.  Do think you can treat that without a fiducial/biopsy?  Sherrie White

## 2023-08-23 ENCOUNTER — TELEPHONE (OUTPATIENT)
Dept: ONCOLOGY | Facility: HOSPITAL | Age: 78
End: 2023-08-23
Payer: MEDICARE

## 2023-08-23 NOTE — TELEPHONE ENCOUNTER
I spoke with Cosme regarding attempts made to get her in to see RT. She responded that she does not answer if she doesn't recognize the number. I asked if it would be easier for her if I gave her the scheduling number and she said it would. She wrote it down and she will call. I also sent a staff message to Poonam at scheduling that Cosme has the correct phone number. She confirmed that she will call and set it up. ANGEL Cox RN, OCN, CBCN

## 2023-08-25 ENCOUNTER — OFFICE VISIT (OUTPATIENT)
Dept: RADIATION ONCOLOGY | Facility: CLINIC | Age: 78
End: 2023-08-25
Attending: INTERNAL MEDICINE
Payer: MEDICARE

## 2023-08-25 VITALS
OXYGEN SATURATION: 96 % | DIASTOLIC BLOOD PRESSURE: 77 MMHG | HEART RATE: 74 BPM | SYSTOLIC BLOOD PRESSURE: 173 MMHG | RESPIRATION RATE: 16 BRPM | BODY MASS INDEX: 24.39 KG/M2 | TEMPERATURE: 97.8 F | WEIGHT: 151.1 LBS

## 2023-08-25 DIAGNOSIS — C34.11 MALIGNANT NEOPLASM OF UPPER LOBE OF RIGHT LUNG (H): ICD-10-CM

## 2023-08-25 DIAGNOSIS — C34.31 MALIGNANT NEOPLASM OF LOWER LOBE OF RIGHT LUNG (H): Primary | ICD-10-CM

## 2023-08-25 PROCEDURE — G0463 HOSPITAL OUTPT CLINIC VISIT: HCPCS | Performed by: STUDENT IN AN ORGANIZED HEALTH CARE EDUCATION/TRAINING PROGRAM

## 2023-08-25 PROCEDURE — 99215 OFFICE O/P EST HI 40 MIN: CPT | Performed by: STUDENT IN AN ORGANIZED HEALTH CARE EDUCATION/TRAINING PROGRAM

## 2023-08-25 ASSESSMENT — PAIN SCALES - GENERAL: PAINLEVEL: NO PAIN (0)

## 2023-08-25 NOTE — PROGRESS NOTES
[facility] Radiation Oncology Consult Note     Patient: Shantel Romero  MRN: [id number]  Date of Service: 08/25/2023          Timoteo Barnhart MD  [address] [place] [address]       Dear Dr. Barnhart:    Thank you very much for referring this patient for consideration of radiotherapy. As you know Ms. Romero is a 78 year old female with a diagnosis of Stage IA2 presumed non-small cell lung cancer of the right upper lobe (no pathologic confirmation, patient declining biopsy) .     She was seen today for consideration of an additional course of SBRT to growing right lower lobe nodule.     HISTORY OF PRESENT ILLNESS:   Ms. Romero is a 78 year old female who initially had CT chest performed 4/23/2019 after a fall which demonstrated rib fractures related to trauma as well as a right upper lobe 6 mm groundglass opacity for which 6-month follow-up was recommended. Patient and sister-in-law do not recall this being discussed.    An evaluation for chest pain CTA was performed on 1/29/2021 and described and enlarging spiculated 1.7 cm right upper lobe pulmonary nodule. This is suspicious for a primary lung cancer. PET scan was recommended for further staging.     2/4/2021 PET/CT described a mildly FDG avid spiculated nodule in the right upper lobe measuring 1.6 x 1.1 cm (max SUV 2.1) concerning for right upper lobe adenocarcinoma with no evidence of metastases.     Patient met with medical oncology MD Barnhart and discussed concern of nodule representing a slow-growing adenocarcinoma. At that time patient opted for observation over surgical resection or stereotactic radiosurgery. Repeat imaging was performed 6/4/2021 CT chest and demonstrated irregular right upper lobe nodule measuring 1.2 x 1.7 cm increase in size and solid component seen since 2019 concerning for adenocarcinoma. She is also noted to have a stable 4 mm left upper lobe nodule. No lymphadenopathy.     Patient went  with pulmonology the [facility] 5/4/2021 (MD Gonzalez), and pulmonary function tests performed.    FEV1 1.06 L 46% predicted  FEV1/FVC C 60% DLCO 56%  Revealing severe obstructive impairment with mild and clinically significant improvement postbronchodilator. Diffusing capacity when corrected, normal.    She was told she is not a good candidate for surgical resection given FEV1 1.06 L and dyspnea with minimal exertion. They also discussed consideration of navigational bronchoscopy for biopsy without transthoracic approach to minimize risk of pneumothorax.  She is very concerned about the risks of biopsy. Procedure for biopsy worries her more than undergoing radiation therapy for nodule without confirmation of malignancy and proceed to stereotactic body radiation therapy without definitive diagnosis.    7/5/2021-7/9/2021 SBRT  Right upper lobe 5400 cGy in 3 fractions    Follow-up imaging with CT chest 10/7/2021, 2/11/2022, 6/10/2022, 10/21/2022, 2/17/2023:  All with posttreatment evolving changes in the right upper lobe.  Peribronchovascular nodule in the posterior right lower lobe <5-6 mm- slowly evolving    7/21/2023 CT chest: Stable lateral right upper lobe 10 x 10 mm nodule with posttreatment changes and surrounding lung.  Increase size bronchovascular nodule posterior right lower lobe measuring 7 x 9 mm-suspect for neoplasm and sufficiently large for characterization by PET-CT.  No new nodules.    No shortness of breath at rest but reports dyspnea with exertion.  The distance she is able to walk on flat surfaces is variable from day today and she has increased difficulty with incline or carrying things.     Discussed biopsy again with patient either transthoracic biopsy or navigational bronchoscopy with biopsy and she is very adverse to the idea of either.  She has had limited surgery and invasive interventions in her life and is not interested in any further invasive procedures.    Tobacco use she  reports this former she quit in January. She had previously attempted to quit multiple times in the past but resumed due to weight gain. She has a approximately 80- pack-year history (2 packs/day x 40 years).       CHEMOTHERAPY HISTORY: Concurrent Chemotherapy: No    RADIATION THERAPY HISTORY: Prior Radiation: Yes  SITE TREATED: Right upper lobe  TOTAL DOSE: 5400 cGy  NUMBER OF FRACTION: 3 fractions  DATES COMPLETED: 7/5/2021-7/9/2021    IMPLANTED CARDIAC DEVICE: none     PREGNANCY: N/A    Current Outpatient Medications   Medication Sig Dispense Refill    acetaminophen (TYLENOL) 500 MG tablet Take 500-1,000 mg by mouth every 8 hours as needed       albuterol (PROAIR HFA/PROVENTIL HFA/VENTOLIN HFA) 108 (90 Base) MCG/ACT inhaler Inhale 2 puffs into the lungs      aspirin (ASA) 81 MG EC tablet Take 81 mg by mouth daily      atorvastatin (LIPITOR) 40 MG tablet Take 40 mg by mouth At Bedtime      Cholecalciferol 250 MCG (98704 UT) TABS Take 10,000 Units by mouth daily      famotidine (PEPCID) 20 MG tablet Take 1 tablet (20 mg) by mouth 2 times daily 180 tablet 3    fluticasone (FLONASE) 50 MCG/ACT nasal spray Spray 2 sprays in nostril as needed      fluticasone-salmeterol (ADVAIR) 500-50 MCG/ACT inhaler Inhale 1 puff into the lungs every 12 hours 180 each 3    Fluticasone-Umeclidin-Vilanterol (TRELEGY ELLIPTA) 200-62.5-25 MCG/ACT oral inhaler Inhale 1 puff into the lungs daily      HEMP OIL OR EXTRACT OR OTHER CBD CANNABINOID, NOT MEDICAL CANNABIS, 1 gummy at HS for sleep-delta 9      IBANdronate (BONIVA) 150 MG tablet Take 150 mg by mouth every 30 days      lisinopril (ZESTRIL) 5 MG tablet Take 2.5 mg by mouth daily      melatonin 3 MG CAPS Take 1 capsule by mouth At Bedtime      Multiple Vitamin (MULTIVITAMIN ADULT PO) Take 1 tablet by mouth daily      nicotine polacrilex (NICORETTE) 4 MG gum Take 4 mg by mouth every hour as needed      nitroGLYcerin (NITRODUR) 0.2 MG/HR 24 hr patch Place 1 patch onto the skin daily 90  patch 3    tiotropium (SPIRIVA) 18 MCG inhaled capsule Inhale 1 capsule (18 mcg) into the lungs daily 90 capsule 3    nitroGLYcerin (NITROSTAT) 0.4 MG sublingual tablet For chest pain place 1 tablet under the tongue every 5 minutes for 3 doses. If symptoms persist 5 minutes after 1st dose call 911. (Patient not taking: Reported on 4/10/2023) 25 tablet 4     Past Medical History:   Diagnosis Date    COPD (chronic obstructive pulmonary disease) (H)     Coronary artery disease due to lipid rich plaque 2021    Abnormal coronary CTA    Family history of myocardial infarction     mother under age 60's  CVA and MI    High cholesterol     Hypertension     PVC's (premature ventricular contractions)     Syncope     Tubular adenoma of colon      Past Surgical History:   Procedure Laterality Date    APPENDECTOMY      SHOULDER SURGERY Right     TUBAL LIGATION       No Known Allergies  Family History   Problem Relation Age of Onset    Lung Cancer Father         lung    Breast Cancer Paternal Aunt     Cerebrovascular Disease Mother     Coronary Artery Disease Mother      Social History     Socioeconomic History    Marital status:      Spouse name: Not on file    Number of children: Not on file    Years of education: Not on file    Highest education level: Not on file   Occupational History    Not on file   Tobacco Use    Smoking status: Former     Types: Cigarettes     Quit date: 2019     Years since quittin.6    Smokeless tobacco: Never   Vaping Use    Vaping Use: Never used   Substance and Sexual Activity    Alcohol use: Not on file    Drug use: Not on file    Sexual activity: Not on file   Other Topics Concern    Parent/sibling w/ CABG, MI or angioplasty before 65F 55M? Not Asked   Social History Narrative    Not on file     Social Determinants of Health     Financial Resource Strain: Not on file   Food Insecurity: Not on file   Transportation Needs: Not on file   Physical Activity: Not on file   Stress: Not  "on file   Social Connections: Not on file   Intimate Partner Violence: Not on file   Housing Stability: Not on file        REVIEW OF SYMPTOMS:  A full 14-point review of systems was performed. Pertinent findings are noted in the HPI.    General  Constitutional  Constitutional (WDL): Exceptions to WDL  Fatigue: Fatigue relieved by rest  EENT  Eye Disorders  Eye Disorder (WDL): Exceptions to WDL (wears glasses)  Blurred Vision: Intervention not indicated (cataracts)  Ear Disorders  Ear Disorder (WDL): Exceptions to WDL  Tinnitus: Mild symptoms OR intervention not indicated (right ear \"sizzling\")  Respiratory  Respiratory  Respiratory (WDL): Exceptions to WDL  Cough: Mild symptoms OR nonprescription intervention indicated  Dyspnea: Shortness of breath with moderate exertion  Cardiovascular  Cardiovascular  Cardiovascular (WDL): Exceptions to WDL (no pacemaker)  Palpitations: Mild symptoms OR intervention not indicated (occasional racing HR)  Edema: Yes  Edema Limbs: 5 - 10% inter-limb discrepancy in volume or circumference at point of greatest visible difference OR swelling or obscuration of anatomic architecture on close inspection (bilateral LE's)  Gastrointestinal  Gastrointestinal  Gastrointestinal (WDL): Exceptions to WDL  Dysgeusia: Altered taste but no change in diet  Constipation: Occasional or intermittent symptoms OR occasional use of stool softeners, laxatives, dietary modification, or enema  Musculoskeletal  Musculoskeletal and Connective Tissue Disorders  Musculoskeletal & Connective (WDL): All musculoskeletal & connective elements are within defined limits  Integumentary  Integumentary  Integumentary (WDL): All integumentary elements are within defined limits  Neurological  Neurosensory  Neurosensory (WDL): Exceptions to WDL  Peripheral Motor Neuropathy: Asymptomatic OR clinical or diagnostic observations only (feet)  Ataxia: Asymptomatic OR clinical or diagnostic observations only OR intervention not " indicated  Peripheral Sensory Neuropathy: Asymptomatic (feet)  Dizziness: Mild unsteadiness or sensation of movement (due to medication)  Genitourinary/Reproductive  Genitourinary  Genitourinary (WDL): All genitourinary elements are within defined limits  Lymphatic  Lymph System Disorders  Lymph (WDL): All lymph elements are within defined limits  Pain  Pain Score: No Pain (0)  AUA Assessment                                                              Accompanied by  Accompanied By: self only    ECOG Status: 1 - Can't do physically strenuous work, but fully ambyulatory and can do light sedentary work    KPS Score: 80% Can perform normal activity with effort, some signs of disease    Recent Labs: No results found for this or any previous visit (from the past 168 hour(s)).    Imaging: Imaging results 6 weeks:MR External Imaging Brain    Result Date: 8/15/2023  Images were obtained from an external facility.  Click PACS Images hyperlink to view images.  Textual results have been scanned into the media tab.    CT Chest w/o contrast    Result Date: 7/21/2023  EXAM: CT CHEST W/O CONTRAST LOCATION: [facility] DATE: 7/21/2023 INDICATION: Lung cancer, current or r o recurrence; Lung cancer surveillance; No known automatically detected potential contraindications to imaging COMPARISON: CT of the chest 02/17/2023, 10/21/2022 TECHNIQUE: CT chest without IV contrast. Multiplanar reformats were obtained. Dose reduction techniques were used. CONTRAST: None. FINDINGS: LUNGS AND PLEURA: 10 x 10 mm solid nodule lateral right upper lobe is stable (series 5, image 90). Surrounding parenchyma has a combination of consolidative/groundglass opacity and interstitial thickening with localized volume loss consistent with posttreatment cicatrization. A perihilar bronchovascular nodule in the posterior basal right lower lobe is slightly larger measuring 7 x 9 mm (series 5, image 177). No new lung nodules. Advanced  emphysema and related hyperinflation. No pleural effusion, thickening, or nodularity. MEDIASTINUM: Cardiac chambers are normal in size. No pericardial effusion. Nonaneurysmal thoracic aorta. Moderate arch, great vessel, and descending aorta atheromatous calcifications. No enlarged mediastinal or hilar lymph nodes. Esophagus is decompressed. CORONARY ARTERY CALCIFICATION: Moderate. UPPER ABDOMEN: Stable benign cortical cyst posterior right kidney does not require specific workup or follow-up. No new findings in the imaged upper abdomen. MUSCULOSKELETAL: Disc space narrowing and small marginal osteophytes throughout the thoracic spine. No aggressive or destructive bone lesions.     IMPRESSION: 1.  Stable 10 mm nodule in the right upper lobe laterally with surrounding cicatrization from treatment. 2.  Solid peribronchovascular nodule in the right lower lobe is slightly larger and suspect for neoplasm. The nodule is sufficiently large for characterization by PET-CT.      Pathology:   No results found for this or any previous visit (from the past 8760 hour(s)).              Objective:          PHYSICAL EXAMINATION:    BP (!) 173/77 (BP Location: Right arm, Patient Position: Sitting, Cuff Size: Adult Regular)   Pulse 74   Temp 97.8  F (36.6  C) (Oral)   Resp 16   Wt 68.5 kg (151 lb 1.6 oz)   SpO2 96%   BMI 24.39 kg/m      Gen: Alert, in NAD pleasant interactive, well nourished  Eyes: EOMI, sclera anicteric  HENT     Head: NC/AT  Pulm: No wheezing, stridor or respiratory distress  CV: Well-perfused, no cyanosis  Musculoskeletal: Normal muscle bulk and tone  Skin: Normal tone and turgor, warm, dry, intact  Neurologic: A/Ox3, face symmetric, speech fluent, no focal motor deficits. Gait normal and unaided  Psychiatric: Appropriate mood and affect     Intent of Therapy: Curative  Side effects that may occur during or within weeks after Radiation Therapy    Fatigue and general weakness   Darkening, irritation, itchiness,  redness, dryness and peeling of the skin of the chest  Loss of chest and armpit hair  Painful swallowing limiting solid and liquid intake and causing dehydration  Nausea, vomiting and decrease in appetite    Side effects that may occur months or years after Radiation Therapy     Development of another tumor or cancer  Lung inflammation or fibrosis causing cough, fever, and shortness of breath   Fracture of ribs  Permanent narrowing or obstruction of the esophagus  Fluid compressing the lung (pleural effusion)  Coronary artery blockage causing angina pain or a heart attack  Thickening, telangiectasias (development of spider like blood vessels in the skin) and ulceration of the skin of the chest  Poor healing after a trauma or surgery in the irradiated areas  Nerve damage resulting in loss of strength or sensation    The risks, benefits and alternatives to radiation therapy were outlined with the patient. All questions were answered and a consent was signed.                      Impression   78-year-old woman with stage IA2 presumed non-small cell lung cancer of the right upper lobe (no pathologic confirmation, patient declining biopsy) status post SBRT  New slowly growing progressive nodule in the right lower lobe visible and growing since 2022 now measuring up to 9 mm.    Malignant neoplasm of upper lobe of right lung (H) [C34.11]   Malignant neoplasm of lower lobe of right lung (H) [C34.31]    Cancer Staging   No matching staging information was found for the patient.    Assessment & Plan:   Discussed definitive radiation therapy with patient.  The indications for, process of, alternatives, potential side effects and complications of SBRT to the growing nodule highly suspicious for NSCLC were discussed.    She asked multiple questions which were answered to her satisfaction and they verbalized understanding.      PET/CT ordered for staging--we will discuss results.  Assuming concern for another stage I non-small  cell lung cancer, patient interested in proceeding with SBRT to the right lower lobe without confirmatory biopsy which she is not interested in.    She wishes to return to clinic following PET/CT if proceeding with SBRT for requires CT simulation for RT planning.   If proceeding with SBRT anticipate 5400 cGy in 3 fractions.    Addendum:  Reviewed PET/CT images as well as results with patient by phone  IMPRESSION:     1. FDG avid nodule in the lateral right upper lobe with surrounding FDG avid airspace opacity. While this may represent evolving posttreatment change, the paradoxical increase in metabolic activity of the right upper lobe nodule is somewhat atypical and   warrants continued imaging surveillance as this could represent early disease recurrence.     2. Findings suspicious for a synchronous early right lower lobe primary lung adenocarcinoma without metastasis.    She would like to proceed with SBRT to the growing nodule in the right lower lobe suspicious for synchronous non-small cell lung cancer without evidence of metastasis.  We will need to follow previously treated right upper lobe nodule as well.  In addition there is an additional hypermetabolic nodule that may also be inflammatory and given timeline to appear will hopefully be improving by the time of CT simulation.    We will arrange return to clinic for consent and CT simulation.  She is only interested in treatment at [facility] as it is very difficult for her to get to [facility].    Thank you for allowing me to participate in the care of this patient. Feel free to contact me with all questions or concerns.      Sincerely,      Jessica Fong MD  Department of Radiation Oncology   [facility] Radiation Oncology  Tel: [phone]  Page: [phone]    [facility]  [address] [place] [address]     [place] [id number] [facility] [place] [address]    CC:  Patient Care Team:  Timoteo Ogden MD as PCP -  General  Timoteo Barnhart MD as MD (Hematology & Oncology)  Maureen Friedman, RN as Specialty Care Coordinator (Hematology & Oncology)  Timoteo Barnhart MD as Assigned Cancer Care Provider  Shivam Denise MD as Assigned Heart and Vascular Provider  Abi Bailey MD as Assigned Pulmonology Provider

## 2023-08-25 NOTE — PROGRESS NOTES
"No pacemaker  Prior radiation therapy treatment to the lung at [facility]  Postmenopausal    Oncology Rooming Note    August 25, 2023 2:39 PM   Shantel Romero is a 78 year old female who presents for:    Chief Complaint   Patient presents with    Oncology Clinic Visit     Consult with Dr. Fong     Initial Vitals: BP (!) 173/77 (BP Location: Right arm, Patient Position: Sitting, Cuff Size: Adult Regular)   Pulse 74   Temp 97.8  F (36.6  C) (Oral)   Resp 16   Wt 68.5 kg (151 lb 1.6 oz)   SpO2 96%   BMI 24.39 kg/m   Estimated body mass index is 24.39 kg/m  as calculated from the following:    Height as of 7/24/23: 1.676 m (5' 6\").    Weight as of this encounter: 68.5 kg (151 lb 1.6 oz). Body surface area is 1.79 meters squared.  No Pain (0) Comment: Data Unavailable   No LMP recorded.  Allergies reviewed: Yes  Medications reviewed: Yes    Medications: Medication refills not needed today.  Pharmacy name entered into [email or web address] [facility][id number] [address] AT [facility]    Clinical concerns: Patient here ambulatory for consult for radiation therapy for her lung cancer.  Patient has a new right upper lobe mass to be evaluated for possible treatment.  Patient declined written information at this time as she has had prior radiation with us for the lung.  Seen by Dr. Fong.  Plan return to clinic for follow-up and/or CT simulation as directed by provider.   Dr. Fong was notified.      Teri Goss RN              "

## 2023-08-25 NOTE — LETTER
8/25/2023         RE: Shantel Romero  [address]  [place] [address]        Dear Colleague,    Thank you for referring your patient, Shantel Romero, to the [facility] RADIATION ONCOLOGY [place]. Please see a copy of my visit note below.    [facility] Radiation Oncology Consult Note     Patient: Shantel Romero  MRN: [id number]  Date of Service: 08/25/2023          Timoteo Barnhart MD  [address] [place] [address]       Dear Dr. Barnhart:    Thank you very much for referring this patient for consideration of radiotherapy. As you know Ms. Romero is a 78 year old female with a diagnosis of Stage IA2 presumed non-small cell lung cancer of the right upper lobe (no pathologic confirmation, patient declining biopsy) .     She was seen today for consideration of an additional course of SBRT to growing right lower lobe nodule.     HISTORY OF PRESENT ILLNESS:   Ms. Romero is a 78 year old female who initially had CT chest performed 4/23/2019 after a fall which demonstrated rib fractures related to trauma as well as a right upper lobe 6 mm groundglass opacity for which 6-month follow-up was recommended. Patient and sister-in-law do not recall this being discussed.    An evaluation for chest pain CTA was performed on 1/29/2021 and described and enlarging spiculated 1.7 cm right upper lobe pulmonary nodule. This is suspicious for a primary lung cancer. PET scan was recommended for further staging.     2/4/2021 PET/CT described a mildly FDG avid spiculated nodule in the right upper lobe measuring 1.6 x 1.1 cm (max SUV 2.1) concerning for right upper lobe adenocarcinoma with no evidence of metastases.     Patient met with medical oncology MD Barnhart and discussed concern of nodule representing a slow-growing adenocarcinoma. At that time patient opted for observation over surgical resection or stereotactic radiosurgery. Repeat imaging was performed 6/4/2021  CT chest and demonstrated irregular right upper lobe nodule measuring 1.2 x 1.7 cm increase in size and solid component seen since 2019 concerning for adenocarcinoma. She is also noted to have a stable 4 mm left upper lobe nodule. No lymphadenopathy.     Patient went with pulmonology the [facility] 5/4/2021 (MD Gonzalez), and pulmonary function tests performed.    FEV1 1.06 L 46% predicted  FEV1/FVC C 60% DLCO 56%  Revealing severe obstructive impairment with mild and clinically significant improvement postbronchodilator. Diffusing capacity when corrected, normal.    She was told she is not a good candidate for surgical resection given FEV1 1.06 L and dyspnea with minimal exertion. They also discussed consideration of navigational bronchoscopy for biopsy without transthoracic approach to minimize risk of pneumothorax.  She is very concerned about the risks of biopsy. Procedure for biopsy worries her more than undergoing radiation therapy for nodule without confirmation of malignancy and proceed to stereotactic body radiation therapy without definitive diagnosis.    7/5/2021-7/9/2021 SBRT  Right upper lobe 5400 cGy in 3 fractions    Follow-up imaging with CT chest 10/7/2021, 2/11/2022, 6/10/2022, 10/21/2022, 2/17/2023:  All with posttreatment evolving changes in the right upper lobe.  Peribronchovascular nodule in the posterior right lower lobe <5-6 mm- slowly evolving    7/21/2023 CT chest: Stable lateral right upper lobe 10 x 10 mm nodule with posttreatment changes and surrounding lung.  Increase size bronchovascular nodule posterior right lower lobe measuring 7 x 9 mm-suspect for neoplasm and sufficiently large for characterization by PET-CT.  No new nodules.    No shortness of breath at rest but reports dyspnea with exertion.  The distance she is able to walk on flat surfaces is variable from day today and she has increased difficulty with incline or carrying things.     Discussed biopsy again with  patient either transthoracic biopsy or navigational bronchoscopy with biopsy and she is very adverse to the idea of either.  She has had limited surgery and invasive interventions in her life and is not interested in any further invasive procedures.    Tobacco use she reports this former she quit in January. She had previously attempted to quit multiple times in the past but resumed due to weight gain. She has a approximately 80- pack-year history (2 packs/day x 40 years).       CHEMOTHERAPY HISTORY: Concurrent Chemotherapy: No    RADIATION THERAPY HISTORY: Prior Radiation: Yes  SITE TREATED: Right upper lobe  TOTAL DOSE: 5400 cGy  NUMBER OF FRACTION: 3 fractions  DATES COMPLETED: 7/5/2021-7/9/2021    IMPLANTED CARDIAC DEVICE: none     PREGNANCY: N/A    Current Outpatient Medications   Medication Sig Dispense Refill     acetaminophen (TYLENOL) 500 MG tablet Take 500-1,000 mg by mouth every 8 hours as needed        albuterol (PROAIR HFA/PROVENTIL HFA/VENTOLIN HFA) 108 (90 Base) MCG/ACT inhaler Inhale 2 puffs into the lungs       aspirin (ASA) 81 MG EC tablet Take 81 mg by mouth daily       atorvastatin (LIPITOR) 40 MG tablet Take 40 mg by mouth At Bedtime       Cholecalciferol 250 MCG (65834 UT) TABS Take 10,000 Units by mouth daily       famotidine (PEPCID) 20 MG tablet Take 1 tablet (20 mg) by mouth 2 times daily 180 tablet 3     fluticasone (FLONASE) 50 MCG/ACT nasal spray Spray 2 sprays in nostril as needed       fluticasone-salmeterol (ADVAIR) 500-50 MCG/ACT inhaler Inhale 1 puff into the lungs every 12 hours 180 each 3     Fluticasone-Umeclidin-Vilanterol (TRELEGY ELLIPTA) 200-62.5-25 MCG/ACT oral inhaler Inhale 1 puff into the lungs daily       HEMP OIL OR EXTRACT OR OTHER CBD CANNABINOID, NOT MEDICAL CANNABIS, 1 gummy at HS for sleep-delta 9       IBANdronate (BONIVA) 150 MG tablet Take 150 mg by mouth every 30 days       lisinopril (ZESTRIL) 5 MG tablet Take 2.5 mg by mouth daily       melatonin 3 MG CAPS  Take 1 capsule by mouth At Bedtime       Multiple Vitamin (MULTIVITAMIN ADULT PO) Take 1 tablet by mouth daily       nicotine polacrilex (NICORETTE) 4 MG gum Take 4 mg by mouth every hour as needed       nitroGLYcerin (NITRODUR) 0.2 MG/HR 24 hr patch Place 1 patch onto the skin daily 90 patch 3     tiotropium (SPIRIVA) 18 MCG inhaled capsule Inhale 1 capsule (18 mcg) into the lungs daily 90 capsule 3     nitroGLYcerin (NITROSTAT) 0.4 MG sublingual tablet For chest pain place 1 tablet under the tongue every 5 minutes for 3 doses. If symptoms persist 5 minutes after 1st dose call 911. (Patient not taking: Reported on 4/10/2023) 25 tablet 4     Past Medical History:   Diagnosis Date     COPD (chronic obstructive pulmonary disease) (H)      Coronary artery disease due to lipid rich plaque 2021    Abnormal coronary CTA     Family history of myocardial infarction     mother under age 60's  CVA and MI     High cholesterol      Hypertension      PVC's (premature ventricular contractions)      Syncope      Tubular adenoma of colon      Past Surgical History:   Procedure Laterality Date     APPENDECTOMY       SHOULDER SURGERY Right      TUBAL LIGATION       No Known Allergies  Family History   Problem Relation Age of Onset     Lung Cancer Father         lung     Breast Cancer Paternal Aunt      Cerebrovascular Disease Mother      Coronary Artery Disease Mother      Social History     Socioeconomic History     Marital status:      Spouse name: Not on file     Number of children: Not on file     Years of education: Not on file     Highest education level: Not on file   Occupational History     Not on file   Tobacco Use     Smoking status: Former     Types: Cigarettes     Quit date:      Years since quittin.6     Smokeless tobacco: Never   Vaping Use     Vaping Use: Never used   Substance and Sexual Activity     Alcohol use: Not on file     Drug use: Not on file     Sexual activity: Not on file   Other  "Topics Concern     Parent/sibling w/ CABG, MI or angioplasty before 65F 55M? Not Asked   Social History Narrative     Not on file     Social Determinants of Health     Financial Resource Strain: Not on file   Food Insecurity: Not on file   Transportation Needs: Not on file   Physical Activity: Not on file   Stress: Not on file   Social Connections: Not on file   Intimate Partner Violence: Not on file   Housing Stability: Not on file        REVIEW OF SYMPTOMS:  A full 14-point review of systems was performed. Pertinent findings are noted in the HPI.    General  Constitutional  Constitutional (WDL): Exceptions to WDL  Fatigue: Fatigue relieved by rest  EENT  Eye Disorders  Eye Disorder (WDL): Exceptions to WDL (wears glasses)  Blurred Vision: Intervention not indicated (cataracts)  Ear Disorders  Ear Disorder (WDL): Exceptions to WDL  Tinnitus: Mild symptoms OR intervention not indicated (right ear \"sizzling\")  Respiratory  Respiratory  Respiratory (WDL): Exceptions to WDL  Cough: Mild symptoms OR nonprescription intervention indicated  Dyspnea: Shortness of breath with moderate exertion  Cardiovascular  Cardiovascular  Cardiovascular (WDL): Exceptions to WDL (no pacemaker)  Palpitations: Mild symptoms OR intervention not indicated (occasional racing HR)  Edema: Yes  Edema Limbs: 5 - 10% inter-limb discrepancy in volume or circumference at point of greatest visible difference OR swelling or obscuration of anatomic architecture on close inspection (bilateral LE's)  Gastrointestinal  Gastrointestinal  Gastrointestinal (WDL): Exceptions to WDL  Dysgeusia: Altered taste but no change in diet  Constipation: Occasional or intermittent symptoms OR occasional use of stool softeners, laxatives, dietary modification, or enema  Musculoskeletal  Musculoskeletal and Connective Tissue Disorders  Musculoskeletal & Connective (WDL): All musculoskeletal & connective elements are within defined " limits  Integumentary  Integumentary  Integumentary (WDL): All integumentary elements are within defined limits  Neurological  Neurosensory  Neurosensory (WDL): Exceptions to WDL  Peripheral Motor Neuropathy: Asymptomatic OR clinical or diagnostic observations only (feet)  Ataxia: Asymptomatic OR clinical or diagnostic observations only OR intervention not indicated  Peripheral Sensory Neuropathy: Asymptomatic (feet)  Dizziness: Mild unsteadiness or sensation of movement (due to medication)  Genitourinary/Reproductive  Genitourinary  Genitourinary (WDL): All genitourinary elements are within defined limits  Lymphatic  Lymph System Disorders  Lymph (WDL): All lymph elements are within defined limits  Pain  Pain Score: No Pain (0)  AUA Assessment                                                              Accompanied by  Accompanied By: self only    ECOG Status: 1 - Can't do physically strenuous work, but fully ambyulatory and can do light sedentary work    KPS Score: 80% Can perform normal activity with effort, some signs of disease    Recent Labs: No results found for this or any previous visit (from the past 168 hour(s)).    Imaging: Imaging results 6 weeks:MR External Imaging Brain    Result Date: 8/15/2023  Images were obtained from an external facility.  Click PACS Images hyperlink to view images.  Textual results have been scanned into the media tab.    CT Chest w/o contrast    Result Date: 7/21/2023  EXAM: CT CHEST W/O CONTRAST LOCATION: [facility] DATE: 7/21/2023 INDICATION: Lung cancer, current or r o recurrence; Lung cancer surveillance; No known automatically detected potential contraindications to imaging COMPARISON: CT of the chest 02/17/2023, 10/21/2022 TECHNIQUE: CT chest without IV contrast. Multiplanar reformats were obtained. Dose reduction techniques were used. CONTRAST: None. FINDINGS: LUNGS AND PLEURA: 10 x 10 mm solid nodule lateral right upper lobe is stable (series  5, image 90). Surrounding parenchyma has a combination of consolidative/groundglass opacity and interstitial thickening with localized volume loss consistent with posttreatment cicatrization. A perihilar bronchovascular nodule in the posterior basal right lower lobe is slightly larger measuring 7 x 9 mm (series 5, image 177). No new lung nodules. Advanced emphysema and related hyperinflation. No pleural effusion, thickening, or nodularity. MEDIASTINUM: Cardiac chambers are normal in size. No pericardial effusion. Nonaneurysmal thoracic aorta. Moderate arch, great vessel, and descending aorta atheromatous calcifications. No enlarged mediastinal or hilar lymph nodes. Esophagus is decompressed. CORONARY ARTERY CALCIFICATION: Moderate. UPPER ABDOMEN: Stable benign cortical cyst posterior right kidney does not require specific workup or follow-up. No new findings in the imaged upper abdomen. MUSCULOSKELETAL: Disc space narrowing and small marginal osteophytes throughout the thoracic spine. No aggressive or destructive bone lesions.     IMPRESSION: 1.  Stable 10 mm nodule in the right upper lobe laterally with surrounding cicatrization from treatment. 2.  Solid peribronchovascular nodule in the right lower lobe is slightly larger and suspect for neoplasm. The nodule is sufficiently large for characterization by PET-CT.      Pathology:   No results found for this or any previous visit (from the past 8760 hour(s)).              Objective:          PHYSICAL EXAMINATION:    BP (!) 173/77 (BP Location: Right arm, Patient Position: Sitting, Cuff Size: Adult Regular)   Pulse 74   Temp 97.8  F (36.6  C) (Oral)   Resp 16   Wt 68.5 kg (151 lb 1.6 oz)   SpO2 96%   BMI 24.39 kg/m      Gen: Alert, in NAD pleasant interactive, well nourished  Eyes: EOMI, sclera anicteric  HENT     Head: NC/AT  Pulm: No wheezing, stridor or respiratory distress  CV: Well-perfused, no cyanosis  Musculoskeletal: Normal muscle bulk and tone  Skin:  Normal tone and turgor, warm, dry, intact  Neurologic: A/Ox3, face symmetric, speech fluent, no focal motor deficits. Gait normal and unaided  Psychiatric: Appropriate mood and affect     Intent of Therapy: Curative  Side effects that may occur during or within weeks after Radiation Therapy    Fatigue and general weakness   Darkening, irritation, itchiness, redness, dryness and peeling of the skin of the chest  Loss of chest and armpit hair  Painful swallowing limiting solid and liquid intake and causing dehydration  Nausea, vomiting and decrease in appetite    Side effects that may occur months or years after Radiation Therapy     Development of another tumor or cancer  Lung inflammation or fibrosis causing cough, fever, and shortness of breath   Fracture of ribs  Permanent narrowing or obstruction of the esophagus  Fluid compressing the lung (pleural effusion)  Coronary artery blockage causing angina pain or a heart attack  Thickening, telangiectasias (development of spider like blood vessels in the skin) and ulceration of the skin of the chest  Poor healing after a trauma or surgery in the irradiated areas  Nerve damage resulting in loss of strength or sensation    The risks, benefits and alternatives to radiation therapy were outlined with the patient. All questions were answered and a consent was signed.                      Impression   78-year-old woman with stage IA2 presumed non-small cell lung cancer of the right upper lobe (no pathologic confirmation, patient declining biopsy) status post SBRT  New slowly growing progressive nodule in the right lower lobe visible and growing since 2022 now measuring up to 9 mm.    Malignant neoplasm of upper lobe of right lung (H) [C34.11]   Malignant neoplasm of lower lobe of right lung (H) [C34.31]    Cancer Staging   No matching staging information was found for the patient.    Assessment & Plan:   Discussed definitive radiation therapy with patient.  The indications  for, process of, alternatives, potential side effects and complications of SBRT to the growing nodule highly suspicious for NSCLC were discussed.    She asked multiple questions which were answered to her satisfaction and they verbalized understanding.      PET/CT ordered for staging--we will discuss results.  Assuming concern for another stage I non-small cell lung cancer, patient interested in proceeding with SBRT to the right lower lobe without confirmatory biopsy which she is not interested in.    She wishes to return to clinic following PET/CT if proceeding with SBRT for requires CT simulation for RT planning.   If proceeding with SBRT anticipate 5400 cGy in 3 fractions.      Thank you for allowing me to participate in the care of this patient. Feel free to contact me with all questions or concerns.      Sincerely,      Jessica Fong MD  Department of Radiation Oncology   [facility] Radiation Oncology  Tel: [phone]  Page: [phone]    [facility]  [address] [place] [address]     [address]    [place] [address]    CC:  Patient Care Team:  Timoteo Ogden MD as PCP - General  Timoteo Barnhart MD as MD (Hematology & Oncology)  Maureen Friedman, RN as Specialty Care Coordinator (Hematology & Oncology)  Timoteo Barnhart MD as Assigned Cancer Care Provider  Shivam Denise MD as Assigned Heart and Vascular Provider  Abi Bailey MD as Assigned Pulmonology Provider          No pacemaker  Prior radiation therapy treatment to the lung at [facility]  Postmenopausal    Oncology Rooming Note    August 25, 2023 2:39 PM   Shantel Romero is a 78 year old female who presents for:    Chief Complaint   Patient presents with     Oncology Clinic Visit     Consult with Dr. Fong     Initial Vitals: BP (!) 173/77 (BP Location: Right arm, Patient Position: Sitting, Cuff Size: Adult Regular)   Pulse 74   Temp 97.8  F (36.6  C) (Oral)   Resp 16   Wt 68.5 kg (151  "lb 1.6 oz)   SpO2 96%   BMI 24.39 kg/m   Estimated body mass index is 24.39 kg/m  as calculated from the following:    Height as of 7/24/23: 1.676 m (5' 6\").    Weight as of this encounter: 68.5 kg (151 lb 1.6 oz). Body surface area is 1.79 meters squared.  No Pain (0) Comment: Data Unavailable   No LMP recorded.  Allergies reviewed: Yes  Medications reviewed: Yes    Medications: Medication refills not needed today.  Pharmacy name entered into [email or web address]: [id number] - [place], [place] - [address] AT [facility]    Clinical concerns: Patient here ambulatory for consult for radiation therapy for her lung cancer.  Patient has a new right upper lobe mass to be evaluated for possible treatment.  Patient declined written information at this time as she has had prior radiation with us for the lung.  Seen by Dr. Fong.  Plan return to clinic for follow-up and/or CT simulation as directed by provider.   Dr. Fong was notified.      Teri Goss RN                Again, thank you for allowing me to participate in the care of your patient.        Sincerely,        Jessica Fong MD  "

## 2023-08-30 ENCOUNTER — OFFICE VISIT (OUTPATIENT)
Dept: CARDIOLOGY | Facility: CLINIC | Age: 78
End: 2023-08-30
Attending: INTERNAL MEDICINE
Payer: MEDICARE

## 2023-08-30 VITALS
SYSTOLIC BLOOD PRESSURE: 126 MMHG | HEART RATE: 74 BPM | DIASTOLIC BLOOD PRESSURE: 82 MMHG | WEIGHT: 149.2 LBS | RESPIRATION RATE: 16 BRPM | BODY MASS INDEX: 24.08 KG/M2 | OXYGEN SATURATION: 96 %

## 2023-08-30 DIAGNOSIS — E78.5 HYPERLIPIDEMIA LDL GOAL <100: Primary | ICD-10-CM

## 2023-08-30 DIAGNOSIS — E78.5 HYPERLIPIDEMIA LDL GOAL <70: ICD-10-CM

## 2023-08-30 PROCEDURE — 99214 OFFICE O/P EST MOD 30 MIN: CPT | Performed by: INTERNAL MEDICINE

## 2023-08-30 RX ORDER — EZETIMIBE 10 MG/1
10 TABLET ORAL DAILY
Qty: 90 TABLET | Refills: 4 | Status: SHIPPED | OUTPATIENT
Start: 2023-08-30 | End: 2024-02-01

## 2023-08-30 NOTE — PATIENT INSTRUCTIONS
We are going to add Zetia 10mg as an extra cholesterol pill to lower your cholesterol further.Usually its very well tolerated.If you have an side effects please let me know.Please arrange a fasting cholesterol panel and a liver panel when you see your primary in October and ask that they forward me the results.Please call my nurse Chica at [phone] for any questions but also to tell me when the blood work is completed.Please let me know if you have more concerns about a faster heart rate and we can talk further about a monitor.

## 2023-08-30 NOTE — PROGRESS NOTES
HEART CARE ENCOUNTER CONSULTATON NOTE      [facility]  [phone]      Assessment/Recommendations   Assessment/Plan:  1. Coronary artery disease.  She has done well on low-dose nitroglycerin patch.  She had a CT angiogram that demonstrated significant elevated coronary calcium score 507 in 2021 but no obstructive coronary artery disease noted at that time.  She has had ongoing improvement in epigastric discomfort with Pepcid.  Nuclear stress test was completed August 2022 which was interpreted as negative for ischemia normal ejection fraction.    2.  Hypertension.  She had previously described some woozy sensation at which time lisinopril dose was decreased to 2.5 mg daily.  Blood pressure today is reasonable at 126/82.  He does have an appointment with primary care in October 2023.  It is noted however that when she saw oncology her blood pressure was higher.  Does monitor her blood pressure and will continue to monitor her blood pressure.  She states that at night when she checks her blood pressure her pulse sometimes is in the  range.  He does indicate that she gets anxious sometimes.  We discussed whether we would repeat a monitor but at this point she prefers not to pursue.    3.  Patient previously mentioned awareness of a panic sensation when she goes to bed at night.  She was not certain whether her heart was racing.  She had a Holter monitor March 2021 that demonstrated mild increase in atrial ectopy.  We discussed whether we would repeat the monitor but at this point her preference is to hold on additional monitoring.  We will keep me updated with any worsening symptoms or change in vital signs.    4.  Dyslipidemia.  Cholesterol numbers from 2023 showed a cholesterol 184, glycerides of 46, LDL 80.  We discussed that with her elevated coronary calcium score ideally would like to see her LDL less than 70.  We talked about up titration of atorvastatin or adding Zetia.  She would  like to add Zetia 10 mg daily and would like to have her blood work in October when she sees her primary care physician.  I will place this in our chart record and wrote a note to her to remind her to discuss this with her primary care.    5.  Lung cancer.  Chart records reviewed.  Upcoming PET scan and radiation therapy.  Underlying significant emphysema on prior CT scan.  6.  Diminished pulses in the lower extremities.  No symptoms of claudication or nonhealing ulcers.  Currently she is overwhelmed by the diagnosis of progression of lung cancer.  Consideration could be given to ankle-brachial indices can discuss this on follow-up or discussed with her primary care physician.  I am very pleased to hear that she has discontinued tobacco.  She has had screening for abdominal aortic aneurysm with a CT of the chest abdomen in 2022 reporting atherosclerotic plaque but no aneurysm.    1.  Add Zetia 10 mg daily.  2.  Patient has an appointment with primary care in October and we will request a fasting lipid panel AST and ALT copy sent to me.  3.  Follow-up in 6 months       History of Present Illness/Subjective    HPI: Shantel Romero is a 78 year old female who is seen in follow-up.  We last visited January 2023.  At that time she had been feeling well and the epigastric discomfort remained persistently improved with famotidine.  I have reviewed recent notes from oncology and radiation oncology.  He was diagnosed with a right-sided lung cancer had a recent PET scan performed based on the chart reviewed.  There was a small nodule of the right lower lobe which enlarged over the past 6 months reportedly measuring 7 x 9 mm.    She reports that the epigastric discomfort continues to be significantly improved.  He is not describing any clear-cut symptoms of chest discomfort.  She does have mild breathlessness with exertion.  She reports that her heart rates trend in the 90s to low 100s at night when she checks her blood  pressure and pulse no significant dizziness or palpitations.  At this point she prefers not to repeat a monitor.  She did wear a Holter monitor in March 2021.  This demonstrated some atrial ectopy but no complex ectopy noted.  Mild sinus tachycardia noted.    Recent Echocardiogram Results:  cathi abnormal data Echocardiogram Complete  Order: [id number]  Status: Final result       Visible to patient: Yes (not seen)       Next appt: 08/30/2023 at 01:20 PM in Cardiology (Shivam Denise MD)    0 Result Notes  Details    Reading Physician Reading Date Result Priority   Shivam Denise MD  [phone] 1/30/2021 Routine   Provider, Historical 1/30/2021      Narrative & Impression    No previous study for comparison.    Normal left ventricular size. Mild left ventricular hypertrophy    Left ventricle ejection fraction is normal. The calculated left ventricular ejection fraction is 67%.    Possible mild right ventricular enlargement.    Normal right ventricular systolic function.    Mild right atrial enlargement.    Aortic valve sclerosis without evidence for significant aortic stenosis. Mild aortic insufficiency.    Trace pericardial effusion suggested          Recent Coronary Angiogram Results:    Exam Date Exam Time Exam Date Exam Time Accession # Performing Department Results    2/1/21 12:00 AM 2/1/21 12:01 PM [id number] [facility]        [id number]       PACS Images     Show images for CTA Angiogram coronary artery     Study Result    Narrative & Impression     The total Agatston calcium score is 507. A calcium score in this range places the individual in the 100th percentile when compared to an age and gender matched control group and implies a very high risk of cardiac events in the next ten years.    Right dominant coronary artery system.    Mild nonobstructive coronary artery disease in the left main, left anterior descending artery and circumflex artery.          NM  Lexiscan stress test  Order: [id number]  Status: Final result       Visible to patient: Yes (seen)       Next appt: 08/30/2023 at 01:20 PM in Cardiology (Shivam Denise MD)       Dx: Coronary artery disease due to lipid ...    2 Result Notes       1 Patient Communication  Details    Reading Physician Reading Date Result Priority   Jadon Harrington, DO  [phone] 8/18/2022 Routine   Jadon Harrington, DO  [phone] 8/18/2022      Result Text       The nuclear stress test is negative for inducible myocardial ischemia or infarction. The left ventricular ejection fraction at rest is 75%. Left ventricular function is normal.    A prior study was conducted on 5/23/2017.            Review of Systems   126/82, weight 149 pounds, rate of 74 and regular  HEENT exam unremarkable.  Lungs: This breath sounds, slight expiratory wheeze,  Cor, heart tones are distant, regular rhythm is noted  Abdomen, soft and nontender  Extremities, trace edema at the ankles.  Pulses are diminished in the dorsalis pedis and posterior tibial distribution.  Neuro: Focal deficits.    Please refer above for cardiac ROS details.        Medical History  Surgical History Family History Social History   Past Medical History:   Diagnosis Date    COPD (chronic obstructive pulmonary disease) (H)     Coronary artery disease due to lipid rich plaque 02/2021    Abnormal coronary CTA    Family history of myocardial infarction     mother under age 60's  CVA and MI    High cholesterol     Hypertension     PVC's (premature ventricular contractions)     Syncope 2012    Tubular adenoma of colon      Past Surgical History:   Procedure Laterality Date    APPENDECTOMY      SHOULDER SURGERY Right     TUBAL LIGATION       Family History   Problem Relation Age of Onset    Lung Cancer Father         lung    Breast Cancer Paternal Aunt     Cerebrovascular Disease Mother     Coronary Artery Disease Mother         Social History     Socioeconomic History     Marital status:      Spouse name: Not on file    Number of children: Not on file    Years of education: Not on file    Highest education level: Not on file   Occupational History    Not on file   Tobacco Use    Smoking status: Former     Types: Cigarettes     Quit date: 2019     Years since quittin.6    Smokeless tobacco: Never   Vaping Use    Vaping Use: Never used   Substance and Sexual Activity    Alcohol use: Not on file    Drug use: Not on file    Sexual activity: Not on file   Other Topics Concern    Parent/sibling w/ CABG, MI or angioplasty before 65F 55M? Not Asked   Social History Narrative    Not on file     Social Determinants of Health     Financial Resource Strain: Not on file   Food Insecurity: Not on file   Transportation Needs: Not on file   Physical Activity: Not on file   Stress: Not on file   Social Connections: Not on file   Intimate Partner Violence: Not on file   Housing Stability: Not on file           Medications  Allergies   Current Outpatient Medications   Medication Sig Dispense Refill    acetaminophen (TYLENOL) 500 MG tablet Take 500-1,000 mg by mouth every 8 hours as needed       albuterol (PROAIR HFA/PROVENTIL HFA/VENTOLIN HFA) 108 (90 Base) MCG/ACT inhaler Inhale 2 puffs into the lungs      aspirin (ASA) 81 MG EC tablet Take 81 mg by mouth daily      atorvastatin (LIPITOR) 40 MG tablet Take 40 mg by mouth At Bedtime      Cholecalciferol 250 MCG (90203 UT) TABS Take 10,000 Units by mouth daily      famotidine (PEPCID) 20 MG tablet Take 1 tablet (20 mg) by mouth 2 times daily 180 tablet 3    fluticasone (FLONASE) 50 MCG/ACT nasal spray Spray 2 sprays in nostril as needed      fluticasone-salmeterol (ADVAIR) 500-50 MCG/ACT inhaler Inhale 1 puff into the lungs every 12 hours 180 each 3    Fluticasone-Umeclidin-Vilanterol (TRELEGY ELLIPTA) 200-62.5-25 MCG/ACT oral inhaler Inhale 1 puff into the lungs daily      HEMP OIL OR EXTRACT OR OTHER CBD CANNABINOID, NOT MEDICAL CANNABIS,  1 gummy at HS for sleep-delta 9      IBANdronate (BONIVA) 150 MG tablet Take 150 mg by mouth every 30 days      lisinopril (ZESTRIL) 5 MG tablet Take 2.5 mg by mouth daily      melatonin 3 MG CAPS Take 1 capsule by mouth At Bedtime      Multiple Vitamin (MULTIVITAMIN ADULT PO) Take 1 tablet by mouth daily      nicotine polacrilex (NICORETTE) 4 MG gum Take 4 mg by mouth every hour as needed      nitroGLYcerin (NITRODUR) 0.2 MG/HR 24 hr patch Place 1 patch onto the skin daily 90 patch 3    nitroGLYcerin (NITROSTAT) 0.4 MG sublingual tablet For chest pain place 1 tablet under the tongue every 5 minutes for 3 doses. If symptoms persist 5 minutes after 1st dose call 911. (Patient not taking: Reported on 4/10/2023) 25 tablet 4    tiotropium (SPIRIVA) 18 MCG inhaled capsule Inhale 1 capsule (18 mcg) into the lungs daily 90 capsule 3     No Known Allergies       Lab Results    Chemistry/lipid CBC Cardiac Enzymes/BNP/TSH/INR   Recent Labs   Lab Test 02/17/23  1255   CHOL 184   HDL 95   LDL 80   TRIG 46     Recent Labs   Lab Test 02/17/23  1255 10/20/22  1342 02/04/22  1327   LDL 80 75 75     Recent Labs   Lab Test 10/20/22  1342      POTASSIUM 4.5   CHLORIDE 101   CO2 22   GLC 85   BUN 13.9   CR 0.78   GFRESTIMATED 77   TRACE 10.2     Recent Labs   Lab Test 10/20/22  1342 06/13/22  1324 06/10/22  1311   CR 0.78 0.76 0.7     No results for input(s): A1C in the last 32867 hours.       Recent Labs   Lab Test 07/18/22  1146 06/13/22  1324   WBC  --  8.5   HGB 12.1 13.4   HCT  --  42.0   MCV  --  103*   PLT  --  273     Recent Labs   Lab Test 07/18/22  1146 06/13/22  1324 06/07/21  1309   HGB 12.1 13.4 13.3    Recent Labs   Lab Test 08/09/22  1430 01/30/21  0602 01/30/21  0023   TROPONINI <0.01 <0.01 <0.01     No results for input(s): BNP, NTBNPI, NTBNP in the last 35124 hours.  Recent Labs   Lab Test 10/07/19  1602   TSH 1.55     Recent Labs   Lab Test 01/29/21  1307   INR 1.00        Shivam Denise,  MD

## 2023-08-30 NOTE — LETTER
8/30/2023    LUCRETIA ROSARIO  [address]  [facility] [address]    RE: Shantel Romero       Dear Colleague,     I had the pleasure of seeing Shantel Romero in the [facility].    HEART CARE ENCOUNTER CONSULTATON NOTE      [facility]  [phone]      Assessment/Recommendations   Assessment/Plan:  1. Coronary artery disease.  She has done well on low-dose nitroglycerin patch.  She had a CT angiogram that demonstrated significant elevated coronary calcium score 507 in 2021 but no obstructive coronary artery disease noted at that time.  She has had ongoing improvement in epigastric discomfort with Pepcid.  Nuclear stress test was completed August 2022 which was interpreted as negative for ischemia normal ejection fraction.    2.  Hypertension.  She had previously described some woozy sensation at which time lisinopril dose was decreased to 2.5 mg daily.  Blood pressure today is reasonable at 126/82.  He does have an appointment with primary care in October 2023.  It is noted however that when she saw oncology her blood pressure was higher.  Does monitor her blood pressure and will continue to monitor her blood pressure.  She states that at night when she checks her blood pressure her pulse sometimes is in the  range.  He does indicate that she gets anxious sometimes.  We discussed whether we would repeat a monitor but at this point she prefers not to pursue.    3.  Patient previously mentioned awareness of a panic sensation when she goes to bed at night.  She was not certain whether her heart was racing.  She had a Holter monitor March 2021 that demonstrated mild increase in atrial ectopy.  We discussed whether we would repeat the monitor but at this point her preference is to hold on additional monitoring.  We will keep me updated with any worsening symptoms or change in vital signs.    4.  Dyslipidemia.  Cholesterol numbers from 2023 showed a cholesterol  184, glycerides of 46, LDL 80.  We discussed that with her elevated coronary calcium score ideally would like to see her LDL less than 70.  We talked about up titration of atorvastatin or adding Zetia.  She would like to add Zetia 10 mg daily and would like to have her blood work in October when she sees her primary care physician.  I will place this in our chart record and wrote a note to her to remind her to discuss this with her primary care.    5.  Lung cancer.  Chart records reviewed.  Upcoming PET scan and radiation therapy.  Underlying significant emphysema on prior CT scan.  6.  Diminished pulses in the lower extremities.  No symptoms of claudication or nonhealing ulcers.  Currently she is overwhelmed by the diagnosis of progression of lung cancer.  Consideration could be given to ankle-brachial indices can discuss this on follow-up or discussed with her primary care physician.  I am very pleased to hear that she has discontinued tobacco.  She has had screening for abdominal aortic aneurysm with a CT of the chest abdomen in 2022 reporting atherosclerotic plaque but no aneurysm.    1.  Add Zetia 10 mg daily.  2.  Patient has an appointment with primary care in October and we will request a fasting lipid panel AST and ALT copy sent to me.  3.  Follow-up in 6 months       History of Present Illness/Subjective    HPI: Shantel Romero is a 78 year old female who is seen in follow-up.  We last visited January 2023.  At that time she had been feeling well and the epigastric discomfort remained persistently improved with famotidine.  I have reviewed recent notes from oncology and radiation oncology.  He was diagnosed with a right-sided lung cancer had a recent PET scan performed based on the chart reviewed.  There was a small nodule of the right lower lobe which enlarged over the past 6 months reportedly measuring 7 x 9 mm.    She reports that the epigastric discomfort continues to be significantly improved.  He  is not describing any clear-cut symptoms of chest discomfort.  She does have mild breathlessness with exertion.  She reports that her heart rates trend in the 90s to low 100s at night when she checks her blood pressure and pulse no significant dizziness or palpitations.  At this point she prefers not to repeat a monitor.  She did wear a Holter monitor in March 2021.  This demonstrated some atrial ectopy but no complex ectopy noted.  Mild sinus tachycardia noted.    Recent Echocardiogram Results:  tains abnormal data Echocardiogram Complete  Order: [id number]  Status: Final result       Visible to patient: Yes (not seen)       Next appt: 08/30/2023 at 01:20 PM in Cardiology (Shivam Denies MD)    0 Result Notes  Details    Reading Physician Reading Date Result Priority   Shivam Denise MD  [phone] 1/30/2021 Routine   Provider, Historical 1/30/2021      Narrative & Impression    No previous study for comparison.    Normal left ventricular size. Mild left ventricular hypertrophy    Left ventricle ejection fraction is normal. The calculated left ventricular ejection fraction is 67%.    Possible mild right ventricular enlargement.    Normal right ventricular systolic function.    Mild right atrial enlargement.    Aortic valve sclerosis without evidence for significant aortic stenosis. Mild aortic insufficiency.    Trace pericardial effusion suggested          Recent Coronary Angiogram Results:    Exam Date Exam Time Exam Date Exam Time Accession # Performing Department Results    2/1/21 12:00 AM 2/1/21 12:01 PM [id number] [facility]        [id number]       PACS Images     Show images for CTA Angiogram coronary artery     Study Result    Narrative & Impression     The total Agatston calcium score is 507. A calcium score in this range places the individual in the 100th percentile when compared to an age and gender matched control group and implies a very high risk of cardiac  events in the next ten years.    Right dominant coronary artery system.    Mild nonobstructive coronary artery disease in the left main, left anterior descending artery and circumflex artery.          NM Lexiscan stress test  Order: [id number]  Status: Final result       Visible to patient: Yes (seen)       Next appt: 08/30/2023 at 01:20 PM in Cardiology (Shivam Denise MD)       Dx: Coronary artery disease due to lipid ...    2 Result Notes       1 Patient Communication  Details    Reading Physician Reading Date Result Priority   Jadon Harrington, DO  [phone] 8/18/2022 Routine   Jadon Harrington,   [phone] 8/18/2022      Result Text       The nuclear stress test is negative for inducible myocardial ischemia or infarction. The left ventricular ejection fraction at rest is 75%. Left ventricular function is normal.    A prior study was conducted on 5/23/2017.            Review of Systems   126/82, weight 149 pounds, rate of 74 and regular  HEENT exam unremarkable.  Lungs: This breath sounds, slight expiratory wheeze,  Cor, heart tones are distant, regular rhythm is noted  Abdomen, soft and nontender  Extremities, trace edema at the ankles.  Pulses are diminished in the dorsalis pedis and posterior tibial distribution.  Neuro: Focal deficits.    Please refer above for cardiac ROS details.        Medical History  Surgical History Family History Social History   Past Medical History:   Diagnosis Date    COPD (chronic obstructive pulmonary disease) (H)     Coronary artery disease due to lipid rich plaque 02/2021    Abnormal coronary CTA    Family history of myocardial infarction     mother under age 60's  CVA and MI    High cholesterol     Hypertension     PVC's (premature ventricular contractions)     Syncope 2012    Tubular adenoma of colon      Past Surgical History:   Procedure Laterality Date    APPENDECTOMY      SHOULDER SURGERY Right     TUBAL LIGATION       Family History   Problem  Relation Age of Onset    Lung Cancer Father         lung    Breast Cancer Paternal Aunt     Cerebrovascular Disease Mother     Coronary Artery Disease Mother         Social History     Socioeconomic History    Marital status:      Spouse name: Not on file    Number of children: Not on file    Years of education: Not on file    Highest education level: Not on file   Occupational History    Not on file   Tobacco Use    Smoking status: Former     Types: Cigarettes     Quit date: 2019     Years since quittin.6    Smokeless tobacco: Never   Vaping Use    Vaping Use: Never used   Substance and Sexual Activity    Alcohol use: Not on file    Drug use: Not on file    Sexual activity: Not on file   Other Topics Concern    Parent/sibling w/ CABG, MI or angioplasty before 65F 55M? Not Asked   Social History Narrative    Not on file     Social Determinants of Health     Financial Resource Strain: Not on file   Food Insecurity: Not on file   Transportation Needs: Not on file   Physical Activity: Not on file   Stress: Not on file   Social Connections: Not on file   Intimate Partner Violence: Not on file   Housing Stability: Not on file           Medications  Allergies   Current Outpatient Medications   Medication Sig Dispense Refill    acetaminophen (TYLENOL) 500 MG tablet Take 500-1,000 mg by mouth every 8 hours as needed       albuterol (PROAIR HFA/PROVENTIL HFA/VENTOLIN HFA) 108 (90 Base) MCG/ACT inhaler Inhale 2 puffs into the lungs      aspirin (ASA) 81 MG EC tablet Take 81 mg by mouth daily      atorvastatin (LIPITOR) 40 MG tablet Take 40 mg by mouth At Bedtime      Cholecalciferol 250 MCG (95332 UT) TABS Take 10,000 Units by mouth daily      famotidine (PEPCID) 20 MG tablet Take 1 tablet (20 mg) by mouth 2 times daily 180 tablet 3    fluticasone (FLONASE) 50 MCG/ACT nasal spray Spray 2 sprays in nostril as needed      fluticasone-salmeterol (ADVAIR) 500-50 MCG/ACT inhaler Inhale 1 puff into the lungs every 12  hours 180 each 3    Fluticasone-Umeclidin-Vilanterol (TRELEGY ELLIPTA) 200-62.5-25 MCG/ACT oral inhaler Inhale 1 puff into the lungs daily      HEMP OIL OR EXTRACT OR OTHER CBD CANNABINOID, NOT MEDICAL CANNABIS, 1 gummy at HS for sleep-delta 9      IBANdronate (BONIVA) 150 MG tablet Take 150 mg by mouth every 30 days      lisinopril (ZESTRIL) 5 MG tablet Take 2.5 mg by mouth daily      melatonin 3 MG CAPS Take 1 capsule by mouth At Bedtime      Multiple Vitamin (MULTIVITAMIN ADULT PO) Take 1 tablet by mouth daily      nicotine polacrilex (NICORETTE) 4 MG gum Take 4 mg by mouth every hour as needed      nitroGLYcerin (NITRODUR) 0.2 MG/HR 24 hr patch Place 1 patch onto the skin daily 90 patch 3    nitroGLYcerin (NITROSTAT) 0.4 MG sublingual tablet For chest pain place 1 tablet under the tongue every 5 minutes for 3 doses. If symptoms persist 5 minutes after 1st dose call 911. (Patient not taking: Reported on 4/10/2023) 25 tablet 4    tiotropium (SPIRIVA) 18 MCG inhaled capsule Inhale 1 capsule (18 mcg) into the lungs daily 90 capsule 3     No Known Allergies       Lab Results    Chemistry/lipid CBC Cardiac Enzymes/BNP/TSH/INR   Recent Labs   Lab Test 02/17/23  1255   CHOL 184   HDL 95   LDL 80   TRIG 46     Recent Labs   Lab Test 02/17/23  1255 10/20/22  1342 02/04/22  1327   LDL 80 75 75     Recent Labs   Lab Test 10/20/22  1342      POTASSIUM 4.5   CHLORIDE 101   CO2 22   GLC 85   BUN 13.9   CR 0.78   GFRESTIMATED 77   TRACE 10.2     Recent Labs   Lab Test 10/20/22  1342 06/13/22  1324 06/10/22  1311   CR 0.78 0.76 0.7     No results for input(s): A1C in the last 35561 hours.       Recent Labs   Lab Test 07/18/22  1146 06/13/22  1324   WBC  --  8.5   HGB 12.1 13.4   HCT  --  42.0   MCV  --  103*   PLT  --  273     Recent Labs   Lab Test 07/18/22  1146 06/13/22  1324 06/07/21  1309   HGB 12.1 13.4 13.3    Recent Labs   Lab Test 08/09/22  1430 01/30/21  0602 01/30/21  0023   TROPONINI <0.01 <0.01 <0.01     No  results for input(s): BNP, NTBNPI, NTBNP in the last 54927 hours.  Recent Labs   Lab Test 10/07/19  1602   TSH 1.55     Recent Labs   Lab Test 01/29/21  1307   INR 1.00        Shivam Denise MD      Thank you for allowing me to participate in the care of your patient.      Sincerely,     Shivam Denise MD     [facility]  cc:   Shivam Denise MD  [address] [facility]  [address]  [place] [address]

## 2023-08-31 ENCOUNTER — TELEPHONE (OUTPATIENT)
Dept: CARDIOLOGY | Facility: CLINIC | Age: 78
End: 2023-08-31
Payer: MEDICARE

## 2023-08-31 NOTE — TELEPHONE ENCOUNTER
Pt called - hasn't heard from pharmacy yet regarding new prescription Dr Denise sent in.    Advised pt to check with pharm.  Med was sent 8/30/23 @ 13:37.  Pt verbalized understanding and had no additional questions.  rika

## 2023-09-08 ENCOUNTER — HOSPITAL ENCOUNTER (OUTPATIENT)
Dept: PET IMAGING | Facility: HOSPITAL | Age: 78
Discharge: HOME | End: 2023-09-08
Attending: STUDENT IN AN ORGANIZED HEALTH CARE EDUCATION/TRAINING PROGRAM | Admitting: STUDENT IN AN ORGANIZED HEALTH CARE EDUCATION/TRAINING PROGRAM
Payer: MEDICARE

## 2023-09-08 DIAGNOSIS — C34.31 MALIGNANT NEOPLASM OF LOWER LOBE OF RIGHT LUNG (H): ICD-10-CM

## 2023-09-08 DIAGNOSIS — C34.11 MALIGNANT NEOPLASM OF UPPER LOBE OF RIGHT LUNG (H): ICD-10-CM

## 2023-09-08 LAB — GLUCOSE BLDC GLUCOMTR-MCNC: 85 MG/DL (ref 70–99)

## 2023-09-08 PROCEDURE — G1010 CDSM STANSON: HCPCS | Mod: PS

## 2023-09-08 PROCEDURE — A9552 F18 FDG: HCPCS | Performed by: STUDENT IN AN ORGANIZED HEALTH CARE EDUCATION/TRAINING PROGRAM

## 2023-09-08 PROCEDURE — 82962 GLUCOSE BLOOD TEST: CPT

## 2023-09-08 PROCEDURE — 343N000001 HC RX 343: Performed by: STUDENT IN AN ORGANIZED HEALTH CARE EDUCATION/TRAINING PROGRAM

## 2023-09-08 RX ADMIN — FLUDEOXYGLUCOSE F-18 10.76 MILLICURIE: 500 INJECTION, SOLUTION INTRAVENOUS at 12:39

## 2023-09-20 ENCOUNTER — APPOINTMENT (OUTPATIENT)
Dept: RADIATION ONCOLOGY | Facility: CLINIC | Age: 78
End: 2023-09-20
Attending: STUDENT IN AN ORGANIZED HEALTH CARE EDUCATION/TRAINING PROGRAM
Payer: MEDICARE

## 2023-09-20 PROCEDURE — 77263 THER RADIOLOGY TX PLNG CPLX: CPT | Performed by: STUDENT IN AN ORGANIZED HEALTH CARE EDUCATION/TRAINING PROGRAM

## 2023-09-20 PROCEDURE — 77470 SPECIAL RADIATION TREATMENT: CPT | Mod: 26 | Performed by: STUDENT IN AN ORGANIZED HEALTH CARE EDUCATION/TRAINING PROGRAM

## 2023-09-27 ENCOUNTER — ALLIED HEALTH/NURSE VISIT (OUTPATIENT)
Dept: RADIATION ONCOLOGY | Facility: HOSPITAL | Age: 78
End: 2023-09-27
Attending: STUDENT IN AN ORGANIZED HEALTH CARE EDUCATION/TRAINING PROGRAM
Payer: MEDICARE

## 2023-09-27 PROCEDURE — 77334 RADIATION TREATMENT AID(S): CPT | Performed by: STUDENT IN AN ORGANIZED HEALTH CARE EDUCATION/TRAINING PROGRAM

## 2023-09-27 PROCEDURE — 77334 RADIATION TREATMENT AID(S): CPT | Mod: 26 | Performed by: STUDENT IN AN ORGANIZED HEALTH CARE EDUCATION/TRAINING PROGRAM

## 2023-09-27 NOTE — PROGRESS NOTES
Number of sites to be treated: 1  Area(s) simulated: (check all appropriate sites)      Thorax: Lung  Normal tissues to be spared with custom blocks: (check all that apply)      Neuro: Spinal Cord      Head & Neck: Aerodigestive Mucosa, Larynx      Thorax: Lung Parenchyma, Heart, Esophagus  For planning:      CT Scan  Custom devices to be used: Vac-Dakota  Patient position:      Supine  Field arrangment:       SBRT  Planned dose:       5400 cGy  3 fractions    Comments:  Diagnosis: History of Stage IA2 presumed non-small cell lung cancer of the right upper lobe (no pathologic confirmation, patient declining biopsy)  status post SBRT  7/5/2021-7/9/2021 SBRT  Right upper lobe 5400 cGy in 3 fractions     Progressive spiculated nodular density in the right lower lobe, now measuring up to 9 mm.   PET/CT Findings suspicious for a synchronous early right lower lobe primary lung adenocarcinoma without metastasis (no pathologic confirmation, patient declining biopsy).     The patient was simulated in the supine position with the arms up in frame for immobilization.  A  planning 4DCT scan of the thorax was performed without IV contrast. The procedure was well tolerated.   The ITV was designated as the CTV and 5 mm was added for PTV.    A conformal stereotactic radiotherapy plan was designed including contour,  delineation of the radiotherapy volumes and critical normal tissues,  determination of shielding, dose distribution, digitally reconstructed  radiographs of the treatment fields and dose-volume histograms for PTV and  normal tissues.      will involve daily IGRT with cone-beam CT, MV portal imaging and weekly review in clinic.

## 2023-10-02 PROCEDURE — 77300 RADIATION THERAPY DOSE PLAN: CPT | Mod: 26 | Performed by: STUDENT IN AN ORGANIZED HEALTH CARE EDUCATION/TRAINING PROGRAM

## 2023-10-02 PROCEDURE — 77293 RESPIRATOR MOTION MGMT SIMUL: CPT | Mod: 26 | Performed by: STUDENT IN AN ORGANIZED HEALTH CARE EDUCATION/TRAINING PROGRAM

## 2023-10-02 PROCEDURE — 77301 RADIOTHERAPY DOSE PLAN IMRT: CPT | Mod: 26 | Performed by: STUDENT IN AN ORGANIZED HEALTH CARE EDUCATION/TRAINING PROGRAM

## 2023-10-02 PROCEDURE — 77338 DESIGN MLC DEVICE FOR IMRT: CPT | Mod: 26 | Performed by: STUDENT IN AN ORGANIZED HEALTH CARE EDUCATION/TRAINING PROGRAM

## 2023-10-03 ENCOUNTER — APPOINTMENT (OUTPATIENT)
Dept: RADIATION ONCOLOGY | Facility: CLINIC | Age: 78
End: 2023-10-03
Attending: STUDENT IN AN ORGANIZED HEALTH CARE EDUCATION/TRAINING PROGRAM
Payer: MEDICARE

## 2023-10-03 PROCEDURE — 77338 DESIGN MLC DEVICE FOR IMRT: CPT | Performed by: STUDENT IN AN ORGANIZED HEALTH CARE EDUCATION/TRAINING PROGRAM

## 2023-10-03 PROCEDURE — 77293 RESPIRATOR MOTION MGMT SIMUL: CPT | Performed by: STUDENT IN AN ORGANIZED HEALTH CARE EDUCATION/TRAINING PROGRAM

## 2023-10-03 PROCEDURE — 77300 RADIATION THERAPY DOSE PLAN: CPT | Performed by: STUDENT IN AN ORGANIZED HEALTH CARE EDUCATION/TRAINING PROGRAM

## 2023-10-03 PROCEDURE — 77301 RADIOTHERAPY DOSE PLAN IMRT: CPT | Performed by: STUDENT IN AN ORGANIZED HEALTH CARE EDUCATION/TRAINING PROGRAM

## 2023-10-03 PROCEDURE — 77370 RADIATION PHYSICS CONSULT: CPT | Mod: XU | Performed by: STUDENT IN AN ORGANIZED HEALTH CARE EDUCATION/TRAINING PROGRAM

## 2023-10-06 ENCOUNTER — APPOINTMENT (OUTPATIENT)
Dept: RADIATION ONCOLOGY | Facility: CLINIC | Age: 78
End: 2023-10-06
Attending: STUDENT IN AN ORGANIZED HEALTH CARE EDUCATION/TRAINING PROGRAM
Payer: MEDICARE

## 2023-10-06 VITALS
BODY MASS INDEX: 24.21 KG/M2 | HEART RATE: 78 BPM | TEMPERATURE: 97.7 F | RESPIRATION RATE: 16 BRPM | SYSTOLIC BLOOD PRESSURE: 131 MMHG | DIASTOLIC BLOOD PRESSURE: 69 MMHG | WEIGHT: 150 LBS | OXYGEN SATURATION: 96 %

## 2023-10-06 DIAGNOSIS — C34.31 MALIGNANT NEOPLASM OF LOWER LOBE OF RIGHT LUNG (H): Primary | ICD-10-CM

## 2023-10-06 PROCEDURE — 77370 RADIATION PHYSICS CONSULT: CPT | Performed by: STUDENT IN AN ORGANIZED HEALTH CARE EDUCATION/TRAINING PROGRAM

## 2023-10-06 PROCEDURE — 77373 STRTCTC BDY RAD THER TX DLVR: CPT | Performed by: STUDENT IN AN ORGANIZED HEALTH CARE EDUCATION/TRAINING PROGRAM

## 2023-10-06 ASSESSMENT — PAIN SCALES - GENERAL: PAINLEVEL: NO PAIN (0)

## 2023-10-06 NOTE — PROGRESS NOTES
RADIATION ONCOLOGY WEEKLY TREATMENT VISIT NOTE      Assessment / Impression     Malignant neoplasm of lower lobe of right lung (H) [C34.31]     Cancer Staging   No matching staging information was found for the patient.     Tolerating radiation therapy well.  All questions and concerns addressed.  First treatment today went well no acute side effects    Plan:     Continue radiation treatment as prescribed.  Radiation:   Site: RML  Stereotactic Radiosurgery: Yes  Stereotactic Radiosurgery date: 10/06/23  Today's Dose: 1800  Total Dose for Thoracic: 5400  Today's Fraction/Total Fraction Thoracic: 1/3      Subjective:      HPI: Shantel Romero is a 78 year old female with  Malignant neoplasm of lower lobe of right lung (H) [C34.31]    First treatment today went well.  No questions or concerns.    The following portions of the patient's history were reviewed and updated as appropriate: allergies, current medications, past family history, past medical history, past social history, past surgical history and problem list.    Assessment                  Body Site:  Thoracic Site: RML  Stereotactic Radiosurgery: Yes  Stereotactic Radiosurgery date: 10/06/23  Today's Dose: 1800  Total Dose for Thoracic: 5400  Today's Fraction/Total Fraction Thoracic: 1/3  Voice Chances/Stridor/Larynx: 0: Normal  Pharynx and Esphogaus: 0: No change over baseline  Constipation: 0: None  Diarrhea W/O Colostomy: 0: None  Hemoptysis: 0: None  Mucus Quantity: 0: None  Dyspnea: 0: Normal                                   Sexuality Alteration                    Emotional Alteration       Comfort Alteration       Nutrition Alteration      Skin Alteration      AUA Assessment                                           Accompanied by       Objective:     Exam:     There were no vitals filed for this visit.    Wt Readings from Last 8 Encounters:   08/30/23 67.7 kg (149 lb 3.2 oz)   08/25/23 68.5 kg (151 lb 1.6 oz)   07/24/23 69.4 kg (153 lb)    04/10/23 68 kg (150 lb)   02/20/23 69.9 kg (154 lb)   01/30/23 68.9 kg (152 lb)   10/24/22 70 kg (154 lb 6.4 oz)   10/10/22 69.3 kg (152 lb 11.2 oz)       General: Alert and oriented, in no acute distress  Shantel has no Erythema.    Treatment Summary to Date    Aria chart and setup information reviewed    Jessica Fong MD

## 2023-10-06 NOTE — LETTER
10/6/2023         RE: Shantel Romero  [address]  [place] [address]        Dear Colleague,    Thank you for referring your patient, Shantel Romero, to the [facility] [place]. Please see a copy of my visit note below.    RADIATION ONCOLOGY WEEKLY TREATMENT VISIT NOTE      Assessment / Impression     Malignant neoplasm of lower lobe of right lung (H) [C34.31]     Cancer Staging   No matching staging information was found for the patient.     Tolerating radiation therapy well.  All questions and concerns addressed.  First treatment today went well no acute side effects    Plan:     Continue radiation treatment as prescribed.  Radiation:   Site: RML  Stereotactic Radiosurgery: Yes  Stereotactic Radiosurgery date: 10/06/23  Today's Dose: 1800  Total Dose for Thoracic: 5400  Today's Fraction/Total Fraction Thoracic: 1/3      Subjective:      HPI: Shantel Romero is a 78 year old female with  Malignant neoplasm of lower lobe of right lung (H) [C34.31]    First treatment today went well.  No questions or concerns.    The following portions of the patient's history were reviewed and updated as appropriate: allergies, current medications, past family history, past medical history, past social history, past surgical history and problem list.    Assessment                  Body Site:  Thoracic Site: RML  Stereotactic Radiosurgery: Yes  Stereotactic Radiosurgery date: 10/06/23  Today's Dose: 1800  Total Dose for Thoracic: 5400  Today's Fraction/Total Fraction Thoracic: 1/3  Voice Chances/Stridor/Larynx: 0: Normal  Pharynx and Esphogaus: 0: No change over baseline  Constipation: 0: None  Diarrhea W/O Colostomy: 0: None  Hemoptysis: 0: None  Mucus Quantity: 0: None  Dyspnea: 0: Normal                                   Sexuality Alteration                    Emotional Alteration       Comfort Alteration       Nutrition Alteration      Skin Alteration      AUA  Assessment                                           Accompanied by       Objective:     Exam:     There were no vitals filed for this visit.    Wt Readings from Last 8 Encounters:   08/30/23 67.7 kg (149 lb 3.2 oz)   08/25/23 68.5 kg (151 lb 1.6 oz)   07/24/23 69.4 kg (153 lb)   04/10/23 68 kg (150 lb)   02/20/23 69.9 kg (154 lb)   01/30/23 68.9 kg (152 lb)   10/24/22 70 kg (154 lb 6.4 oz)   10/10/22 69.3 kg (152 lb 11.2 oz)       General: Alert and oriented, in no acute distress  Shantel has no Erythema.    Treatment Summary to Date    Aria chart and setup information reviewed    Jessica Fong MD      Again, thank you for allowing me to participate in the care of your patient.        Sincerely,        Jessica Fong MD

## 2023-10-09 ENCOUNTER — APPOINTMENT (OUTPATIENT)
Dept: RADIATION ONCOLOGY | Facility: CLINIC | Age: 78
End: 2023-10-09
Attending: INTERNAL MEDICINE
Payer: MEDICARE

## 2023-10-09 PROCEDURE — 77373 STRTCTC BDY RAD THER TX DLVR: CPT | Performed by: RADIOLOGY

## 2023-10-11 ENCOUNTER — APPOINTMENT (OUTPATIENT)
Dept: RADIATION ONCOLOGY | Facility: CLINIC | Age: 78
End: 2023-10-11
Attending: INTERNAL MEDICINE
Payer: MEDICARE

## 2023-10-11 DIAGNOSIS — C34.31 MALIGNANT NEOPLASM OF LOWER LOBE OF RIGHT LUNG (H): Primary | ICD-10-CM

## 2023-10-11 DIAGNOSIS — C34.11 MALIGNANT NEOPLASM OF UPPER LOBE OF RIGHT LUNG (H): ICD-10-CM

## 2023-10-11 PROCEDURE — 77435 SBRT MANAGEMENT: CPT | Performed by: STUDENT IN AN ORGANIZED HEALTH CARE EDUCATION/TRAINING PROGRAM

## 2023-10-11 PROCEDURE — 77336 RADIATION PHYSICS CONSULT: CPT | Performed by: STUDENT IN AN ORGANIZED HEALTH CARE EDUCATION/TRAINING PROGRAM

## 2023-10-11 PROCEDURE — 77373 STRTCTC BDY RAD THER TX DLVR: CPT | Performed by: RADIOLOGY

## 2023-10-11 NOTE — PROGRESS NOTES
Patient here ambulatory for final radiation therapy treatment for her lung cancer.  Patient denies any side effects at this time.  Written discharge instructions reviewed and given to patient.  Follow-up with Dr. Fong in about 2 months after a scan.  Patient verbalized understanding and and instructed to make appointments on her way out of the clinic today.

## 2023-10-13 ENCOUNTER — OFFICE VISIT (OUTPATIENT)
Dept: PULMONOLOGY | Facility: CLINIC | Age: 78
End: 2023-10-13
Attending: NURSE PRACTITIONER
Payer: MEDICARE

## 2023-10-13 VITALS
OXYGEN SATURATION: 97 % | SYSTOLIC BLOOD PRESSURE: 144 MMHG | WEIGHT: 150 LBS | HEART RATE: 89 BPM | DIASTOLIC BLOOD PRESSURE: 85 MMHG | BODY MASS INDEX: 24.21 KG/M2

## 2023-10-13 DIAGNOSIS — R06.09 DYSPNEA ON EXERTION: ICD-10-CM

## 2023-10-13 DIAGNOSIS — C34.11 MALIGNANT NEOPLASM OF UPPER LOBE OF RIGHT LUNG (H): ICD-10-CM

## 2023-10-13 DIAGNOSIS — J44.9 CHRONIC OBSTRUCTIVE PULMONARY DISEASE, UNSPECIFIED COPD TYPE (H): Primary | ICD-10-CM

## 2023-10-13 PROCEDURE — 99214 OFFICE O/P EST MOD 30 MIN: CPT | Performed by: NURSE PRACTITIONER

## 2023-10-13 NOTE — PATIENT INSTRUCTIONS
- we have you on 3 inhaled medications. A ICS, LABA, and LAMA. All three of these are in Trelegy (brown cap) OR Breo (ICS, LABA) and Spiriva (LAMA). Either of these regimens will work.   - I recommend the RSV vaccine when you can get it. Check with your insurance this may only be covered in a pharmacy.     If you have worsening symptoms, questions, or need to speak with the nurse please call [phone].

## 2023-10-13 NOTE — PROGRESS NOTES
Pulmonary Medicine Consult   October 13, 2023    Assessment and Plan:   Shantel Romero is a 78 year old female former smoker with history of COPD, emphysema, CAD, HLD, HTN, and lung cancer diagnosed in 2019 and again in 2023 s/p radiation presenting today for follow up.    #1. Dyspnea on exertion -- minimal   #2. COPD GOLD B -- no flares since last visit. PFTs show moderate-severe obstructive disease with a moderately reduced diffusion capacity.  #3. Emphysema  #4. Right upper lobe lung mass -- completed SBRT in July 2021 for right upper lobe cancer. She sees Dr. Barnhart in oncology and radiation oncology. Has started radiation for the slowly evolving RLL nodule in 10/2023. Per rad onc note she is not interested in any further invasive procedures.     START Trelegy 1 puff daily.  When she finishes her 3-month supply she will let me know if she wants to stick with this or change back to Breo and Wixela.  CONTINUE Albuterol inhaler every 4 hours as needed for increased shortness of breath or chest tightness. You can use the spacer with this to help you get the medication into your lungs  Try and increase exercise as you are able   Not interested in pulmonary rehab at this time.  UTD with COVID booster, and pneumonia vaccine. Recommend seasonal flu and RSV vaccine.     RTC 4 months    Maura Turner CNP  Pulmonary Medicine  [facility]   [phone]    ___________________________________________________________________    CC:   Chief Complaint   Patient presents with    Follow Up     6 MONTH FOLLOW UP:  Chronic obstructive pulmonary disease, unspecified COPD type (H)  Dyspnea on exertion  Panlobular emphysema (H)  Malignant neoplasm of upper lobe of right lung (H)   CT CHEST 7/21/23  PET SCAN 9/8/23  Patient has had radiation 10/6/23, 10/9/23 and 10/11/23       HPI:   She was last seen in clinic in 04/2023 where we switched her from Spiriva and Wixela to Trelegy. Due to some confusion she was only taking  Breo and was not on a LAMA. She did not feel this was as effective and asked to switch back to Spiriva and Advair. She prefers once daily dosing so we discussed trying Trelegy again today and she is on board. No exacerbations since her last visit.   Reports ongoing LUKE that she believes to be partially related to anxiety. She notes Clonazepam helps the most. She has been needing her rescue inhaler a few times daily. Is not very active.  She had previously been having issues using an HFA inhaler and when demonstrating technique in clinic was found to be using this incorrectly likely was not getting any medication.  Denies cough, chest tightness, or wheeze. Symptoms seem worse with activity and her activity tolerance is lower. Not very active, spends most of the day on the computer.   PND uses Flonase with some relief. Denies sinus pressure or congestion.   Still has intermittent chest pain. Followed by cardiology, uses nitroglycerin with relief.   Recently re-started SBRT for a new RLL nodule that was slowly growing. Followed by Dr. Barnhart and radiation oncology.     She was previously seen at [place] [facility] by Dr. Jay Gonzalez. She had PFTs done and was evaluated when she was initially diagnosed with lung cancer in 2019. Those records were not available.      Patient supplied answers from flow sheet for:  COPD Assessment Test (CAT)  2009 [employer]. All rights reserved.      8/8/2022     1:00 PM 10/10/2022    12:00 PM 4/7/2023     3:04 PM 10/13/2023    12:31 PM   COPD assessment test (CAT)   Cough 0 5 1 2   Phlegm 0 2 1 1   Chest tightness 3 5 4 3   Walk up hill 5 3 4 4   Limited activities 5 3 3 3   Leaving my home 0 0 1 0   Sleep 3 0 2 1   Energy 5 4 3 3   Total Score 21 22 19 17        ROS:   10 point ROS negative unless otherwise noted in HPI    Physical Exam:  BP (!) 144/85 (BP Location: Left arm, Patient Position: Sitting, Cuff Size: Adult Regular)   Pulse 89   Wt 68 kg (150 lb)   SpO2 97%   BMI 24.21 kg/m       Physical Exam  Constitutional:       General: She is not in acute distress.     Appearance: She is not ill-appearing or diaphoretic.   HENT:      Nose: Nose normal.   Cardiovascular:      Rate and Rhythm: Normal rate and regular rhythm.      Pulses: Normal pulses.      Heart sounds: Normal heart sounds.   Pulmonary:      Effort: Pulmonary effort is normal. No respiratory distress.      Breath sounds: Normal breath sounds. No wheezing or rhonchi.   Musculoskeletal:      Right lower leg: No edema.      Left lower leg: No edema.   Skin:     General: Skin is warm and dry.   Neurological:      Mental Status: She is alert.   Psychiatric:         Behavior: Behavior normal.       PMH:  Patient Active Problem List    Diagnosis Date Noted    Malignant neoplasm of lower lobe of right lung (H) 08/25/2023     Priority: Medium    Abnormal electrocardiogram 09/07/2022     Priority: Medium    Malignant neoplasm of upper lobe of right lung (H) 06/16/2021     Priority: Medium    Mass of upper lobe of right lung 02/08/2021     Priority: Medium    Tobacco abuse 02/01/2021     Priority: Medium    Hypertension, unspecified type      Priority: Medium    Dyspnea on exertion      Priority: Medium    Coronary artery disease due to lipid rich plaque      Priority: Medium    Chest pain 01/29/2021     Priority: Medium    Osteopenia      Priority: Medium     Created by Conversion  Replacement Utility updated for latest IMO load        Premature Menopause      Priority: Medium     Created by Conversion           PSH:  Past Surgical History:   Procedure Laterality Date    APPENDECTOMY      SHOULDER SURGERY Right     TUBAL LIGATION         Family HX: family history includes Breast Cancer in her paternal aunt; Cerebrovascular Disease in her mother; Coronary Artery Disease in her mother; Lung Cancer in her father.    Social Hx:  Social History     Socioeconomic History    Marital status:      Spouse name: Not on file    Number of  children: Not on file    Years of education: Not on file    Highest education level: Not on file   Occupational History    Not on file   Tobacco Use    Smoking status: Former     Types: Cigarettes     Quit date: 2019     Years since quittin.7    Smokeless tobacco: Never   Vaping Use    Vaping Use: Never used   Substance and Sexual Activity    Alcohol use: Not on file    Drug use: Not on file    Sexual activity: Not on file   Other Topics Concern    Parent/sibling w/ CABG, MI or angioplasty before 65F 55M? Not Asked   Social History Narrative    Not on file     Social Determinants of Health     Financial Resource Strain: Not on file   Food Insecurity: Not on file   Transportation Needs: Not on file   Physical Activity: Not on file   Stress: Not on file   Social Connections: Not on file   Interpersonal Safety: Not on file   Housing Stability: Not on file         Current Meds:  Current Outpatient Medications   Medication Sig Dispense Refill    acetaminophen (TYLENOL) 500 MG tablet Take 500-1,000 mg by mouth every 8 hours as needed       aspirin (ASA) 81 MG EC tablet Take 81 mg by mouth daily      atorvastatin (LIPITOR) 40 MG tablet Take 40 mg by mouth At Bedtime      Cholecalciferol 250 MCG (88167 UT) TABS Take 10,000 Units by mouth daily      ezetimibe (ZETIA) 10 MG tablet Take 1 tablet (10 mg) by mouth daily 90 tablet 4    famotidine (PEPCID) 20 MG tablet Take 1 tablet (20 mg) by mouth 2 times daily 180 tablet 3    fluticasone (FLONASE) 50 MCG/ACT nasal spray Spray 2 sprays in nostril as needed      Fluticasone-Umeclidin-Vilanterol (TRELEGY ELLIPTA) 200-62.5-25 MCG/ACT oral inhaler Inhale 1 puff into the lungs daily 180 each 3    HEMP OIL OR EXTRACT OR OTHER CBD CANNABINOID, NOT MEDICAL CANNABIS, 1 gummy at HS for sleep-delta 9      IBANdronate (BONIVA) 150 MG tablet Take 150 mg by mouth every 30 days      lisinopril (ZESTRIL) 5 MG tablet Take 2.5 mg by mouth daily      melatonin 3 MG CAPS Take 1 capsule by mouth  At Bedtime      Multiple Vitamin (MULTIVITAMIN ADULT PO) Take 1 tablet by mouth daily      nicotine polacrilex (NICORETTE) 4 MG gum Take 4 mg by mouth every hour as needed      nitroGLYcerin (NITRODUR) 0.2 MG/HR 24 hr patch Place 1 patch onto the skin daily 90 patch 3    nitroGLYcerin (NITROSTAT) 0.4 MG sublingual tablet For chest pain place 1 tablet under the tongue every 5 minutes for 3 doses. If symptoms persist 5 minutes after 1st dose call 911. 25 tablet 4       Allergies:  No Known Allergies    Labs:   personally reviewed in the EMR.    Imaging studies: personally reviewed and interpreted. Below are the Radiology interpretations.      PFT's (7/8/2022): abnormal  FEV1/FVC is 60% and is reduced.  FEV1 is 1.13L (53%) predicted and is reduced  FVC is 1.88L (67%) predicted and is reduced  There was improvement in spirometry after a single inhaled dose of bronchodilator.  TLC is 4.77L (90%) predicted and is normal.  RV is 2.89L (126%) predicted and is normal.  DLCO is10.57ml/min/hg (52%) predicted and is reduced when it is corrected for hemoglobin.  Flow volume loops indicate scooping.    Impression:  Full Pulmonary Function Test is abnormal.  PFTs are consistent with moderate-severe obstructive disease.  Spirometry is consistent with reversibility.  There is no hyperinflation.  There is no air-trapping.  Diffusion capacity when corrected for hemoglobin is moderately reduced.      PET ONCOLOGY WHOLE BODY, DATE: 9/8/2023  INDICATION: Subsequent treatment planning and restaging for malignant neoplasm of upper lobe, right bronchus or lung. Status post radiation therapy completed in July 2021. Monitor treatment response.  COMPARISON: Thoracic CT dated 07/21/2023, 02/17/2023, 10/21/2022, and 02/11/2022. FDG PET/CT dated 02/04/2021  PET/CT FINDINGS: FDG avid nodule in the lateral right upper lobe measuring 1.0 x 1.0 cm (max SUV 3.2, previously 2.1) with surrounding FDG avid airspace opacity (max SUV 1.9).  Spiculated FDG  avid nodule in the posterior right lower lobe measuring 0.9 x 0.7 cm (max SUV 2.8, previously measured 0.6 x 0.5 cm on 10/21/2022) suspicious for early primary lung adenocarcinoma without metastasis.  Diffuse esophagitis. Shoulder, hip, and knee synovitis. Left upper extremity injection related artifact. Development of a groundglass opacity in the more anterior right lower lobe (max SUV 4.2) and is favored to be inflammatory in nature  CT FINDINGS: Postoperative change of the bilateral lenses. Mild carotid artery bifurcation calcification. Mild to moderate coronary artery calcium. Right renal cyst. Sigmoid diverticulosis. Multilevel degenerative changes of the mildly scoliotic spine.   The lower extremities are unremarkable.                                                                IMPRESSION:  1. FDG avid nodule in the lateral right upper lobe with surrounding FDG avid airspace opacity. While this may represent evolving posttreatment change, the paradoxical increase in metabolic activity of the right upper lobe nodule is somewhat atypical and warrants continued imaging surveillance as this could represent early disease recurrence.  2. Findings suspicious for a synchronous early right lower lobe primary lung adenocarcinoma without metastasis.    Chest CT 6/10/22:  IMPRESSION:  1.  Stable 14 x 10 mm nodule right upper lobe since 02/11/2022.  2.  New mild radiation pneumonitis around this nodule.  3.  No significant new findings.    Echo 1/30/2021:  Narrative & Impression    No previous study for comparison.    Normal left ventricular size. Mild left ventricular hypertrophy    Left ventricle ejection fraction is normal. The calculated left ventricular ejection fraction is 67%.    Possible mild right ventricular enlargement.    Normal right ventricular systolic function.    Mild right atrial enlargement.    Aortic valve sclerosis without evidence for significant aortic stenosis. Mild aortic insufficiency.    Trace  pericardial effusion suggested

## 2023-10-13 NOTE — PROGRESS NOTES
Radiation Treatment Summary          Patient: Shantel Romero            MRN: [id number]           : 1945        Care Provider: Jessica Fong MD         Date of Service: Oct 11, 2023      HISTORY: Shantel Romero was treated with VMAT radiation therapy.      Stage IA2 presumed non-small cell lung cancer of the right upper lobe (no pathologic confirmation, patient declining biopsy) .      Stage IA1  presumed non-small cell lung cancer of the right lower lobe (no pathologic confirmation, patient declining biopsy)         SITE TREATED: Right lower lobe SBRT  TOTAL DOSE: 5400 cGy   NUMBER OF FRACTIONS: 3  DATES COMPLETED: 10/6/2023 - 10/11/2023  CONCURRENT CHEMOTHERAPY: No  ADJUVANT THERAPY:No    Shantel tolerated the treatment without unexpected side effects.     PRIOR RT:  SITE TREATED: Right upper lobe  TOTAL DOSE: 5400 cGy  NUMBER OF FRACTION: 3 fractions  DATES COMPLETED: 2021-2021      PLAN: Discharge instructions were given and Shantel knows to call if questions/issues arise. Shantel will be seen in f/u in 3  months with a scan.       Signed by: Jessica Fong MD

## 2023-10-16 ENCOUNTER — TELEPHONE (OUTPATIENT)
Dept: CARDIOLOGY | Facility: CLINIC | Age: 78
End: 2023-10-16
Payer: MEDICARE

## 2023-10-16 NOTE — TELEPHONE ENCOUNTER
Pt asked how long she needs to fast for fasting lipid lab.  Advised pt to fast for 10-12 hours, but she can have water during that time.  Pt verbalized understanding and had no questions.  rika

## 2023-10-18 ENCOUNTER — TELEPHONE (OUTPATIENT)
Dept: RADIATION ONCOLOGY | Facility: CLINIC | Age: 78
End: 2023-10-18
Payer: MEDICARE

## 2023-10-18 NOTE — TELEPHONE ENCOUNTER
Called patient for routine follow-up call status post radiation for her presumed lung cancer.  Patient states she is doing well and had no side effects from the radiation.  Patient does not have a follow-up appointment scheduled yet with Dr. Fong for 3 months after a scan.  Patient would like to wait and schedule after she has her CT scan and follow-up with Dr. Barnhart scheduled.  Message sent to scheduling team to put in a recall to connect with patient and help her make appointments at a later date.  Patient has callback number should she have any questions or concerns during the interim.

## 2023-10-19 ENCOUNTER — TRANSFERRED RECORDS (OUTPATIENT)
Dept: HEALTH INFORMATION MANAGEMENT | Facility: CLINIC | Age: 78
End: 2023-10-19

## 2023-10-19 ENCOUNTER — LAB REQUISITION (OUTPATIENT)
Dept: LAB | Facility: CLINIC | Age: 78
End: 2023-10-19
Payer: MEDICARE

## 2023-10-19 DIAGNOSIS — E78.00 PURE HYPERCHOLESTEROLEMIA, UNSPECIFIED: ICD-10-CM

## 2023-10-19 LAB
ALBUMIN SERPL BCG-MCNC: 4.6 G/DL (ref 3.5–5.2)
ALP SERPL-CCNC: 50 U/L (ref 35–104)
ALT SERPL W P-5'-P-CCNC: 31 U/L (ref 0–50)
ANION GAP SERPL CALCULATED.3IONS-SCNC: 12 MMOL/L (ref 7–15)
AST SERPL W P-5'-P-CCNC: 27 U/L (ref 0–45)
BILIRUB SERPL-MCNC: 0.6 MG/DL
BUN SERPL-MCNC: 14.1 MG/DL (ref 8–23)
CALCIUM SERPL-MCNC: 10.5 MG/DL (ref 8.8–10.2)
CHLORIDE SERPL-SCNC: 103 MMOL/L (ref 98–107)
CHOLEST SERPL-MCNC: 167 MG/DL
CREAT SERPL-MCNC: 0.69 MG/DL (ref 0.51–0.95)
DEPRECATED HCO3 PLAS-SCNC: 25 MMOL/L (ref 22–29)
EGFRCR SERPLBLD CKD-EPI 2021: 88 ML/MIN/1.73M2
GLUCOSE SERPL-MCNC: 96 MG/DL (ref 70–99)
HDLC SERPL-MCNC: 98 MG/DL
LDLC SERPL CALC-MCNC: 59 MG/DL
NONHDLC SERPL-MCNC: 69 MG/DL
POTASSIUM SERPL-SCNC: 4.3 MMOL/L (ref 3.4–5.3)
PROT SERPL-MCNC: 6.9 G/DL (ref 6.4–8.3)
SODIUM SERPL-SCNC: 140 MMOL/L (ref 135–145)
TRIGL SERPL-MCNC: 51 MG/DL

## 2023-10-19 PROCEDURE — 80053 COMPREHEN METABOLIC PANEL: CPT | Mod: ORL | Performed by: FAMILY MEDICINE

## 2023-10-19 PROCEDURE — 80061 LIPID PANEL: CPT | Mod: ORL | Performed by: FAMILY MEDICINE

## 2023-12-24 ENCOUNTER — HEALTH MAINTENANCE LETTER (OUTPATIENT)
Age: 78
End: 2023-12-24

## 2024-01-19 ENCOUNTER — HOSPITAL ENCOUNTER (OUTPATIENT)
Dept: CT IMAGING | Facility: CLINIC | Age: 79
Discharge: HOME | End: 2024-01-19
Attending: INTERNAL MEDICINE | Admitting: INTERNAL MEDICINE
Payer: MEDICARE

## 2024-01-19 DIAGNOSIS — C34.11 MALIGNANT NEOPLASM OF UPPER LOBE OF RIGHT LUNG (H): ICD-10-CM

## 2024-01-19 LAB
CREAT BLD-MCNC: 0.8 MG/DL (ref 0.6–1.1)
EGFRCR SERPLBLD CKD-EPI 2021: >60 ML/MIN/1.73M2

## 2024-01-19 PROCEDURE — 82565 ASSAY OF CREATININE: CPT

## 2024-01-19 PROCEDURE — 71260 CT THORAX DX C+: CPT | Mod: MG

## 2024-01-19 PROCEDURE — 250N000011 HC RX IP 250 OP 636: Performed by: INTERNAL MEDICINE

## 2024-01-19 RX ORDER — IOPAMIDOL 755 MG/ML
90 INJECTION, SOLUTION INTRAVASCULAR ONCE
Status: COMPLETED | OUTPATIENT
Start: 2024-01-19 | End: 2024-01-19

## 2024-01-19 RX ADMIN — IOPAMIDOL 90 ML: 755 INJECTION, SOLUTION INTRAVENOUS at 12:40

## 2024-01-22 ENCOUNTER — ONCOLOGY VISIT (OUTPATIENT)
Dept: ONCOLOGY | Facility: HOSPITAL | Age: 79
End: 2024-01-22
Attending: INTERNAL MEDICINE
Payer: MEDICARE

## 2024-01-22 VITALS
HEIGHT: 66 IN | DIASTOLIC BLOOD PRESSURE: 84 MMHG | WEIGHT: 153 LBS | HEART RATE: 81 BPM | SYSTOLIC BLOOD PRESSURE: 132 MMHG | OXYGEN SATURATION: 96 % | BODY MASS INDEX: 24.59 KG/M2 | RESPIRATION RATE: 18 BRPM

## 2024-01-22 DIAGNOSIS — C34.11 MALIGNANT NEOPLASM OF UPPER LOBE OF RIGHT LUNG (H): Primary | ICD-10-CM

## 2024-01-22 PROCEDURE — G0463 HOSPITAL OUTPT CLINIC VISIT: HCPCS | Performed by: INTERNAL MEDICINE

## 2024-01-22 PROCEDURE — 99213 OFFICE O/P EST LOW 20 MIN: CPT | Performed by: INTERNAL MEDICINE

## 2024-01-22 RX ORDER — HYDROXYZINE HYDROCHLORIDE 25 MG/1
TABLET, FILM COATED ORAL
COMMUNITY
Start: 2023-10-23

## 2024-01-22 ASSESSMENT — PAIN SCALES - GENERAL: PAINLEVEL: MILD PAIN (3)

## 2024-01-22 NOTE — PROGRESS NOTES
[facility] Hematology and Oncology Progress Note    Patient: Shantel Romero  MRN: [id number]  Date of Service: Jan 22, 2024        Assessment and Plan:    1.  Right-sided lung cancer: She just had a follow-up CT scan of the chest.  I personally reviewed the images and shared them with Cosme.  The right upper lobe nodule, previously treated, is stable.  The recently treated right lower lobe nodule is also much improved and almost resolved by my eye.  No new nodules.  She is doing well clinically.  Will have her return to clinic in 6 months with repeat imaging.  She can let us know in the interim if she develops any new symptoms.      ECOG Performance  1    Diagnosis:    1.  Right upper lobe lung mass: Initially seen in 2019. Has not been biopsied.    Treatment:    Stereotactic radiosurgery: Right upper lobe nodule.  5400 cGy given in 3 fractions. Completed July 9, 2021.    SBRT delivered to a right lower lobe nodule.  5400 cGy in 3 fractions.  October 6 through October 11, 2023.    Interim History:    Cosme returns today for follow-up visit.  Doing okay today.  No shortness of breath or cough.  No chest pain.    Review of Systems:    As above in the history.     Review of Systems otherwise Negative for:  General: chills, fever or night sweats  Psychological: anxiety or depression  Ophthalmic: blurry vision, double vision or loss of vision, vision change  ENT: epistaxis, oral lesions, hearing changes  Hematological and Lymphatic: bleeding, bruising, jaundice, swollen lymph nodes  Endocrine: hot flashes, unexpected weight changes  Respiratory: cough, hemoptysis, orthopnea  Cardiovascular: chest pain, edema, palpitations or PND  Gastrointestinal: abdominal pain, blood in stools, change in bowel habits, constipation, diarrhea or nausea/vomiting  Genito-Urinary: change in urinary stream, incontinence, frequency/urgency  Musculoskeletal: joint stiffness, swelling, muscle pain  Neurological: dizziness, headaches,  "numbness/tingling  Dermatological: lumps and rash    Past History:    Past Medical History:   Diagnosis Date    COPD (chronic obstructive pulmonary disease) (H)     Coronary artery disease due to lipid rich plaque 02/2021    Abnormal coronary CTA    Family history of myocardial infarction     mother under age 60's  CVA and MI    High cholesterol     Hypertension     PVC's (premature ventricular contractions)     Syncope 2012    Tubular adenoma of colon      Physical Exam:    /84   Pulse 81   Resp 18   Ht 1.676 m (5' 6\")   Wt 69.4 kg (153 lb)   SpO2 96%   BMI 24.69 kg/m      General: patient appears stated age of 76 year old. Nontoxic and in no distress.   HEENT: Head: atraumatic, normocephalic.  Sclerae anicteric.  Chest:  Normal respiratory effort  Cardiac:  No edema.   Abdomen: abdomen is non-distended  Extremities: normal tone and muscle bulk.  Skin: no lesions or rash on visible skin. Warm and dry.   CNS: alert and oriented. Grossly non-focal.   Psychiatric: normal mood and affect.     Lab Results:    Recent Results (from the past 168 hour(s))   Creatinine POCT   Result Value Ref Range    Creatinine POCT 0.8 0.6 - 1.1 mg/dL    GFR, ESTIMATED POCT >60 >60 mL/min/1.73m2      Imaging:    CT Chest w Contrast    Result Date: 1/19/2024  EXAM: CT CHEST W CONTRAST LOCATION: [facility] DATE: 1/19/2024 INDICATION: Follow up lung cancer lung. Nodules COMPARISON: 09/08/2023, 07/21/2023, 02/17/2023, 10/21/2022, 06/10/2022 TECHNIQUE: CT chest with IV contrast. Multiplanar reformats were obtained. Dose reduction techniques were used. CONTRAST: FINDINGS: LUNGS AND PLEURA: 1.2 x 0.9 cm right upper lobe nodule on series 5 image 62 is unchanged having measured 1.2 x 1.0 cm on 07/21/2023. Adjacent pneumonitis compatible with evolving posttreatment changes is not significantly changed. A spiculated nodule in the right lower lobe measuring 6 mm on image 120 is also unchanged. No new or enlarging " nodules. No pleural effusions. Emphysema with advanced destructive changes. MEDIASTINUM/AXILLAE: No adenopathy. Normal heart size. No pericardial effusions. Moderate calcified plaque in the thoracic aorta. CORONARY ARTERY CALCIFICATION: Mild to moderate UPPER ABDOMEN: Benign right renal cyst. No follow-up needed. Benign hemangioma or other benign vascular lesion in the left hepatic lobe, unchanged from 06/10/2022. MUSCULOSKELETAL: Bones are demineralized. Degenerative hypertrophic changes in the thoracic spine.     IMPRESSION: 1.  A 1.2 x 0.9 cm right upper lobe neoplasm is unchanged from 07/21/2023. Adjacent pneumonitis/treatment change is also unchanged. 2.  A 6 mm spiculated nodule in the right lower lobe is also unchanged. 3.  No new or enlarging nodules. 4.  Emphysema with advanced destructive changes.        Signed by: Timoteo Barnhart MD

## 2024-01-22 NOTE — LETTER
"    1/22/2024         RE: Shantel Romero  [address]  [place] [address]        Dear Colleague,    Thank you for referring your patient, Shantel Romero, to the [facility]. Please see a copy of my visit note below.    Oncology Rooming Note    January 22, 2024 1:01 PM   Shantel Romero is a 78 year old female who presents for:    Chief Complaint   Patient presents with     Oncology Clinic Visit     Malignant neoplasm of lower lobe of right lung     Initial Vitals: /84   Pulse 81   Resp 18   Ht 1.676 m (5' 6\")   Wt 69.4 kg (153 lb)   SpO2 96%   BMI 24.69 kg/m   Estimated body mass index is 24.69 kg/m  as calculated from the following:    Height as of this encounter: 1.676 m (5' 6\").    Weight as of this encounter: 69.4 kg (153 lb). Body surface area is 1.8 meters squared.  Mild Pain (3) Comment: Data Unavailable   No LMP recorded. Patient is postmenopausal.  Allergies reviewed: Yes  Medications reviewed: Yes    Medications: Medication refills not needed today.  Pharmacy name entered into [employer]: [id number] [place] [facility] [place] [address] [address] [facility] [address]    Frailty Screening:   Is the patient here for a new oncology consult visit in cancer care? 2. No      Clinical concerns:  CT results       Luz Matos              [facility] Hematology and Oncology Progress Note    Patient: Shantel Romero  MRN: [id number]  Date of Service: Jan 22, 2024        Assessment and Plan:    1.  Right-sided lung cancer: She just had a follow-up CT scan of the chest.  I personally reviewed the images and shared them with Cosme.  The right upper lobe nodule, previously treated, is stable.  The recently treated right lower lobe nodule is also much improved and almost resolved by my eye.  No new nodules.  She is doing well clinically.  Will have her return to clinic in 6 months with repeat imaging.  She can let us know in the interim " "if she develops any new symptoms.      ECOG Performance  1    Diagnosis:    1.  Right upper lobe lung mass: Initially seen in 2019. Has not been biopsied.    Treatment:    Stereotactic radiosurgery: Right upper lobe nodule.  5400 cGy given in 3 fractions. Completed July 9, 2021.    SBRT delivered to a right lower lobe nodule.  5400 cGy in 3 fractions.  October 6 through October 11, 2023.    Interim History:    Jan returns today for follow-up visit.  Doing okay today.  No shortness of breath or cough.  No chest pain.    Review of Systems:    As above in the history.     Review of Systems otherwise Negative for:  General: chills, fever or night sweats  Psychological: anxiety or depression  Ophthalmic: blurry vision, double vision or loss of vision, vision change  ENT: epistaxis, oral lesions, hearing changes  Hematological and Lymphatic: bleeding, bruising, jaundice, swollen lymph nodes  Endocrine: hot flashes, unexpected weight changes  Respiratory: cough, hemoptysis, orthopnea  Cardiovascular: chest pain, edema, palpitations or PND  Gastrointestinal: abdominal pain, blood in stools, change in bowel habits, constipation, diarrhea or nausea/vomiting  Genito-Urinary: change in urinary stream, incontinence, frequency/urgency  Musculoskeletal: joint stiffness, swelling, muscle pain  Neurological: dizziness, headaches, numbness/tingling  Dermatological: lumps and rash    Past History:    Past Medical History:   Diagnosis Date     COPD (chronic obstructive pulmonary disease) (H)      Coronary artery disease due to lipid rich plaque 02/2021    Abnormal coronary CTA     Family history of myocardial infarction     mother under age 60's  CVA and MI     High cholesterol      Hypertension      PVC's (premature ventricular contractions)      Syncope 2012     Tubular adenoma of colon      Physical Exam:    /84   Pulse 81   Resp 18   Ht 1.676 m (5' 6\")   Wt 69.4 kg (153 lb)   SpO2 96%   BMI 24.69 kg/m      General: " patient appears stated age of 76 year old. Nontoxic and in no distress.   HEENT: Head: atraumatic, normocephalic.  Sclerae anicteric.  Chest:  Normal respiratory effort  Cardiac:  No edema.   Abdomen: abdomen is non-distended  Extremities: normal tone and muscle bulk.  Skin: no lesions or rash on visible skin. Warm and dry.   CNS: alert and oriented. Grossly non-focal.   Psychiatric: normal mood and affect.     Lab Results:    Recent Results (from the past 168 hour(s))   Creatinine POCT   Result Value Ref Range    Creatinine POCT 0.8 0.6 - 1.1 mg/dL    GFR, ESTIMATED POCT >60 >60 mL/min/1.73m2      Imaging:    CT Chest w Contrast    Result Date: 1/19/2024  EXAM: CT CHEST W CONTRAST LOCATION: [facility] DATE: 1/19/2024 INDICATION: Follow up lung cancer lung. Nodules COMPARISON: 09/08/2023, 07/21/2023, 02/17/2023, 10/21/2022, 06/10/2022 TECHNIQUE: CT chest with IV contrast. Multiplanar reformats were obtained. Dose reduction techniques were used. CONTRAST: FINDINGS: LUNGS AND PLEURA: 1.2 x 0.9 cm right upper lobe nodule on series 5 image 62 is unchanged having measured 1.2 x 1.0 cm on 07/21/2023. Adjacent pneumonitis compatible with evolving posttreatment changes is not significantly changed. A spiculated nodule in the right lower lobe measuring 6 mm on image 120 is also unchanged. No new or enlarging nodules. No pleural effusions. Emphysema with advanced destructive changes. MEDIASTINUM/AXILLAE: No adenopathy. Normal heart size. No pericardial effusions. Moderate calcified plaque in the thoracic aorta. CORONARY ARTERY CALCIFICATION: Mild to moderate UPPER ABDOMEN: Benign right renal cyst. No follow-up needed. Benign hemangioma or other benign vascular lesion in the left hepatic lobe, unchanged from 06/10/2022. MUSCULOSKELETAL: Bones are demineralized. Degenerative hypertrophic changes in the thoracic spine.     IMPRESSION: 1.  A 1.2 x 0.9 cm right upper lobe neoplasm is unchanged from  07/21/2023. Adjacent pneumonitis/treatment change is also unchanged. 2.  A 6 mm spiculated nodule in the right lower lobe is also unchanged. 3.  No new or enlarging nodules. 4.  Emphysema with advanced destructive changes.        Signed by: Timoteo Barnhart MD      Again, thank you for allowing me to participate in the care of your patient.        Sincerely,        Timoteo Barnhart MD

## 2024-01-22 NOTE — PROGRESS NOTES
"Oncology Rooming Note    January 22, 2024 1:01 PM   Shantel Romero is a 78 year old female who presents for:    Chief Complaint   Patient presents with    Oncology Clinic Visit     Malignant neoplasm of lower lobe of right lung     Initial Vitals: /84   Pulse 81   Resp 18   Ht 1.676 m (5' 6\")   Wt 69.4 kg (153 lb)   SpO2 96%   BMI 24.69 kg/m   Estimated body mass index is 24.69 kg/m  as calculated from the following:    Height as of this encounter: 1.676 m (5' 6\").    Weight as of this encounter: 69.4 kg (153 lb). Body surface area is 1.8 meters squared.  Mild Pain (3) Comment: Data Unavailable   No LMP recorded. Patient is postmenopausal.  Allergies reviewed: Yes  Medications reviewed: Yes    Medications: Medication refills not needed today.  Pharmacy name entered into [employer]: [facility] #[id number] - [address] AT [facility] [address]    Frailty Screening:   Is the patient here for a new oncology consult visit in cancer care? 2. No      Clinical concerns:  CT results       Luz Matos            "

## 2024-02-01 ENCOUNTER — OFFICE VISIT (OUTPATIENT)
Dept: CARDIOLOGY | Facility: CLINIC | Age: 79
End: 2024-02-01
Payer: MEDICARE

## 2024-02-01 VITALS
OXYGEN SATURATION: 90 % | BODY MASS INDEX: 25.05 KG/M2 | DIASTOLIC BLOOD PRESSURE: 68 MMHG | SYSTOLIC BLOOD PRESSURE: 148 MMHG | HEART RATE: 84 BPM | WEIGHT: 155.2 LBS | RESPIRATION RATE: 16 BRPM

## 2024-02-01 DIAGNOSIS — E78.5 HYPERLIPIDEMIA LDL GOAL <70: ICD-10-CM

## 2024-02-01 DIAGNOSIS — I25.10 CORONARY ARTERY DISEASE DUE TO LIPID RICH PLAQUE: ICD-10-CM

## 2024-02-01 DIAGNOSIS — I25.83 CORONARY ARTERY DISEASE DUE TO LIPID RICH PLAQUE: ICD-10-CM

## 2024-02-01 PROCEDURE — 99213 OFFICE O/P EST LOW 20 MIN: CPT | Performed by: INTERNAL MEDICINE

## 2024-02-01 RX ORDER — EZETIMIBE 10 MG/1
10 TABLET ORAL DAILY
Qty: 90 TABLET | Refills: 11 | Status: SHIPPED | OUTPATIENT
Start: 2024-02-01

## 2024-02-01 RX ORDER — NITROGLYCERIN 40 MG/1
1 PATCH TRANSDERMAL DAILY
Qty: 90 PATCH | Refills: 11 | Status: SHIPPED | OUTPATIENT
Start: 2024-02-01

## 2024-02-01 NOTE — PROGRESS NOTES
"  Thank you, Dr. Denise, for asking the [facility] team to see Ms. Shantel Romero to evaluate       Assessment/Recommendations   Assessment/Plan:  CAD mild dz - on lipid therapy and asp 81mg, had chest pain in the past, on NTG patch to prevent spasm  CV prevention - on atorvastatin/zetia, LDL at target.     Follow up one year     History of Present Illness/Subjective    Ms. Shantel Romero is a 78 year old female with mild CAD, no ischemia 11/22, HN, last LDL 59, no chest pain/pressure on exertion, she has COPD and has dyspnea on exertion, no syncopal event,s no palptiations, at night, she goes to bed early, wakes at 2AM, lays there are reads for ocuple hours. Heart the goes, \"whoop\" or going up and down in her chest, takes a couple breaths and then resolves, going on for years.  S/p XRT lung for cancer, appears stable, followed by oncology (Dr. Barnhart).   Stopped keith.  Loves to read mystery books, has a lot in her basement she wants to donate.           Physical Examination Review of Systems   BP (!) 148/68 (BP Location: Right arm, Patient Position: Sitting, Cuff Size: Adult Regular)   Pulse 84   Resp 16   Wt 70.4 kg (155 lb 3.2 oz)   SpO2 90%   BMI 25.05 kg/m    Body mass index is 25.05 kg/m .  Wt Readings from Last 3 Encounters:   02/01/24 70.4 kg (155 lb 3.2 oz)   01/22/24 69.4 kg (153 lb)   10/13/23 68 kg (150 lb)     [unfilled]  General Appearance:   no distress, normal body habitus   ENT/Mouth: membranes moist, no oral lesions or bleeding gums.      EYES:  no scleral icterus, normal conjunctivae   Neck: no carotid bruits or thyromegaly   Chest/Lungs:   lungs are clear to auscultation, no rales or wheezing,  sternal scar, equal chest wall expansion    Cardiovascular:   Regular. Normal first and second heart sounds with no murmurs, rubs, or gallops; the carotid, radial and posterior tibial pulses are intact, Jugular venous pressure , edema bilaterally    Abdomen:  no organomegaly, " masses, bruits, or tenderness; bowel sounds are present   Extremities: no cyanosis or clubbing   Skin: no xanthelasma, warm.    Neurologic: normal  bilateral, no tremors     Psychiatric: alert and oriented x3, calm     Review of Systems - 12 points nega other than above      Medical History  Surgical History Family History Social History   Past Medical History:   Diagnosis Date    COPD (chronic obstructive pulmonary disease) (H)     Coronary artery disease due to lipid rich plaque 2021    Abnormal coronary CTA    Family history of myocardial infarction     mother under age 60's  CVA and MI    High cholesterol     Hypertension     PVC's (premature ventricular contractions)     Syncope     Tubular adenoma of colon     Past Surgical History:   Procedure Laterality Date    APPENDECTOMY      SHOULDER SURGERY Right     TUBAL LIGATION      Family History   Problem Relation Age of Onset    Lung Cancer Father         lung    Breast Cancer Paternal Aunt     Cerebrovascular Disease Mother     Coronary Artery Disease Mother     Social History     Socioeconomic History    Marital status:      Spouse name: Not on file    Number of children: Not on file    Years of education: Not on file    Highest education level: Not on file   Occupational History    Not on file   Tobacco Use    Smoking status: Former     Types: Cigarettes     Quit date: 2019     Years since quittin.0    Smokeless tobacco: Never   Vaping Use    Vaping Use: Never used   Substance and Sexual Activity    Alcohol use: Not on file    Drug use: Not on file    Sexual activity: Not on file   Other Topics Concern    Parent/sibling w/ CABG, MI or angioplasty before 65F 55M? Not Asked   Social History Narrative    Not on file     Social Determinants of Health     Financial Resource Strain: Not on file   Food Insecurity: Not on file   Transportation Needs: Not on file   Physical Activity: Not on file   Stress: Not on file   Social Connections: Not on  file   Interpersonal Safety: Not on file   Housing Stability: Not on file          Medications  Allergies   Scheduled Meds:  Continuous Infusions:  PRN Meds:. No Known Allergies      Lab Results    Chemistry/lipid CBC Cardiac Enzymes/BNP/TSH/INR   Lab Results   Component Value Date    CHOL 167 10/19/2023    HDL 98 10/19/2023    TRIG 51 10/19/2023    BUN 14.1 10/19/2023     10/19/2023    CO2 25 10/19/2023    Lab Results   Component Value Date    WBC 8.5 06/13/2022    HGB 12.1 07/18/2022    HCT 42.0 06/13/2022     (H) 06/13/2022     06/13/2022    Lab Results   Component Value Date    TROPONINI <0.01 08/09/2022    TSH 1.55 10/07/2019    INR 1.00 01/29/2021              Elliot Cabrera MD  Interventional Cardiology  [facility]

## 2024-02-01 NOTE — LETTER
"2/1/2024    LUCRETIA ROSARIO  [address] [facility] [address]    RE: Shantel Romero       Dear Colleague,     I had the pleasure of seeing Shantel Romero in the [facility].    Thank you, Dr. Denise, for asking the [facility] Heart Care team to see Ms. Shantel Romero to evaluate       Assessment/Recommendations   Assessment/Plan:  CAD mild dz - on lipid therapy and asp 81mg, had chest pain in the past, on NTG patch to prevent spasm  CV prevention - on atorvastatin/zetia, LDL at target.     Follow up one year     History of Present Illness/Subjective    Ms. Shantel Romero is a 78 year old female with mild CAD, no ischemia 11/22, HN, last LDL 59, no chest pain/pressure on exertion, she has COPD and has dyspnea on exertion, no syncopal event,s no palptiations, at night, she goes to bed early, wakes at 2AM, lays there are reads for ocuple hours. Heart the goes, \"whoop\" or going up and down in her chest, takes a couple breaths and then resolves, going on for years.  S/p XRT lung for cancer, appears stable, followed by oncology (Dr. Barnhart).   Stopped tob.  Loves to read mystery books, has a lot in her basement she wants to donate.           Physical Examination Review of Systems   BP (!) 148/68 (BP Location: Right arm, Patient Position: Sitting, Cuff Size: Adult Regular)   Pulse 84   Resp 16   Wt 70.4 kg (155 lb 3.2 oz)   SpO2 90%   BMI 25.05 kg/m    Body mass index is 25.05 kg/m .  Wt Readings from Last 3 Encounters:   02/01/24 70.4 kg (155 lb 3.2 oz)   01/22/24 69.4 kg (153 lb)   10/13/23 68 kg (150 lb)     [unfilled]  General Appearance:   no distress, normal body habitus   ENT/Mouth: membranes moist, no oral lesions or bleeding gums.      EYES:  no scleral icterus, normal conjunctivae   Neck: no carotid bruits or thyromegaly   Chest/Lungs:   lungs are clear to auscultation, no rales or wheezing,  sternal scar, equal chest wall expansion    Cardiovascular:   Regular. " Normal first and second heart sounds with no murmurs, rubs, or gallops; the carotid, radial and posterior tibial pulses are intact, Jugular venous pressure , edema bilaterally    Abdomen:  no organomegaly, masses, bruits, or tenderness; bowel sounds are present   Extremities: no cyanosis or clubbing   Skin: no xanthelasma, warm.    Neurologic: normal  bilateral, no tremors     Psychiatric: alert and oriented x3, calm     Review of Systems - 12 points nega other than above      Medical History  Surgical History Family History Social History   Past Medical History:   Diagnosis Date    COPD (chronic obstructive pulmonary disease) (H)     Coronary artery disease due to lipid rich plaque 2021    Abnormal coronary CTA    Family history of myocardial infarction     mother under age 60's  CVA and MI    High cholesterol     Hypertension     PVC's (premature ventricular contractions)     Syncope     Tubular adenoma of colon     Past Surgical History:   Procedure Laterality Date    APPENDECTOMY      SHOULDER SURGERY Right     TUBAL LIGATION      Family History   Problem Relation Age of Onset    Lung Cancer Father         lung    Breast Cancer Paternal Aunt     Cerebrovascular Disease Mother     Coronary Artery Disease Mother     Social History     Socioeconomic History    Marital status:      Spouse name: Not on file    Number of children: Not on file    Years of education: Not on file    Highest education level: Not on file   Occupational History    Not on file   Tobacco Use    Smoking status: Former     Types: Cigarettes     Quit date: 2019     Years since quittin.0    Smokeless tobacco: Never   Vaping Use    Vaping Use: Never used   Substance and Sexual Activity    Alcohol use: Not on file    Drug use: Not on file    Sexual activity: Not on file   Other Topics Concern    Parent/sibling w/ CABG, MI or angioplasty before 65F 55M? Not Asked   Social History Narrative    Not on file     Social  Determinants of Health     Financial Resource Strain: Not on file   Food Insecurity: Not on file   Transportation Needs: Not on file   Physical Activity: Not on file   Stress: Not on file   Social Connections: Not on file   Interpersonal Safety: Not on file   Housing Stability: Not on file          Medications  Allergies   Scheduled Meds:  Continuous Infusions:  PRN Meds:. No Known Allergies      Lab Results    Chemistry/lipid CBC Cardiac Enzymes/BNP/TSH/INR   Lab Results   Component Value Date    CHOL 167 10/19/2023    HDL 98 10/19/2023    TRIG 51 10/19/2023    BUN 14.1 10/19/2023     10/19/2023    CO2 25 10/19/2023    Lab Results   Component Value Date    WBC 8.5 06/13/2022    HGB 12.1 07/18/2022    HCT 42.0 06/13/2022     (H) 06/13/2022     06/13/2022    Lab Results   Component Value Date    TROPONINI <0.01 08/09/2022    TSH 1.55 10/07/2019    INR 1.00 01/29/2021              Elliot Cabrera MD  Interventional Cardiology  [facility]              Thank you for allowing me to participate in the care of your patient.      Sincerely,     Elliot Cabrera MD     [facility]  cc:   Shivam Denise MD  [address]  [place] [address]

## 2024-02-11 DIAGNOSIS — I25.83 CORONARY ARTERY DISEASE DUE TO LIPID RICH PLAQUE: ICD-10-CM

## 2024-02-11 DIAGNOSIS — I25.10 CORONARY ARTERY DISEASE DUE TO LIPID RICH PLAQUE: ICD-10-CM

## 2024-02-12 RX ORDER — NITROGLYCERIN 40 MG/1
PATCH TRANSDERMAL
Qty: 90 PATCH | Refills: 11 | OUTPATIENT
Start: 2024-02-12

## 2024-02-13 ENCOUNTER — OFFICE VISIT (OUTPATIENT)
Dept: PULMONOLOGY | Facility: CLINIC | Age: 79
End: 2024-02-13
Attending: NURSE PRACTITIONER
Payer: MEDICARE

## 2024-02-13 VITALS
DIASTOLIC BLOOD PRESSURE: 74 MMHG | BODY MASS INDEX: 24.37 KG/M2 | WEIGHT: 151 LBS | HEART RATE: 93 BPM | OXYGEN SATURATION: 96 % | SYSTOLIC BLOOD PRESSURE: 126 MMHG

## 2024-02-13 DIAGNOSIS — J44.9 CHRONIC OBSTRUCTIVE PULMONARY DISEASE, UNSPECIFIED COPD TYPE (H): Primary | ICD-10-CM

## 2024-02-13 DIAGNOSIS — C34.11 MALIGNANT NEOPLASM OF UPPER LOBE OF RIGHT LUNG (H): ICD-10-CM

## 2024-02-13 PROCEDURE — 99214 OFFICE O/P EST MOD 30 MIN: CPT | Performed by: NURSE PRACTITIONER

## 2024-02-13 NOTE — PATIENT INSTRUCTIONS
It was a pleasure seeing you in clinic today. This is what we discussed:    Continue the Trelegy once daily as you have been. Remember to rinse and gargle after each use.   Use your albuterol every 4 hours as needed for cough, shortness of breath, wheeze, or chest tightness.   Follow up 6 months   If you have worsening symptoms, questions, or need to speak with the nurse please call [phone].

## 2024-02-13 NOTE — PROGRESS NOTES
Pulmonary Medicine Consult   February 13, 2024      Assessment and Plan:   Shantel Romero is a 78 year old female former smoker with history of COPD, emphysema, CAD, HLD, HTN, and lung cancer diagnosed in 2019 and again in 2023 s/p radiation presenting today for follow up.    #1. Dyspnea on exertion -- minimal   #2. COPD GOLD B -- no flares since last visit. PFTs show moderate-severe obstructive disease with a moderately reduced diffusion capacity.  #3. Emphysema  #4. Right upper lobe lung mass -- completed SBRT in July 2021 for right upper lobe cancer. She sees Dr. Barnhart in oncology and radiation oncology. Completed radiation for the slowly evolving RLL nodule in 10/2023. Per rad onc note she is not interested in any further invasive procedures. Last CT in 01/2024 showed stability. Plans for 6 month follow up.     CONTINUE Trelegy 1 puff daily.  When she finishes her 3-month supply she will let me know if she wants to stick with this or change back to Breo and Wixela.  CONTINUE Albuterol inhaler every 4 hours as needed for increased shortness of breath or chest tightness. You can use the spacer with this to help you get the medication into your lungs  Try and increase exercise as you are able.   Not interested in pulmonary rehab at this time.  UTD with COVID booster, and pneumonia vaccine. Recommend seasonal flu and RSV vaccine.     RTC 6 months    If her symptoms exacerbate: Prednisone 40 mg daily x 5 days.  If sputum amount and thickness increased add azithromycin Z-Jimi x 5 days.    Maura Turner, CNP  Pulmonary Medicine  [facility]   [phone]    ___________________________________________________________________    CC:   Chief Complaint   Patient presents with    Follow Up     Chronic obstructive pulmonary disease, unspecified COPD type (H)  Dyspnea on exertion  Malignant neoplasm of upper lobe of right lung (H          HPI:   She was last seen in clinic in 10/2023. At that time she was started  on Trelegy to simplify her inhaled regimen. She has been using this daily. She has noted some dizziness but is unsure if it is the Trelegy as she has been struggling with tinnitus.    No exacerbations since her last visit.   Reports ongoing occasional LUKE that she believes to be partially related to anxiety.  Previously notes Clonazepam helps the most. Is not very active.    She had previously been having issues using an HFA inhaler and when demonstrating technique in clinic was found to be using this incorrectly likely was not getting any medication.  Denies cough, chest tightness, or wheeze.   PND uses Flonase with some relief. Denies sinus pressure or congestion.   Still has intermittent chest pain. Followed by cardiology, uses nitroglycerin with relief.     She was previously seen at [facility] by Dr. Jay Gonzalez. She had PFTs done and was evaluated when she was initially diagnosed with lung cancer in 2019. Those records were not available.      Patient supplied answers from flow sheet for:  COPD Assessment Test (CAT)  2009 [employer]. All rights reserved.      8/8/2022     1:00 PM 10/10/2022    12:00 PM 4/7/2023     3:04 PM 10/13/2023    12:31 PM 2/10/2024     1:50 PM   COPD assessment test (CAT)   Cough 0 5 1 2 0   Phlegm 0 2 1 1 0   Chest tightness 3 5 4 3 0   Walk up hill 5 3 4 4 1   Limited activities 5 3 3 3 1   Leaving my home 0 0 1 0 0   Sleep 3 0 2 1 1   Energy 5 4 3 3 2   Total Score 21 22 19 17 5        ROS:   10 point ROS negative unless otherwise noted in HPI    Physical Exam:  /74 (BP Location: Right arm, Patient Position: Sitting, Cuff Size: Adult Regular)   Pulse 93   Wt 68.5 kg (151 lb)   SpO2 96%   BMI 24.37 kg/m      Physical Exam  Constitutional:       General: She is not in acute distress.     Appearance: She is not ill-appearing or diaphoretic.   HENT:      Nose: Nose normal.   Cardiovascular:      Rate and Rhythm: Normal rate and regular rhythm.      Pulses: Normal pulses.       Heart sounds: Normal heart sounds.   Pulmonary:      Effort: Pulmonary effort is normal. No respiratory distress.      Breath sounds: Normal breath sounds. No wheezing or rhonchi.   Musculoskeletal:      Right lower leg: No edema.      Left lower leg: No edema.   Skin:     General: Skin is warm and dry.   Neurological:      Mental Status: She is alert.   Psychiatric:         Behavior: Behavior normal.       PMH:  Patient Active Problem List    Diagnosis Date Noted    Malignant neoplasm of lower lobe of right lung (H) 2023     Priority: Medium    Abnormal electrocardiogram 2022     Priority: Medium    Malignant neoplasm of upper lobe of right lung (H) 2021     Priority: Medium    Mass of upper lobe of right lung 2021     Priority: Medium    Tobacco abuse 2021     Priority: Medium    Hypertension, unspecified type      Priority: Medium    Dyspnea on exertion      Priority: Medium    Coronary artery disease due to lipid rich plaque      Priority: Medium    Chest pain 2021     Priority: Medium    Osteopenia      Priority: Medium     Created by Conversion  Replacement Utility updated for latest IMO load        Premature Menopause      Priority: Medium     Created by Conversion           PSH:  Past Surgical History:   Procedure Laterality Date    APPENDECTOMY      SHOULDER SURGERY Right     TUBAL LIGATION         Family HX: family history includes Breast Cancer in her paternal aunt; Cerebrovascular Disease in her mother; Coronary Artery Disease in her mother; Lung Cancer in her father.    Social Hx:  Social History     Socioeconomic History    Marital status:      Spouse name: Not on file    Number of children: Not on file    Years of education: Not on file    Highest education level: Not on file   Occupational History    Not on file   Tobacco Use    Smoking status: Former     Types: Cigarettes     Quit date:      Years since quittin.1    Smokeless tobacco: Never    Vaping Use    Vaping Use: Never used   Substance and Sexual Activity    Alcohol use: Not on file    Drug use: Not on file    Sexual activity: Not on file   Other Topics Concern    Parent/sibling w/ CABG, MI or angioplasty before 65F 55M? Not Asked   Social History Narrative    Not on file     Social Determinants of Health     Financial Resource Strain: Not on file   Food Insecurity: Not on file   Transportation Needs: Not on file   Physical Activity: Not on file   Stress: Not on file   Social Connections: Not on file   Interpersonal Safety: Not on file   Housing Stability: Not on file         Current Meds:  Current Outpatient Medications   Medication Sig Dispense Refill    acetaminophen (TYLENOL) 500 MG tablet Take 500-1,000 mg by mouth every 8 hours as needed       aspirin (ASA) 81 MG EC tablet Take 81 mg by mouth daily      atorvastatin (LIPITOR) 40 MG tablet Take 40 mg by mouth At Bedtime      Cholecalciferol 250 MCG (03415 UT) TABS Take 10,000 Units by mouth daily      ezetimibe (ZETIA) 10 MG tablet Take 1 tablet (10 mg) by mouth daily 90 tablet 11    famotidine (PEPCID) 20 MG tablet Take 1 tablet (20 mg) by mouth 2 times daily 180 tablet 3    fluticasone (FLONASE) 50 MCG/ACT nasal spray Spray 2 sprays in nostril as needed      Fluticasone-Umeclidin-Vilanterol (TRELEGY ELLIPTA) 200-62.5-25 MCG/ACT oral inhaler Inhale 1 puff into the lungs daily 180 each 3    HEMP OIL OR EXTRACT OR OTHER CBD CANNABINOID, NOT MEDICAL CANNABIS, 1 gummy at HS for sleep-delta 9      hydrOXYzine HCl (ATARAX) 25 MG tablet TAKE 1 TABLET BY MOUTH EVERY 6 HOURS AS NEEDED FOR ANXIETY OR STRESS      IBANdronate (BONIVA) 150 MG tablet Take 150 mg by mouth every 30 days      lisinopril (ZESTRIL) 5 MG tablet Take 2.5 mg by mouth daily      melatonin 3 MG CAPS Take 1 capsule by mouth At Bedtime      Multiple Vitamin (MULTIVITAMIN ADULT PO) Take 1 tablet by mouth daily      nicotine polacrilex (NICORETTE) 4 MG gum Take 4 mg by mouth every hour  as needed      nitroGLYcerin (NITRODUR) 0.2 MG/HR 24 hr patch Place 1 patch onto the skin daily 90 patch 11    nitroGLYcerin (NITROSTAT) 0.4 MG sublingual tablet For chest pain place 1 tablet under the tongue every 5 minutes for 3 doses. If symptoms persist 5 minutes after 1st dose call 911. 25 tablet 4     Allergies:  No Known Allergies    Labs:   personally reviewed in the EMR.    Imaging studies: personally reviewed and interpreted. Below are the Radiology interpretations.      PFT's (7/8/2022): abnormal  FEV1/FVC is 60% and is reduced.  FEV1 is 1.13L (53%) predicted and is reduced  FVC is 1.88L (67%) predicted and is reduced  There was improvement in spirometry after a single inhaled dose of bronchodilator.  TLC is 4.77L (90%) predicted and is normal.  RV is 2.89L (126%) predicted and is normal.  DLCO is10.57ml/min/hg (52%) predicted and is reduced when it is corrected for hemoglobin.  Flow volume loops indicate scooping.    Impression:  Full Pulmonary Function Test is abnormal.  PFTs are consistent with moderate-severe obstructive disease.  Spirometry is consistent with reversibility.  There is no hyperinflation.  There is no air-trapping.  Diffusion capacity when corrected for hemoglobin is moderately reduced.      PET ONCOLOGY WHOLE BODY, DATE: 9/8/2023  INDICATION: Subsequent treatment planning and restaging for malignant neoplasm of upper lobe, right bronchus or lung. Status post radiation therapy completed in July 2021. Monitor treatment response.  COMPARISON: Thoracic CT dated 07/21/2023, 02/17/2023, 10/21/2022, and 02/11/2022. FDG PET/CT dated 02/04/2021  PET/CT FINDINGS: FDG avid nodule in the lateral right upper lobe measuring 1.0 x 1.0 cm (max SUV 3.2, previously 2.1) with surrounding FDG avid airspace opacity (max SUV 1.9).  Spiculated FDG avid nodule in the posterior right lower lobe measuring 0.9 x 0.7 cm (max SUV 2.8, previously measured 0.6 x 0.5 cm on 10/21/2022) suspicious for early primary  lung adenocarcinoma without metastasis.  Diffuse esophagitis. Shoulder, hip, and knee synovitis. Left upper extremity injection related artifact. Development of a groundglass opacity in the more anterior right lower lobe (max SUV 4.2) and is favored to be inflammatory in nature  CT FINDINGS: Postoperative change of the bilateral lenses. Mild carotid artery bifurcation calcification. Mild to moderate coronary artery calcium. Right renal cyst. Sigmoid diverticulosis. Multilevel degenerative changes of the mildly scoliotic spine.   The lower extremities are unremarkable.                                                                IMPRESSION:  1. FDG avid nodule in the lateral right upper lobe with surrounding FDG avid airspace opacity. While this may represent evolving posttreatment change, the paradoxical increase in metabolic activity of the right upper lobe nodule is somewhat atypical and warrants continued imaging surveillance as this could represent early disease recurrence.  2. Findings suspicious for a synchronous early right lower lobe primary lung adenocarcinoma without metastasis.    Chest CT 6/10/22:  IMPRESSION:  1.  Stable 14 x 10 mm nodule right upper lobe since 02/11/2022.  2.  New mild radiation pneumonitis around this nodule.  3.  No significant new findings.    Echo 1/30/2021:  Narrative & Impression    No previous study for comparison.    Normal left ventricular size. Mild left ventricular hypertrophy    Left ventricle ejection fraction is normal. The calculated left ventricular ejection fraction is 67%.    Possible mild right ventricular enlargement.    Normal right ventricular systolic function.    Mild right atrial enlargement.    Aortic valve sclerosis without evidence for significant aortic stenosis. Mild aortic insufficiency.    Trace pericardial effusion suggested

## 2024-07-19 ENCOUNTER — HOSPITAL ENCOUNTER (OUTPATIENT)
Dept: CT IMAGING | Facility: CLINIC | Age: 79
Discharge: HOME | End: 2024-07-19
Attending: INTERNAL MEDICINE | Admitting: INTERNAL MEDICINE
Payer: MEDICARE

## 2024-07-19 DIAGNOSIS — C34.11 MALIGNANT NEOPLASM OF UPPER LOBE OF RIGHT LUNG (H): ICD-10-CM

## 2024-07-19 LAB
CREAT BLD-MCNC: 0.8 MG/DL (ref 0.6–1.1)
EGFRCR SERPLBLD CKD-EPI 2021: >60 ML/MIN/1.73M2

## 2024-07-19 PROCEDURE — 82565 ASSAY OF CREATININE: CPT

## 2024-07-19 PROCEDURE — 71260 CT THORAX DX C+: CPT | Mod: MG

## 2024-07-19 PROCEDURE — 250N000011 HC RX IP 250 OP 636: Performed by: INTERNAL MEDICINE

## 2024-07-19 RX ORDER — IOPAMIDOL 755 MG/ML
90 INJECTION, SOLUTION INTRAVASCULAR ONCE
Status: COMPLETED | OUTPATIENT
Start: 2024-07-19 | End: 2024-07-19

## 2024-07-19 RX ADMIN — IOPAMIDOL 90 ML: 755 INJECTION, SOLUTION INTRAVENOUS at 12:41

## 2024-07-22 ENCOUNTER — ONCOLOGY VISIT (OUTPATIENT)
Dept: ONCOLOGY | Facility: HOSPITAL | Age: 79
End: 2024-07-22
Attending: INTERNAL MEDICINE
Payer: MEDICARE

## 2024-07-22 VITALS
HEART RATE: 79 BPM | OXYGEN SATURATION: 97 % | DIASTOLIC BLOOD PRESSURE: 86 MMHG | BODY MASS INDEX: 25.07 KG/M2 | HEIGHT: 66 IN | WEIGHT: 156 LBS | SYSTOLIC BLOOD PRESSURE: 144 MMHG | RESPIRATION RATE: 16 BRPM

## 2024-07-22 DIAGNOSIS — C34.11 MALIGNANT NEOPLASM OF UPPER LOBE OF RIGHT LUNG (H): Primary | ICD-10-CM

## 2024-07-22 PROCEDURE — G0463 HOSPITAL OUTPT CLINIC VISIT: HCPCS | Performed by: INTERNAL MEDICINE

## 2024-07-22 PROCEDURE — 99214 OFFICE O/P EST MOD 30 MIN: CPT | Performed by: INTERNAL MEDICINE

## 2024-07-22 PROCEDURE — G2211 COMPLEX E/M VISIT ADD ON: HCPCS | Performed by: INTERNAL MEDICINE

## 2024-07-22 ASSESSMENT — PAIN SCALES - GENERAL: PAINLEVEL: EXTREME PAIN (8)

## 2024-07-22 NOTE — LETTER
7/22/2024      Shantel Romero  [address]  [place] [address]      Dear Colleague,    Thank you for referring your patient, Shantel Romero, to the [facility] [place]. Please see a copy of my visit note below.    [facility] Hematology and Oncology Progress Note    Patient: Shantel Romero  MRN: [id number]  Date of Service: Jul 22, 2024        Assessment and Plan:    1.  Right-sided lung cancer: She recently had a CT scan.  I personally reviewed the images and shared them with Celestina and interpreted them for her.  She has some scarring/consolidation around the previously treated area.  Most likely posttreatment fibrosis.  She is asymptomatic.  Will plan on repeat imaging in 6 months.  The radiology report states no new suspicious lesions.    Medical decision Making:  I spent 33 minutes in the care of this patient today, which included time necessary for preparation for the visit, face to face time with the patient, communication of recommendations to the care team, and documentation time.    ECOG Performance  1    Diagnosis:    1.  Right upper lobe lung mass: Initially seen in 2019. Has not been biopsied.    Treatment:    Stereotactic radiosurgery: Right upper lobe nodule. 5400 cGy given in 3 fractions. Completed July 9, 2021.    SBRT delivered to a right lower lobe nodule. 5400 cGy in 3 fractions. October 6 through October 11, 2023.    Interim History:    Cosme returns today for follow-up visit.  She is feeling okay.  No new complaints since her last visit.  Denies chest pain or cough.  Stable dyspnea on exertion.    Review of Systems:    As above in the history.     Review of Systems otherwise Negative for:  General: chills, fever or night sweats  Psychological: anxiety or depression  Ophthalmic: blurry vision, double vision or loss of vision, vision change  ENT: epistaxis, oral lesions, hearing changes  Hematological and Lymphatic: bleeding, bruising, jaundice, swollen  "lymph nodes  Endocrine: hot flashes, unexpected weight changes  Respiratory: cough, hemoptysis, orthopnea  Cardiovascular: chest pain, edema, palpitations or PND  Gastrointestinal: abdominal pain, blood in stools, change in bowel habits, constipation, diarrhea or nausea/vomiting  Genito-Urinary: change in urinary stream, incontinence, frequency/urgency  Musculoskeletal: joint stiffness, swelling, muscle pain  Neurological: dizziness, headaches, numbness/tingling  Dermatological: lumps and rash    Past History:    Past Medical History:   Diagnosis Date     COPD (chronic obstructive pulmonary disease) (H)      Coronary artery disease due to lipid rich plaque 02/2021    Abnormal coronary CTA     Family history of myocardial infarction     mother under age 60's  CVA and MI     High cholesterol      Hypertension      PVC's (premature ventricular contractions)      Syncope 2012     Tubular adenoma of colon      Physical Exam:    BP (!) 144/86   Pulse 79   Resp 16   Ht 1.676 m (5' 6\")   Wt 70.8 kg (156 lb)   SpO2 97%   BMI 25.18 kg/m      General: patient appears stated age of 76 year old. Nontoxic and in no distress.   HEENT: Head: atraumatic, normocephalic.  Sclerae anicteric.  Chest:  Normal respiratory effort  Cardiac:  No edema.   Abdomen: abdomen is non-distended  Extremities: normal tone and muscle bulk.  Skin: no lesions or rash on visible skin. Warm and dry.   CNS: alert and oriented. Grossly non-focal.   Psychiatric: normal mood and affect.     Lab Results:    Recent Results (from the past 168 hour(s))   Creatinine POCT   Result Value Ref Range    Creatinine POCT 0.8 0.6 - 1.1 mg/dL    GFR, ESTIMATED POCT >60 >60 mL/min/1.73m2      Imaging:    CT Chest/Abdomen/Pelvis w Contrast    Result Date: 7/19/2024  EXAM: CT CHEST/ABDOMEN/PELVIS W CONTRAST [facility]ATION: [facility] DATE: 7/19/2024 INDICATION: Metachronous lung cancers treated with SBRT. Lesion in the right upper lobe treated July " 2020 and lesion in the right lower lobe treated October 2023 COMPARISON: CT chest 1/19/2024 and older studies, PET CT 9/8/2023, CT AP 6/10/2022 TECHNIQUE: CT scan of the chest, abdomen, and pelvis was performed following injection of IV contrast. Multiplanar reformats were obtained. Dose reduction techniques were used. CONTRAST: Isovue 370 90mL FINDINGS: LUNGS AND PLEURA: New bandlike consolidation has developed about the treated right lower lobe nodule with a few air bronchograms. This is greatest laterally where it measures almost a centimeter in short axis (coronal image 64). This obscures the site of  the small treated pulmonary nodule. No new suspicious lesions. Posttreatment fibrosis in the right upper lobe and punctate linear opacity in the left posterior costophrenic angle (axial image 275) are stable. Extensive emphysema. No effusions. MEDIASTINUM/AXILLAE: Thyroid is normal. No adenopathy. Aorta and branches are normal caliber. No pulmonary emboli. CORONARY ARTERY CALCIFICATION: Moderate. HEPATOBILIARY: Mild, fatty infiltration the liver. PANCREAS: Normal. SPLEEN: Normal. ADRENAL GLANDS: Normal. KIDNEYS/BLADDER: There are small simple cysts in both kidneys which need no follow-up. BOWEL: Redundant colon with a moderate stool burden. No ascites or peritoneal tumor nodules. Bowel is of normal caliber. LYMPH NODES: Normal. VASCULATURE: Moderate arterial calcifications with ectatic aorta. No. Vessels are PELVIC ORGANS: Tubal ligation clips. MUSCULOSKELETAL: No suspicious bone lesions. Moderate spondylitic changes at L2-3 and L4-5.     IMPRESSION: 1.  Patient's developed new bandlike consolidation surrounding the treated right lower lobe nodule since January exam, presumed post SBRT change. 2.  This obscures the small treated tumor. This can be monitored on continued follow-up. 3.  No new concerning lesions in the chest, abdomen or pelvis. 4.  Stable post treatment change in the right upper lobe. 5.  Other  noncritical findings as noted above..       Signed by: Timoteo Barnhart MD      Again, thank you for allowing me to participate in the care of your patient.        Sincerely,        Timoteo Barnhart MD

## 2024-07-22 NOTE — PROGRESS NOTES
[facility] Hematology and Oncology Progress Note    Patient: Shantel Romero  MRN: [id number]  Date of Service: Jul 22, 2024        Assessment and Plan:    1.  Right-sided lung cancer: She recently had a CT scan.  I personally reviewed the images and shared them with Celestina and interpreted them for her.  She has some scarring/consolidation around the previously treated area.  Most likely posttreatment fibrosis.  She is asymptomatic.  Will plan on repeat imaging in 6 months.  The radiology report states no new suspicious lesions.    Medical decision Making:  I spent 33 minutes in the care of this patient today, which included time necessary for preparation for the visit, face to face time with the patient, communication of recommendations to the care team, and documentation time.    ECOG Performance  1    Diagnosis:    1.  Right upper lobe lung mass: Initially seen in 2019. Has not been biopsied.    Treatment:    Stereotactic radiosurgery: Right upper lobe nodule. 5400 cGy given in 3 fractions. Completed July 9, 2021.    SBRT delivered to a right lower lobe nodule. 5400 cGy in 3 fractions. October 6 through October 11, 2023.    Interim History:    Cosme returns today for follow-up visit.  She is feeling okay.  No new complaints since her last visit.  Denies chest pain or cough.  Stable dyspnea on exertion.    Review of Systems:    As above in the history.     Review of Systems otherwise Negative for:  General: chills, fever or night sweats  Psychological: anxiety or depression  Ophthalmic: blurry vision, double vision or loss of vision, vision change  ENT: epistaxis, oral lesions, hearing changes  Hematological and Lymphatic: bleeding, bruising, jaundice, swollen lymph nodes  Endocrine: hot flashes, unexpected weight changes  Respiratory: cough, hemoptysis, orthopnea  Cardiovascular: chest pain, edema, palpitations or PND  Gastrointestinal: abdominal pain, blood in stools, change in bowel habits, constipation,  "diarrhea or nausea/vomiting  Genito-Urinary: change in urinary stream, incontinence, frequency/urgency  Musculoskeletal: joint stiffness, swelling, muscle pain  Neurological: dizziness, headaches, numbness/tingling  Dermatological: lumps and rash    Past History:    Past Medical History:   Diagnosis Date    COPD (chronic obstructive pulmonary disease) (H)     Coronary artery disease due to lipid rich plaque 02/2021    Abnormal coronary CTA    Family history of myocardial infarction     mother under age 60's  CVA and MI    High cholesterol     Hypertension     PVC's (premature ventricular contractions)     Syncope 2012    Tubular adenoma of colon      Physical Exam:    BP (!) 144/86   Pulse 79   Resp 16   Ht 1.676 m (5' 6\")   Wt 70.8 kg (156 lb)   SpO2 97%   BMI 25.18 kg/m      General: patient appears stated age of 76 year old. Nontoxic and in no distress.   HEENT: Head: atraumatic, normocephalic.  Sclerae anicteric.  Chest:  Normal respiratory effort  Cardiac:  No edema.   Abdomen: abdomen is non-distended  Extremities: normal tone and muscle bulk.  Skin: no lesions or rash on visible skin. Warm and dry.   CNS: alert and oriented. Grossly non-focal.   Psychiatric: normal mood and affect.     Lab Results:    Recent Results (from the past 168 hour(s))   Creatinine POCT   Result Value Ref Range    Creatinine POCT 0.8 0.6 - 1.1 mg/dL    GFR, ESTIMATED POCT >60 >60 mL/min/1.73m2      Imaging:    CT Chest/Abdomen/Pelvis w Contrast    Result Date: 7/19/2024  EXAM: CT CHEST/ABDOMEN/PELVIS W CONTRAST LOCATION: [facility] DATE: 7/19/2024 INDICATION: Metachronous lung cancers treated with SBRT. Lesion in the right upper lobe treated July 2020 and lesion in the right lower lobe treated October 2023 COMPARISON: CT chest 1/19/2024 and older studies, PET CT 9/8/2023, CT AP 6/10/2022 TECHNIQUE: CT scan of the chest, abdomen, and pelvis was performed following injection of IV contrast. Multiplanar " reformats were obtained. Dose reduction techniques were used. CONTRAST: Isovue 370 90mL FINDINGS: LUNGS AND PLEURA: New bandlike consolidation has developed about the treated right lower lobe nodule with a few air bronchograms. This is greatest laterally where it measures almost a centimeter in short axis (coronal image 64). This obscures the site of  the small treated pulmonary nodule. No new suspicious lesions. Posttreatment fibrosis in the right upper lobe and punctate linear opacity in the left posterior costophrenic angle (axial image 275) are stable. Extensive emphysema. No effusions. MEDIASTINUM/AXILLAE: Thyroid is normal. No adenopathy. Aorta and branches are normal caliber. No pulmonary emboli. CORONARY ARTERY CALCIFICATION: Moderate. HEPATOBILIARY: Mild, fatty infiltration the liver. PANCREAS: Normal. SPLEEN: Normal. ADRENAL GLANDS: Normal. KIDNEYS/BLADDER: There are small simple cysts in both kidneys which need no follow-up. BOWEL: Redundant colon with a moderate stool burden. No ascites or peritoneal tumor nodules. Bowel is of normal caliber. LYMPH NODES: Normal. VASCULATURE: Moderate arterial calcifications with ectatic aorta. No. Vessels are PELVIC ORGANS: Tubal ligation clips. MUSCULOSKELETAL: No suspicious bone lesions. Moderate spondylitic changes at L2-3 and L4-5.     IMPRESSION: 1.  Patient's developed new bandlike consolidation surrounding the treated right lower lobe nodule since January exam, presumed post SBRT change. 2.  This obscures the small treated tumor. This can be monitored on continued follow-up. 3.  No new concerning lesions in the chest, abdomen or pelvis. 4.  Stable post treatment change in the right upper lobe. 5.  Other noncritical findings as noted above..       Signed by: Timoteo Barnhart MD

## 2024-07-29 ENCOUNTER — PATIENT OUTREACH (OUTPATIENT)
Dept: ONCOLOGY | Facility: HOSPITAL | Age: 79
End: 2024-07-29
Payer: MEDICARE

## 2024-07-29 DIAGNOSIS — C34.11 MALIGNANT NEOPLASM OF UPPER LOBE OF RIGHT LUNG (H): Primary | ICD-10-CM

## 2024-07-29 NOTE — PROGRESS NOTES
[facility]: Cancer Care                                                                                            Situation: Patient chart reviewed by care coordinator.    Background: Patient with a diagnosis of right-sided lung cancer.    Assessment: Was in the clinic on 7/22/2024 to see Dr. Barnhart.  After that visit, he reviewed the patient's imaging with Dr. Cruz, radiation oncologist who is in agreement that this area in the right lung looks inflammatory in nature.    Plan/Recommendations: It is recommended that the patient keep her follow-up for CT scan and Dr. Barnhart as scheduled.    Call placed to patient to give her this update.  She verbalized understanding.  She currently still has a lab appointment on 11/18 but she states that she will be seeing her primary in October and she will request at that office for them to draw the labs that Dr. Barnhart is ordered.  She may not need a lab appointment and she knows she can call and cancel that.    Signature:  Amelia Hernandez RN

## 2024-08-13 ENCOUNTER — OFFICE VISIT (OUTPATIENT)
Dept: PULMONOLOGY | Facility: CLINIC | Age: 79
End: 2024-08-13
Attending: NURSE PRACTITIONER
Payer: MEDICARE

## 2024-08-13 VITALS
WEIGHT: 156.8 LBS | DIASTOLIC BLOOD PRESSURE: 63 MMHG | HEART RATE: 87 BPM | SYSTOLIC BLOOD PRESSURE: 103 MMHG | BODY MASS INDEX: 25.31 KG/M2 | OXYGEN SATURATION: 97 %

## 2024-08-13 DIAGNOSIS — J44.9 CHRONIC OBSTRUCTIVE PULMONARY DISEASE, UNSPECIFIED COPD TYPE (H): Primary | ICD-10-CM

## 2024-08-13 DIAGNOSIS — R06.09 DYSPNEA ON EXERTION: ICD-10-CM

## 2024-08-13 PROCEDURE — 99214 OFFICE O/P EST MOD 30 MIN: CPT | Performed by: NURSE PRACTITIONER

## 2024-08-13 RX ORDER — FLUTICASONE FUROATE, UMECLIDINIUM BROMIDE AND VILANTEROL TRIFENATATE 100; 62.5; 25 UG/1; UG/1; UG/1
1 POWDER RESPIRATORY (INHALATION) DAILY
Qty: 180 EACH | Refills: 3 | Status: SHIPPED | OUTPATIENT
Start: 2024-08-13

## 2024-08-13 RX ORDER — ALBUTEROL SULFATE 90 UG/1
AEROSOL, METERED RESPIRATORY (INHALATION)
COMMUNITY
Start: 2024-05-01

## 2024-08-13 NOTE — PATIENT INSTRUCTIONS
It was a pleasure seeing you in clinic today. This is what we discussed:    We will see how things go on a lower dose of Trelegy. If you noticed increased symptoms let me know, we can go back to high dose.   Use your albuterol every 4 hours as needed for cough, shortness of breath, wheeze, or chest tightness.   Follow up 1 year   If you have worsening symptoms, questions, or need to speak with the nurse please call [phone].

## 2024-08-13 NOTE — PROGRESS NOTES
Outpatient Pulmonary Clinic Visit  August 13, 2024      Assessment and Plan:   Shantel Romero is a 79 year old female former smoker with history of COPD, emphysema, CAD, HLD, HTN, and lung cancer diagnosed in 2019 and again in 2023 s/p radiation presenting today for follow up.    #1. Dyspnea on exertion: minimal   #2. COPD GOLD B: no flares since last visit. PFTs show moderate-severe obstructive disease with a moderately reduced diffusion capacity.  #3. Emphysema  #4. Right upper lobe lung mass: completed SBRT in July 2021 for right upper lobe cancer. She sees Dr. Barnhart in oncology and radiation oncology. Completed radiation for the slowly evolving RLL nodule in 10/2023. Per rad onc note she is not interested in any further invasive procedures. Last CT in 07/2024 showed stability. Plans for 6 month follow up.     CONTINUE Trelegy 1 puff daily.  We will trial her on a lower dose as she has been doing well. Rinse and gargle after use.   CONTINUE Albuterol inhaler every 4 hours as needed for increased shortness of breath or chest tightness. Use the spacer with this to help get the medication into your lungs  Try and increase exercise as you are able.   Not interested in pulmonary rehab at this time.  UTD with COVID booster, and pneumonia vaccine. Recommend seasonal flu and RSV vaccine.     RTC 1 year     If her symptoms exacerbate: Prednisone 40 mg daily x 5 days.  If sputum amount and thickness increased add azithromycin Z-Jimi x 5 days.    Maura Turner, CNP  Pulmonary Medicine  [facility]   [phone]    ___________________________________________________________________    CC:   Chief Complaint   Patient presents with    Follow Up     6 month follow up:  Chronic obstructive pulmonary disease, unspecified COPD type (H)  Malignant neoplasm of upper lobe of right lung (H)- CT Chest/ A/ P 7/19/24          HPI:   She was last seen in clinic in 02/2024. Since that time has done well with no exacerbations or  breathing concerns.   Reports ongoing occasional LUKE that she believes to be partially related to back pain or anxiety.  Previously notes Clonazepam helps the most. Is not very active.    She had previously been having issues using an HFA inhaler and when demonstrating technique in clinic was found to be using this incorrectly likely was not getting any medication.   Denies cough, chest tightness, or wheeze.   PND uses Flonase with some relief. Denies sinus pressure or congestion.   Still has intermittent chest pain. Followed by cardiology, uses nitroglycerin with relief.     She was previously seen at [place] [facility] by Dr. Jay Gonzalez. She had PFTs done and was evaluated when she was initially diagnosed with lung cancer in 2019. Those records were not available.      Patient supplied answers from flow sheet for:  COPD Assessment Test (CAT)  2009 [employer]. All rights reserved.      8/8/2022     1:00 PM 10/10/2022    12:00 PM 4/7/2023     3:04 PM 10/13/2023    12:31 PM 2/10/2024     1:50 PM 8/9/2024    10:31 AM   COPD assessment test (CAT)   Cough 0 5 1 2 0 0   Phlegm 0 2 1 1 0 1   Chest tightness 3 5 4 3 0 0   Walk up hill 5 3 4 4 1 2   Limited activities 5 3 3 3 1 1   Leaving my home 0 0 1 0 0 0   Sleep 3 0 2 1 1 1   Energy 5 4 3 3 2 2   Total Score 21 22 19 17 5 7        ROS:   10 point ROS negative unless otherwise noted in HPI    Physical Exam:  /63 (BP Location: Left arm, Patient Position: Sitting, Cuff Size: Adult Regular)   Pulse 87   Wt 71.1 kg (156 lb 12.8 oz)   SpO2 97%   BMI 25.31 kg/m      Physical Exam  Constitutional:       General: She is not in acute distress.     Appearance: She is not ill-appearing or diaphoretic.   HENT:      Nose: Nose normal.   Cardiovascular:      Rate and Rhythm: Normal rate and regular rhythm.      Pulses: Normal pulses.      Heart sounds: Normal heart sounds.   Pulmonary:      Effort: Pulmonary effort is normal. No respiratory distress.      Breath sounds: Normal  breath sounds. No wheezing or rhonchi.   Musculoskeletal:      Right lower leg: No edema.      Left lower leg: No edema.   Skin:     General: Skin is warm and dry.   Neurological:      Mental Status: She is alert.   Psychiatric:         Behavior: Behavior normal.       PMH:  Patient Active Problem List    Diagnosis Date Noted    Malignant neoplasm of lower lobe of right lung (H) 2023     Priority: Medium    Abnormal electrocardiogram 2022     Priority: Medium    Malignant neoplasm of upper lobe of right lung (H) 2021     Priority: Medium    Mass of upper lobe of right lung 2021     Priority: Medium    Tobacco abuse 2021     Priority: Medium    Hypertension, unspecified type      Priority: Medium    Dyspnea on exertion      Priority: Medium    Coronary artery disease due to lipid rich plaque      Priority: Medium    Chest pain 2021     Priority: Medium    Osteopenia      Priority: Medium     Created by Conversion  Replacement Utility updated for latest IMO load        Premature Menopause      Priority: Medium     Created by Conversion           PSH:  Past Surgical History:   Procedure Laterality Date    APPENDECTOMY      SHOULDER SURGERY Right     TUBAL LIGATION         Family HX: family history includes Breast Cancer in her paternal aunt; Cerebrovascular Disease in her mother; Coronary Artery Disease in her mother; Lung Cancer in her father.    Social Hx:  Social History     Socioeconomic History    Marital status:      Spouse name: Not on file    Number of children: Not on file    Years of education: Not on file    Highest education level: Not on file   Occupational History    Not on file   Tobacco Use    Smoking status: Former     Current packs/day: 0.00     Types: Cigarettes     Quit date:      Years since quittin.6     Passive exposure: Past (Dad occasionally smoked a pipe)    Smokeless tobacco: Never   Vaping Use    Vaping status: Never Used   Substance and  Sexual Activity    Alcohol use: Not on file    Drug use: Not on file    Sexual activity: Not on file   Other Topics Concern    Parent/sibling w/ CABG, MI or angioplasty before 65F 55M? Not Asked   Social History Narrative    Not on file     Social Determinants of Health     Financial Resource Strain: Not on file   Food Insecurity: Not on file   Transportation Needs: Not on file   Physical Activity: Not on file   Stress: Not on file   Social Connections: Not on file   Interpersonal Safety: Not on file   Housing Stability: Not on file         Current Meds:  Current Outpatient Medications   Medication Sig Dispense Refill    acetaminophen (TYLENOL) 500 MG tablet Take 500-1,000 mg by mouth every 8 hours as needed       albuterol (PROAIR HFA/PROVENTIL HFA/VENTOLIN HFA) 108 (90 Base) MCG/ACT inhaler INHALE 1 TO 2 PUFFS BY MOUTH EVERY 4 HOURS AS NEEDED FOR SHORTNESS OF BREATH      aspirin (ASA) 81 MG EC tablet Take 81 mg by mouth daily      atorvastatin (LIPITOR) 40 MG tablet Take 40 mg by mouth At Bedtime      Cholecalciferol 250 MCG (45046 UT) TABS Take 10,000 Units by mouth daily      ezetimibe (ZETIA) 10 MG tablet Take 1 tablet (10 mg) by mouth daily 90 tablet 11    famotidine (PEPCID) 20 MG tablet Take 1 tablet (20 mg) by mouth 2 times daily 180 tablet 3    fluticasone (FLONASE) 50 MCG/ACT nasal spray Spray 2 sprays in nostril as needed      Fluticasone-Umeclidin-Vilanterol (TRELEGY ELLIPTA) 200-62.5-25 MCG/ACT oral inhaler Inhale 1 puff into the lungs daily 180 each 3    HEMP OIL OR EXTRACT OR OTHER CBD CANNABINOID, NOT MEDICAL CANNABIS, 1 gummy at HS for sleep-delta 9      hydrOXYzine HCl (ATARAX) 25 MG tablet TAKE 1 TABLET BY MOUTH EVERY 6 HOURS AS NEEDED FOR ANXIETY OR STRESS      IBANdronate (BONIVA) 150 MG tablet Take 150 mg by mouth every 30 days      lisinopril (ZESTRIL) 5 MG tablet Take 2.5 mg by mouth daily      melatonin 3 MG CAPS Take 1 capsule by mouth At Bedtime      Multiple Vitamin (MULTIVITAMIN ADULT  PO) Take 1 tablet by mouth daily      nicotine polacrilex (NICORETTE) 4 MG gum Take 4 mg by mouth every hour as needed      nitroGLYcerin (NITRODUR) 0.2 MG/HR 24 hr patch Place 1 patch onto the skin daily (Patient not taking: Reported on 7/22/2024) 90 patch 11    nitroGLYcerin (NITROSTAT) 0.4 MG sublingual tablet For chest pain place 1 tablet under the tongue every 5 minutes for 3 doses. If symptoms persist 5 minutes after 1st dose call 911. (Patient not taking: Reported on 7/22/2024) 25 tablet 4     Allergies:  No Known Allergies    Labs:   personally reviewed in the EMR.    Imaging studies: personally reviewed and interpreted. Below are the Radiology interpretations.      PFT's (7/8/2022): abnormal  FEV1/FVC is 60% and is reduced.  FEV1 is 1.13L (53%) predicted and is reduced  FVC is 1.88L (67%) predicted and is reduced  There was improvement in spirometry after a single inhaled dose of bronchodilator.  TLC is 4.77L (90%) predicted and is normal.  RV is 2.89L (126%) predicted and is normal.  DLCO is10.57ml/min/hg (52%) predicted and is reduced when it is corrected for hemoglobin.  Flow volume loops indicate scooping.    Impression:  Full Pulmonary Function Test is abnormal.  PFTs are consistent with moderate-severe obstructive disease.  Spirometry is consistent with reversibility.  There is no hyperinflation.  There is no air-trapping.  Diffusion capacity when corrected for hemoglobin is moderately reduced.            CT CHEST/ABDOMEN/PELVIS W CONTRAST, DATE: 7/19/2024  INDICATION: Metachronous lung cancers treated with SBRT. Lesion in the right upper lobe treated July 2020 and lesion in the right lower lobe treated October 2023  COMPARISON: CT chest 1/19/2024 and older studies, PET CT 9/8/2023, CT AP 6/10/2022  FINDINGS:   LUNGS AND PLEURA: New bandlike consolidation has developed about the treated right lower lobe nodule with a few air bronchograms. This is greatest laterally where it measures almost a  centimeter in short axis (coronal image 64). This obscures the site of   the small treated pulmonary nodule. No new suspicious lesions. Posttreatment fibrosis in the right upper lobe and punctate linear opacity in the left posterior costophrenic angle (axial image 275) are stable. Extensive emphysema. No effusions.  MEDIASTINUM/AXILLAE: Thyroid is normal. No adenopathy. Aorta and branches are normal caliber. No pulmonary emboli.                                                         IMPRESSION:  1.  Patient's developed new bandlike consolidation surrounding the treated right lower lobe nodule since January exam, presumed post SBRT change.  2.  This obscures the small treated tumor. This can be monitored on continued follow-up.  3.  No new concerning lesions in the chest, abdomen or pelvis.  4.  Stable post treatment change in the right upper lobe.  5.  Other noncritical findings as noted above..    PET ONCOLOGY WHOLE BODY, DATE: 9/8/2023  INDICATION: Subsequent treatment planning and restaging for malignant neoplasm of upper lobe, right bronchus or lung. Status post radiation therapy completed in July 2021. Monitor treatment response.  COMPARISON: Thoracic CT dated 07/21/2023, 02/17/2023, 10/21/2022, and 02/11/2022. FDG PET/CT dated 02/04/2021  PET/CT FINDINGS: FDG avid nodule in the lateral right upper lobe measuring 1.0 x 1.0 cm (max SUV 3.2, previously 2.1) with surrounding FDG avid airspace opacity (max SUV 1.9).  Spiculated FDG avid nodule in the posterior right lower lobe measuring 0.9 x 0.7 cm (max SUV 2.8, previously measured 0.6 x 0.5 cm on 10/21/2022) suspicious for early primary lung adenocarcinoma without metastasis.  Diffuse esophagitis. Shoulder, hip, and knee synovitis. Left upper extremity injection related artifact. Development of a groundglass opacity in the more anterior right lower lobe (max SUV 4.2) and is favored to be inflammatory in nature  CT FINDINGS: Postoperative change of the bilateral  lenses. Mild carotid artery bifurcation calcification. Mild to moderate coronary artery calcium. Right renal cyst. Sigmoid diverticulosis. Multilevel degenerative changes of the mildly scoliotic spine.   The lower extremities are unremarkable.                                                                IMPRESSION:  1. FDG avid nodule in the lateral right upper lobe with surrounding FDG avid airspace opacity. While this may represent evolving posttreatment change, the paradoxical increase in metabolic activity of the right upper lobe nodule is somewhat atypical and warrants continued imaging surveillance as this could represent early disease recurrence.  2. Findings suspicious for a synchronous early right lower lobe primary lung adenocarcinoma without metastasis.    Chest CT 6/10/22:  IMPRESSION:  1.  Stable 14 x 10 mm nodule right upper lobe since 02/11/2022.  2.  New mild radiation pneumonitis around this nodule.  3.  No significant new findings.    Echo 1/30/2021:  Narrative & Impression    No previous study for comparison.    Normal left ventricular size. Mild left ventricular hypertrophy    Left ventricle ejection fraction is normal. The calculated left ventricular ejection fraction is 67%.    Possible mild right ventricular enlargement.    Normal right ventricular systolic function.    Mild right atrial enlargement.    Aortic valve sclerosis without evidence for significant aortic stenosis. Mild aortic insufficiency.    Trace pericardial effusion suggested

## 2024-10-28 ENCOUNTER — LAB REQUISITION (OUTPATIENT)
Dept: LAB | Facility: CLINIC | Age: 79
End: 2024-10-28
Payer: MEDICARE

## 2024-10-28 ENCOUNTER — TRANSFERRED RECORDS (OUTPATIENT)
Dept: HEALTH INFORMATION MANAGEMENT | Facility: CLINIC | Age: 79
End: 2024-10-28

## 2024-10-28 DIAGNOSIS — E78.00 PURE HYPERCHOLESTEROLEMIA, UNSPECIFIED: ICD-10-CM

## 2024-10-28 DIAGNOSIS — I10 ESSENTIAL (PRIMARY) HYPERTENSION: ICD-10-CM

## 2024-10-28 PROCEDURE — 80061 LIPID PANEL: CPT | Mod: ORL | Performed by: FAMILY MEDICINE

## 2024-10-28 PROCEDURE — 80053 COMPREHEN METABOLIC PANEL: CPT | Mod: ORL | Performed by: FAMILY MEDICINE

## 2024-10-29 LAB
ALBUMIN SERPL BCG-MCNC: 4.3 G/DL (ref 3.5–5.2)
ALP SERPL-CCNC: 60 U/L (ref 40–150)
ALT SERPL W P-5'-P-CCNC: 30 U/L (ref 0–50)
ANION GAP SERPL CALCULATED.3IONS-SCNC: 13 MMOL/L (ref 7–15)
AST SERPL W P-5'-P-CCNC: 27 U/L (ref 0–45)
BILIRUB SERPL-MCNC: 0.5 MG/DL
BUN SERPL-MCNC: 14.3 MG/DL (ref 8–23)
CALCIUM SERPL-MCNC: 9.6 MG/DL (ref 8.8–10.4)
CHLORIDE SERPL-SCNC: 102 MMOL/L (ref 98–107)
CHOLEST SERPL-MCNC: 171 MG/DL
CREAT SERPL-MCNC: 0.74 MG/DL (ref 0.51–0.95)
EGFRCR SERPLBLD CKD-EPI 2021: 81 ML/MIN/1.73M2
FASTING STATUS PATIENT QL REPORTED: NORMAL
FASTING STATUS PATIENT QL REPORTED: NORMAL
GLUCOSE SERPL-MCNC: 97 MG/DL (ref 70–99)
HCO3 SERPL-SCNC: 23 MMOL/L (ref 22–29)
HDLC SERPL-MCNC: 95 MG/DL
LDLC SERPL CALC-MCNC: 63 MG/DL
NONHDLC SERPL-MCNC: 76 MG/DL
POTASSIUM SERPL-SCNC: 4.3 MMOL/L (ref 3.4–5.3)
PROT SERPL-MCNC: 7 G/DL (ref 6.4–8.3)
SODIUM SERPL-SCNC: 138 MMOL/L (ref 135–145)
TRIGL SERPL-MCNC: 64 MG/DL

## 2024-11-12 ENCOUNTER — TELEPHONE (OUTPATIENT)
Dept: ONCOLOGY | Facility: HOSPITAL | Age: 79
End: 2024-11-12
Payer: MEDICARE

## 2024-11-12 NOTE — TELEPHONE ENCOUNTER
Cosme calls regarding lab appointment scheduled on 11/18. She reports that she does not need lab appointment because labs were drawn with her primary care provider. Cosme is advised that a CMP was received but a CBC is not seen.     Message will be sent to care team to further verify if lab appointment is needed.    Deborah Rm RN

## 2024-11-14 ENCOUNTER — PATIENT OUTREACH (OUTPATIENT)
Dept: ONCOLOGY | Facility: HOSPITAL | Age: 79
End: 2024-11-14
Payer: MEDICARE

## 2024-11-14 NOTE — TELEPHONE ENCOUNTER
Message left for patient by Rachelle Leavitt RN care coordinator. Patient does need lab work with upcoming visit.    Deborah Rm RN

## 2024-11-14 NOTE — PROGRESS NOTES
[facility]: Cancer Care                                                                                            Called patient and left a detailed voice message stating that per the last visit with Dr. Barnhart, he ordered a CMP and a CBC and so the patient will need a CBC drawn when she comes in for her follow-up appointment on 11/18.  Provided our clinic callback number.    Signature:  Rachelle Leavitt RN

## 2024-11-15 ENCOUNTER — HOSPITAL ENCOUNTER (OUTPATIENT)
Dept: CT IMAGING | Facility: CLINIC | Age: 79
Discharge: HOME | End: 2024-11-15
Attending: INTERNAL MEDICINE | Admitting: INTERNAL MEDICINE
Payer: MEDICARE

## 2024-11-15 DIAGNOSIS — C34.11 MALIGNANT NEOPLASM OF UPPER LOBE OF RIGHT LUNG (H): ICD-10-CM

## 2024-11-15 LAB
CREAT BLD-MCNC: 0.9 MG/DL (ref 0.6–1.1)
EGFRCR SERPLBLD CKD-EPI 2021: >60 ML/MIN/1.73M2

## 2024-11-15 PROCEDURE — 250N000011 HC RX IP 250 OP 636: Performed by: INTERNAL MEDICINE

## 2024-11-15 PROCEDURE — G1010 CDSM STANSON: HCPCS

## 2024-11-15 PROCEDURE — 71260 CT THORAX DX C+: CPT | Mod: MG

## 2024-11-15 PROCEDURE — 82565 ASSAY OF CREATININE: CPT

## 2024-11-15 RX ORDER — IOPAMIDOL 755 MG/ML
90 INJECTION, SOLUTION INTRAVASCULAR ONCE
Status: COMPLETED | OUTPATIENT
Start: 2024-11-15 | End: 2024-11-15

## 2024-11-15 RX ADMIN — IOPAMIDOL 90 ML: 755 INJECTION, SOLUTION INTRAVENOUS at 12:51

## 2024-11-18 ENCOUNTER — LAB (OUTPATIENT)
Dept: INFUSION THERAPY | Facility: HOSPITAL | Age: 79
End: 2024-11-18
Attending: INTERNAL MEDICINE
Payer: MEDICARE

## 2024-11-18 ENCOUNTER — ONCOLOGY VISIT (OUTPATIENT)
Dept: ONCOLOGY | Facility: HOSPITAL | Age: 79
End: 2024-11-18
Attending: INTERNAL MEDICINE
Payer: MEDICARE

## 2024-11-18 VITALS
OXYGEN SATURATION: 97 % | BODY MASS INDEX: 25.7 KG/M2 | TEMPERATURE: 97.7 F | DIASTOLIC BLOOD PRESSURE: 93 MMHG | HEART RATE: 83 BPM | SYSTOLIC BLOOD PRESSURE: 144 MMHG | WEIGHT: 159.2 LBS

## 2024-11-18 DIAGNOSIS — C34.31 MALIGNANT NEOPLASM OF LOWER LOBE OF RIGHT LUNG (H): Primary | ICD-10-CM

## 2024-11-18 DIAGNOSIS — C34.11 MALIGNANT NEOPLASM OF UPPER LOBE OF RIGHT LUNG (H): ICD-10-CM

## 2024-11-18 LAB
ALBUMIN SERPL BCG-MCNC: 4.2 G/DL (ref 3.5–5.2)
ALP SERPL-CCNC: 59 U/L (ref 40–150)
ALT SERPL W P-5'-P-CCNC: 26 U/L (ref 0–50)
ANION GAP SERPL CALCULATED.3IONS-SCNC: 13 MMOL/L (ref 7–15)
AST SERPL W P-5'-P-CCNC: 30 U/L (ref 0–45)
BASOPHILS # BLD AUTO: 0 10E3/UL (ref 0–0.2)
BASOPHILS NFR BLD AUTO: 1 %
BILIRUB SERPL-MCNC: 0.6 MG/DL
BUN SERPL-MCNC: 15.7 MG/DL (ref 8–23)
CALCIUM SERPL-MCNC: 9.8 MG/DL (ref 8.8–10.4)
CHLORIDE SERPL-SCNC: 101 MMOL/L (ref 98–107)
CREAT SERPL-MCNC: 0.72 MG/DL (ref 0.51–0.95)
EGFRCR SERPLBLD CKD-EPI 2021: 84 ML/MIN/1.73M2
EOSINOPHIL # BLD AUTO: 0.1 10E3/UL (ref 0–0.7)
EOSINOPHIL NFR BLD AUTO: 1 %
ERYTHROCYTE [DISTWIDTH] IN BLOOD BY AUTOMATED COUNT: 13.3 % (ref 10–15)
GLUCOSE SERPL-MCNC: 102 MG/DL (ref 70–99)
HCO3 SERPL-SCNC: 24 MMOL/L (ref 22–29)
HCT VFR BLD AUTO: 42.3 % (ref 35–47)
HGB BLD-MCNC: 13.9 G/DL (ref 11.7–15.7)
IMM GRANULOCYTES # BLD: 0 10E3/UL
IMM GRANULOCYTES NFR BLD: 0 %
LYMPHOCYTES # BLD AUTO: 1.3 10E3/UL (ref 0.8–5.3)
LYMPHOCYTES NFR BLD AUTO: 20 %
MCH RBC QN AUTO: 33.7 PG (ref 26.5–33)
MCHC RBC AUTO-ENTMCNC: 32.9 G/DL (ref 31.5–36.5)
MCV RBC AUTO: 102 FL (ref 78–100)
MONOCYTES # BLD AUTO: 0.6 10E3/UL (ref 0–1.3)
MONOCYTES NFR BLD AUTO: 9 %
NEUTROPHILS # BLD AUTO: 4.5 10E3/UL (ref 1.6–8.3)
NEUTROPHILS NFR BLD AUTO: 70 %
NRBC # BLD AUTO: 0 10E3/UL
NRBC BLD AUTO-RTO: 0 /100
PLATELET # BLD AUTO: 234 10E3/UL (ref 150–450)
POTASSIUM SERPL-SCNC: 4.3 MMOL/L (ref 3.4–5.3)
PROT SERPL-MCNC: 6.8 G/DL (ref 6.4–8.3)
RBC # BLD AUTO: 4.13 10E6/UL (ref 3.8–5.2)
SODIUM SERPL-SCNC: 138 MMOL/L (ref 135–145)
WBC # BLD AUTO: 6.4 10E3/UL (ref 4–11)

## 2024-11-18 PROCEDURE — 82435 ASSAY OF BLOOD CHLORIDE: CPT

## 2024-11-18 PROCEDURE — 84155 ASSAY OF PROTEIN SERUM: CPT

## 2024-11-18 PROCEDURE — 36415 COLL VENOUS BLD VENIPUNCTURE: CPT

## 2024-11-18 PROCEDURE — G0463 HOSPITAL OUTPT CLINIC VISIT: HCPCS | Performed by: INTERNAL MEDICINE

## 2024-11-18 PROCEDURE — 99214 OFFICE O/P EST MOD 30 MIN: CPT | Performed by: INTERNAL MEDICINE

## 2024-11-18 PROCEDURE — 85004 AUTOMATED DIFF WBC COUNT: CPT

## 2024-11-18 PROCEDURE — 85014 HEMATOCRIT: CPT

## 2024-11-18 PROCEDURE — G2211 COMPLEX E/M VISIT ADD ON: HCPCS | Performed by: INTERNAL MEDICINE

## 2024-11-18 ASSESSMENT — PAIN SCALES - GENERAL: PAINLEVEL_OUTOF10: WORST PAIN (10)

## 2024-11-18 NOTE — LETTER
"11/18/2024      Shantel Romero  [address]  [place] [address]      Dear Colleague,    Thank you for referring your patient, Shantel Romero, to the [facility]. Please see a copy of my visit note below.    Oncology Rooming Note    November 18, 2024 1:43 PM   Shantel Romero is a 79 year old female who presents for:    Chief Complaint   Patient presents with     Oncology Clinic Visit     Malignant neoplasm of upper lobe of right lung     Initial Vitals: BP (!) 144/93 (BP Location: Left arm, Patient Position: Sitting, Cuff Size: Adult Regular)   Pulse 83   Temp 97.7  F (36.5  C) (Oral)   Wt 72.2 kg (159 lb 3.2 oz)   SpO2 97%   BMI 25.70 kg/m   Estimated body mass index is 25.7 kg/m  as calculated from the following:    Height as of 7/22/24: 1.676 m (5' 6\").    Weight as of this encounter: 72.2 kg (159 lb 3.2 oz). Body surface area is 1.83 meters squared.  Worst Pain (10) Comment: Data Unavailable   No LMP recorded. Patient is postmenopausal.  Allergies reviewed: Yes  Medications reviewed: Yes    Medications: Medication refills not needed today.  Pharmacy name entered into [facility]: [id number] - [place] [id number] AT [facility] [address]    Frailty Screening:   Is the patient here for a new oncology consult visit in cancer care? 2. No      Clinical concerns: None      Yi Cagle MA              [facility] Hematology and Oncology Progress Note    Patient: Shantel Romero  MRN: [id number]  Date of Service: Nov 18, 2024        Assessment and Plan:    1.  Right-sided lung cancer: She recently had a CT scan.  I personally viewed the images and shared them with Cosme.  Overall things look stable when compared to July.  No evidence of progressive disease.  Therefore, we will see her back in 6 months with a PET scan.  She will let us know in the interim if she develops any new symptoms.    Data review:  CBC from today was " reviewed and shows a white count of 6.4, hemoglobin 13.9, mean cell volume 102 and platelets 234,000.    Medical decision Making:  I spent 30 minutes in the care of this patient today, which included time necessary for preparation for the visit, face to face time with the patient, communication of recommendations to the care team, and documentation time.    ECOG Performance  1    Diagnosis:    1.  Right upper lobe lung mass: Initially seen in 2019. Has not been biopsied.    Treatment:    Stereotactic radiosurgery: Right upper lobe nodule. 5400 cGy given in 3 fractions. Completed July 9, 2021.    SBRT delivered to a right lower lobe nodule. 5400 cGy in 3 fractions. October 6 through October 11, 2023.    Interim History:    Jan returns today for follow-up visit.  She is last seen about 4 months ago.  She is here to review her CT scan.  Overall she has been feeling okay.  No chest pain or shortness of breath.  Energy and appetite are okay.  No acute complaints today.    Review of Systems:    As above in the history.     Review of Systems otherwise Negative for:  General: chills, fever or night sweats  Psychological: anxiety or depression  Ophthalmic: blurry vision, double vision or loss of vision, vision change  ENT: epistaxis, oral lesions, hearing changes  Hematological and Lymphatic: bleeding, bruising, jaundice, swollen lymph nodes  Endocrine: hot flashes, unexpected weight changes  Respiratory: cough, hemoptysis, orthopnea  Cardiovascular: chest pain, edema, palpitations or PND  Gastrointestinal: abdominal pain, blood in stools, change in bowel habits, constipation, diarrhea or nausea/vomiting  Genito-Urinary: change in urinary stream, incontinence, frequency/urgency  Musculoskeletal: joint stiffness, swelling, muscle pain  Neurological: dizziness, headaches, numbness/tingling  Dermatological: lumps and rash    Past History:    Past Medical History:   Diagnosis Date     COPD (chronic obstructive pulmonary disease)  (H)      Coronary artery disease due to lipid rich plaque 02/2021    Abnormal coronary CTA     Family history of myocardial infarction     mother under age 60's  CVA and MI     High cholesterol      Hypertension      PVC's (premature ventricular contractions)      Syncope 2012     Tubular adenoma of colon      Physical Exam:    BP (!) 144/93 (BP Location: Left arm, Patient Position: Sitting, Cuff Size: Adult Regular)   Pulse 83   Temp 97.7  F (36.5  C) (Oral)   Wt 72.2 kg (159 lb 3.2 oz)   SpO2 97%   BMI 25.70 kg/m      General: patient appears stated age of 76 year old. Nontoxic and in no distress.   HEENT: Head: atraumatic, normocephalic.  Sclerae anicteric.  Chest:  Normal respiratory effort  Cardiac:  No edema.   Abdomen: abdomen is non-distended  Extremities: normal tone and muscle bulk.  Skin: no lesions or rash on visible skin. Warm and dry.   CNS: alert and oriented. Grossly non-focal.   Psychiatric: normal mood and affect.     Lab Results:    Recent Results (from the past week)   Creatinine POCT   Result Value Ref Range    Creatinine POCT 0.9 0.6 - 1.1 mg/dL    GFR, ESTIMATED POCT >60 >60 mL/min/1.73m2   Comprehensive metabolic panel (BMP + Alb, Alk Phos, ALT, AST, Total. Bili, TP)   Result Value Ref Range    Sodium 138 135 - 145 mmol/L    Potassium 4.3 3.4 - 5.3 mmol/L    Carbon Dioxide (CO2) 24 22 - 29 mmol/L    Anion Gap 13 7 - 15 mmol/L    Urea Nitrogen 15.7 8.0 - 23.0 mg/dL    Creatinine 0.72 0.51 - 0.95 mg/dL    GFR Estimate 84 >60 mL/min/1.73m2    Calcium 9.8 8.8 - 10.4 mg/dL    Chloride 101 98 - 107 mmol/L    Glucose 102 (H) 70 - 99 mg/dL    Alkaline Phosphatase 59 40 - 150 U/L    AST 30 0 - 45 U/L    ALT 26 0 - 50 U/L    Protein Total 6.8 6.4 - 8.3 g/dL    Albumin 4.2 3.5 - 5.2 g/dL    Bilirubin Total 0.6 <=1.2 mg/dL   CBC with platelets and differential   Result Value Ref Range    WBC Count 6.4 4.0 - 11.0 10e3/uL    RBC Count 4.13 3.80 - 5.20 10e6/uL    Hemoglobin 13.9 11.7 - 15.7 g/dL     Hematocrit 42.3 35.0 - 47.0 %     (H) 78 - 100 fL    MCH 33.7 (H) 26.5 - 33.0 pg    MCHC 32.9 31.5 - 36.5 g/dL    RDW 13.3 10.0 - 15.0 %    Platelet Count 234 150 - 450 10e3/uL    % Neutrophils 70 %    % Lymphocytes 20 %    % Monocytes 9 %    % Eosinophils 1 %    % Basophils 1 %    % Immature Granulocytes 0 %    NRBCs per 100 WBC 0 <1 /100    Absolute Neutrophils 4.5 1.6 - 8.3 10e3/uL    Absolute Lymphocytes 1.3 0.8 - 5.3 10e3/uL    Absolute Monocytes 0.6 0.0 - 1.3 10e3/uL    Absolute Eosinophils 0.1 0.0 - 0.7 10e3/uL    Absolute Basophils 0.0 0.0 - 0.2 10e3/uL    Absolute Immature Granulocytes 0.0 <=0.4 10e3/uL    Absolute NRBCs 0.0 10e3/uL      Imaging:    CT Chest w Contrast    Result Date: 11/18/2024  EXAM: CT CHEST W CONTRAST LOCATION: [facility] DATE: 11/15/2024 INDICATION: Right-sided metachronous lung cancers. Post SBRT right lower lobe October 2023 and right upper lobe July 2020. COMPARISON: CT CAP 7/19/2024 and older studies, CT chest 1/19/2024 and older studies, PET CT 9/8/2023 TECHNIQUE: CT chest with IV contrast. Multiplanar reformats were obtained. Dose reduction techniques were used. CONTRAST: Isovue 370 90ml FINDINGS: LUNGS AND PLEURA: Evolving bandlike opacities in the right lower lobe with some contraction and decrease in the peripheral consolidation about the radiated tiny tumor. Tumor is not visible amidst the changes. Stable appearance to the treated tumor bed in  the right upper lobe. Marked emphysema. No new pulmonary nodules or effusions. MEDIASTINUM/AXILLAE: No concerning adenopathy. Aorta and branches are stable. Thyroid is unremarkable. No central pulmonary emboli. CORONARY ARTERY CALCIFICATION: Severe. UPPER ABDOMEN: Right renal cyst again noted. No concerning adrenal and liver lesions. MUSCULOSKELETAL: No concerning bone lesions.     IMPRESSION: 1.  Evolving postradiation changes about the treated right lower lobe tumor. 2.  Stable post treatment changes  in the right upper lobe. 3.  No, new concerning lesions.       Signed by: Timoteo Barnhart MD      Again, thank you for allowing me to participate in the care of your patient.        Sincerely,        Timoteo Barnhart MD

## 2024-11-18 NOTE — PROGRESS NOTES
"Oncology Rooming Note    November 18, 2024 1:43 PM   Shantel Romero is a 79 year old female who presents for:    Chief Complaint   Patient presents with    Oncology Clinic Visit     Malignant neoplasm of upper lobe of right lung     Initial Vitals: BP (!) 144/93 (BP Location: Left arm, Patient Position: Sitting, Cuff Size: Adult Regular)   Pulse 83   Temp 97.7  F (36.5  C) (Oral)   Wt 72.2 kg (159 lb 3.2 oz)   SpO2 97%   BMI 25.70 kg/m   Estimated body mass index is 25.7 kg/m  as calculated from the following:    Height as of 7/22/24: 1.676 m (5' 6\").    Weight as of this encounter: 72.2 kg (159 lb 3.2 oz). Body surface area is 1.83 meters squared.  Worst Pain (10) Comment: Data Unavailable   No LMP recorded. Patient is postmenopausal.  Allergies reviewed: Yes  Medications reviewed: Yes    Medications: Medication refills not needed today.  Pharmacy name entered into [employer]: [id number] - [place], [place] - [address] AT [facility] [address]    Frailty Screening:   Is the patient here for a new oncology consult visit in cancer care? 2. No      Clinical concerns: None      Yi Cagle MA            "

## 2024-11-18 NOTE — PROGRESS NOTES
[facility] Hematology and Oncology Progress Note    Patient: Shantel Romero  MRN: [id number]  Date of Service: Nov 18, 2024        Assessment and Plan:    1.  Right-sided lung cancer: She recently had a CT scan.  I personally viewed the images and shared them with Cosme.  Overall things look stable when compared to July.  No evidence of progressive disease.  Therefore, we will see her back in 6 months with a PET scan.  She will let us know in the interim if she develops any new symptoms.    Data review:  CBC from today was reviewed and shows a white count of 6.4, hemoglobin 13.9, mean cell volume 102 and platelets 234,000.    Medical decision Making:  I spent 30 minutes in the care of this patient today, which included time necessary for preparation for the visit, face to face time with the patient, communication of recommendations to the care team, and documentation time.    ECOG Performance  1    Diagnosis:    1.  Right upper lobe lung mass: Initially seen in 2019. Has not been biopsied.    Treatment:    Stereotactic radiosurgery: Right upper lobe nodule. 5400 cGy given in 3 fractions. Completed July 9, 2021.    SBRT delivered to a right lower lobe nodule. 5400 cGy in 3 fractions. October 6 through October 11, 2023.    Interim History:    Cosme returns today for follow-up visit.  She is last seen about 4 months ago.  She is here to review her CT scan.  Overall she has been feeling okay.  No chest pain or shortness of breath.  Energy and appetite are okay.  No acute complaints today.    Review of Systems:    As above in the history.     Review of Systems otherwise Negative for:  General: chills, fever or night sweats  Psychological: anxiety or depression  Ophthalmic: blurry vision, double vision or loss of vision, vision change  ENT: epistaxis, oral lesions, hearing changes  Hematological and Lymphatic: bleeding, bruising, jaundice, swollen lymph nodes  Endocrine: hot flashes, unexpected weight  changes  Respiratory: cough, hemoptysis, orthopnea  Cardiovascular: chest pain, edema, palpitations or PND  Gastrointestinal: abdominal pain, blood in stools, change in bowel habits, constipation, diarrhea or nausea/vomiting  Genito-Urinary: change in urinary stream, incontinence, frequency/urgency  Musculoskeletal: joint stiffness, swelling, muscle pain  Neurological: dizziness, headaches, numbness/tingling  Dermatological: lumps and rash    Past History:    Past Medical History:   Diagnosis Date    COPD (chronic obstructive pulmonary disease) (H)     Coronary artery disease due to lipid rich plaque 02/2021    Abnormal coronary CTA    Family history of myocardial infarction     mother under age 60's  CVA and MI    High cholesterol     Hypertension     PVC's (premature ventricular contractions)     Syncope 2012    Tubular adenoma of colon      Physical Exam:    BP (!) 144/93 (BP Location: Left arm, Patient Position: Sitting, Cuff Size: Adult Regular)   Pulse 83   Temp 97.7  F (36.5  C) (Oral)   Wt 72.2 kg (159 lb 3.2 oz)   SpO2 97%   BMI 25.70 kg/m      General: patient appears stated age of 76 year old. Nontoxic and in no distress.   HEENT: Head: atraumatic, normocephalic.  Sclerae anicteric.  Chest:  Normal respiratory effort  Cardiac:  No edema.   Abdomen: abdomen is non-distended  Extremities: normal tone and muscle bulk.  Skin: no lesions or rash on visible skin. Warm and dry.   CNS: alert and oriented. Grossly non-focal.   Psychiatric: normal mood and affect.     Lab Results:    Recent Results (from the past week)   Creatinine POCT   Result Value Ref Range    Creatinine POCT 0.9 0.6 - 1.1 mg/dL    GFR, ESTIMATED POCT >60 >60 mL/min/1.73m2   Comprehensive metabolic panel (BMP + Alb, Alk Phos, ALT, AST, Total. Bili, TP)   Result Value Ref Range    Sodium 138 135 - 145 mmol/L    Potassium 4.3 3.4 - 5.3 mmol/L    Carbon Dioxide (CO2) 24 22 - 29 mmol/L    Anion Gap 13 7 - 15 mmol/L    Urea Nitrogen 15.7 8.0 -  23.0 mg/dL    Creatinine 0.72 0.51 - 0.95 mg/dL    GFR Estimate 84 >60 mL/min/1.73m2    Calcium 9.8 8.8 - 10.4 mg/dL    Chloride 101 98 - 107 mmol/L    Glucose 102 (H) 70 - 99 mg/dL    Alkaline Phosphatase 59 40 - 150 U/L    AST 30 0 - 45 U/L    ALT 26 0 - 50 U/L    Protein Total 6.8 6.4 - 8.3 g/dL    Albumin 4.2 3.5 - 5.2 g/dL    Bilirubin Total 0.6 <=1.2 mg/dL   CBC with platelets and differential   Result Value Ref Range    WBC Count 6.4 4.0 - 11.0 10e3/uL    RBC Count 4.13 3.80 - 5.20 10e6/uL    Hemoglobin 13.9 11.7 - 15.7 g/dL    Hematocrit 42.3 35.0 - 47.0 %     (H) 78 - 100 fL    MCH 33.7 (H) 26.5 - 33.0 pg    MCHC 32.9 31.5 - 36.5 g/dL    RDW 13.3 10.0 - 15.0 %    Platelet Count 234 150 - 450 10e3/uL    % Neutrophils 70 %    % Lymphocytes 20 %    % Monocytes 9 %    % Eosinophils 1 %    % Basophils 1 %    % Immature Granulocytes 0 %    NRBCs per 100 WBC 0 <1 /100    Absolute Neutrophils 4.5 1.6 - 8.3 10e3/uL    Absolute Lymphocytes 1.3 0.8 - 5.3 10e3/uL    Absolute Monocytes 0.6 0.0 - 1.3 10e3/uL    Absolute Eosinophils 0.1 0.0 - 0.7 10e3/uL    Absolute Basophils 0.0 0.0 - 0.2 10e3/uL    Absolute Immature Granulocytes 0.0 <=0.4 10e3/uL    Absolute NRBCs 0.0 10e3/uL      Imaging:    CT Chest w Contrast    Result Date: 11/18/2024  EXAM: CT CHEST W CONTRAST LOCATION: [facility] DATE: 11/15/2024 INDICATION: Right-sided metachronous lung cancers. Post SBRT right lower lobe October 2023 and right upper lobe July 2020. COMPARISON: CT CAP 7/19/2024 and older studies, CT chest 1/19/2024 and older studies, PET CT 9/8/2023 TECHNIQUE: CT chest with IV contrast. Multiplanar reformats were obtained. Dose reduction techniques were used. CONTRAST: Isovue 370 90ml FINDINGS: LUNGS AND PLEURA: Evolving bandlike opacities in the right lower lobe with some contraction and decrease in the peripheral consolidation about the radiated tiny tumor. Tumor is not visible amidst the changes. Stable  appearance to the treated tumor bed in  the right upper lobe. Marked emphysema. No new pulmonary nodules or effusions. MEDIASTINUM/AXILLAE: No concerning adenopathy. Aorta and branches are stable. Thyroid is unremarkable. No central pulmonary emboli. CORONARY ARTERY CALCIFICATION: Severe. UPPER ABDOMEN: Right renal cyst again noted. No concerning adrenal and liver lesions. MUSCULOSKELETAL: No concerning bone lesions.     IMPRESSION: 1.  Evolving postradiation changes about the treated right lower lobe tumor. 2.  Stable post treatment changes in the right upper lobe. 3.  No, new concerning lesions.       Signed by: Timoteo Barnhart MD

## 2024-12-08 ENCOUNTER — HEALTH MAINTENANCE LETTER (OUTPATIENT)
Age: 79
End: 2024-12-08

## 2025-03-10 ENCOUNTER — OFFICE VISIT (OUTPATIENT)
Dept: CARDIOLOGY | Facility: CLINIC | Age: 80
End: 2025-03-10
Payer: MEDICARE

## 2025-03-10 VITALS
BODY MASS INDEX: 25.66 KG/M2 | HEART RATE: 81 BPM | OXYGEN SATURATION: 97 % | WEIGHT: 159 LBS | SYSTOLIC BLOOD PRESSURE: 146 MMHG | DIASTOLIC BLOOD PRESSURE: 79 MMHG

## 2025-03-10 DIAGNOSIS — R06.09 DYSPNEA ON EXERTION: ICD-10-CM

## 2025-03-10 DIAGNOSIS — R53.83 OTHER FATIGUE: Primary | ICD-10-CM

## 2025-03-10 DIAGNOSIS — R00.2 PALPITATIONS: ICD-10-CM

## 2025-03-10 DIAGNOSIS — I95.1 ORTHOSTATIC HYPOTENSION: ICD-10-CM

## 2025-03-10 LAB
ALBUMIN SERPL BCG-MCNC: 4.3 G/DL (ref 3.5–5.2)
ALP SERPL-CCNC: 60 U/L (ref 40–150)
ALT SERPL W P-5'-P-CCNC: 26 U/L (ref 0–50)
ANION GAP SERPL CALCULATED.3IONS-SCNC: 10 MMOL/L (ref 7–15)
AST SERPL W P-5'-P-CCNC: 31 U/L (ref 0–45)
BILIRUB SERPL-MCNC: 0.5 MG/DL
BUN SERPL-MCNC: 14.6 MG/DL (ref 8–23)
CALCIUM SERPL-MCNC: 10.1 MG/DL (ref 8.8–10.4)
CHLORIDE SERPL-SCNC: 103 MMOL/L (ref 98–107)
CREAT SERPL-MCNC: 0.77 MG/DL (ref 0.51–0.95)
D DIMER PPP FEU-MCNC: 0.29 UG/ML FEU (ref 0–0.5)
EGFRCR SERPLBLD CKD-EPI 2021: 78 ML/MIN/1.73M2
ERYTHROCYTE [DISTWIDTH] IN BLOOD BY AUTOMATED COUNT: 13.6 % (ref 10–15)
GLUCOSE SERPL-MCNC: 91 MG/DL (ref 70–99)
HCO3 SERPL-SCNC: 26 MMOL/L (ref 22–29)
HCT VFR BLD AUTO: 42.1 % (ref 35–47)
HGB BLD-MCNC: 13.9 G/DL (ref 11.7–15.7)
MAGNESIUM SERPL-MCNC: 2.1 MG/DL (ref 1.7–2.3)
MCH RBC QN AUTO: 33.2 PG (ref 26.5–33)
MCHC RBC AUTO-ENTMCNC: 33 G/DL (ref 31.5–36.5)
MCV RBC AUTO: 101 FL (ref 78–100)
NT-PROBNP SERPL-MCNC: 235 PG/ML (ref 0–1800)
PLATELET # BLD AUTO: 268 10E3/UL (ref 150–450)
POTASSIUM SERPL-SCNC: 4.6 MMOL/L (ref 3.4–5.3)
PROT SERPL-MCNC: 6.8 G/DL (ref 6.4–8.3)
RBC # BLD AUTO: 4.19 10E6/UL (ref 3.8–5.2)
SODIUM SERPL-SCNC: 139 MMOL/L (ref 135–145)
TSH SERPL DL<=0.005 MIU/L-ACNC: 1.93 UIU/ML (ref 0.3–4.2)
WBC # BLD AUTO: 6.4 10E3/UL (ref 4–11)

## 2025-03-10 PROCEDURE — 80053 COMPREHEN METABOLIC PANEL: CPT | Performed by: INTERNAL MEDICINE

## 2025-03-10 PROCEDURE — 85379 FIBRIN DEGRADATION QUANT: CPT | Performed by: INTERNAL MEDICINE

## 2025-03-10 PROCEDURE — 36415 COLL VENOUS BLD VENIPUNCTURE: CPT | Performed by: INTERNAL MEDICINE

## 2025-03-10 PROCEDURE — 85027 COMPLETE CBC AUTOMATED: CPT | Performed by: INTERNAL MEDICINE

## 2025-03-10 PROCEDURE — 86141 C-REACTIVE PROTEIN HS: CPT | Performed by: INTERNAL MEDICINE

## 2025-03-10 PROCEDURE — 3077F SYST BP >= 140 MM HG: CPT | Performed by: INTERNAL MEDICINE

## 2025-03-10 PROCEDURE — 84443 ASSAY THYROID STIM HORMONE: CPT | Performed by: INTERNAL MEDICINE

## 2025-03-10 PROCEDURE — 83880 ASSAY OF NATRIURETIC PEPTIDE: CPT | Performed by: INTERNAL MEDICINE

## 2025-03-10 PROCEDURE — G2211 COMPLEX E/M VISIT ADD ON: HCPCS | Performed by: INTERNAL MEDICINE

## 2025-03-10 PROCEDURE — 83735 ASSAY OF MAGNESIUM: CPT | Performed by: INTERNAL MEDICINE

## 2025-03-10 PROCEDURE — 3078F DIAST BP <80 MM HG: CPT | Performed by: INTERNAL MEDICINE

## 2025-03-10 PROCEDURE — 99214 OFFICE O/P EST MOD 30 MIN: CPT | Performed by: INTERNAL MEDICINE

## 2025-03-10 RX ORDER — LISINOPRIL 2.5 MG/1
1 TABLET ORAL
COMMUNITY
Start: 2025-01-14

## 2025-03-10 NOTE — LETTER
"3/10/2025    LUCRETIA ROSARIO  [address]  [facility] [id number]    RE: Shantel Romero       Dear Colleague,     I had the pleasure of seeing Shantel Romero in the [facility].    Thank you, Dr. Cabrera, for asking the [facility] team to see Ms. Shantel Romero to evaluate       Assessment/Recommendations   Assessment/Plan:  CAD hold asp to see if helps with \"buzzing\" in her ear,   Hyperlipidemia - cont atorvastatin and zetia wt LDL at target, repeat in fall  HTN on lisinopril 2.5mg   Orthostatic drop in BP - with dyspnea on exertion and fatigue -s/p XRT for lung cancer.  note borderline oxygen saturation, d-dimer, TSH, CBC, CMP, Mg, HSCRP, she is awera if abn d-dimer obtain CTPE study    Follow up one year     History of Present Illness/Subjective    Ms. Shantel Romero is a 79 year old female with mild CAD, hyperlipidemia, lung cancer s/p XRT with follow up PET scan (followed by Dr. Barnhart), 10/24 LDL 63 on atorvastatin 40mg and asp 81mg, no GI/Gu bleeding, no syncopal events, chronic dizzy spells and \"buzzing\" in th eright ear 24 hours a day, dizzy spells with standing, started with ear problem. She said light headed not vertigo symptoms. Epistaxis last mo.  No PND/rothopena, edema.  She notes more rashes lately.      Sat 90% at ret and HR 80  138/80  Standing sat 92% with HR 76 to 82  130/78     Physical Examination Review of Systems   BP (!) 146/79 (BP Location: Left arm, Patient Position: Sitting, Cuff Size: Adult Large)   Pulse 81   Wt 72.1 kg (159 lb)   SpO2 97%   BMI 25.66 kg/m    Body mass index is 25.66 kg/m .  Wt Readings from Last 3 Encounters:   03/10/25 72.1 kg (159 lb)   11/18/24 72.2 kg (159 lb 3.2 oz)   08/13/24 71.1 kg (156 lb 12.8 oz)     [unfilled]  General Appearance:   no distress, normal body habitus   ENT/Mouth: membranes moist, no oral lesions or bleeding gums.      EYES:  no scleral icterus, normal conjunctivae   Neck: no carotid " bruits or thyromegaly   Chest/Lungs:   lungs are clear to auscultation, no rales or wheezing,  sternal scar, equal chest wall expansion    Cardiovascular:   Regular. Normal first and second heart sounds with no murmurs, rubs, or gallops; the carotid, radial and posterior tibial pulses are intact, Jugular venous pressure , edema bilaterally    Abdomen:  no organomegaly, masses, bruits, or tenderness; bowel sounds are present   Extremities: no cyanosis or clubbing   Skin: no xanthelasma, warm.    Neurologic: normal  bilateral, no tremors     Psychiatric: alert and oriented x3, calm     Review of Systems - 12 points nega other than above      Medical History  Surgical History Family History Social History   Past Medical History:   Diagnosis Date     COPD (chronic obstructive pulmonary disease) (H)      Coronary artery disease due to lipid rich plaque 2021    Abnormal coronary CTA     Family history of myocardial infarction     mother under age 60's  CVA and MI     High cholesterol      Hypertension      PVC's (premature ventricular contractions)      Syncope      Tubular adenoma of colon     Past Surgical History:   Procedure Laterality Date     APPENDECTOMY       SHOULDER SURGERY Right      TUBAL LIGATION      Family History   Problem Relation Age of Onset     Lung Cancer Father         lung     Breast Cancer Paternal Aunt      Cerebrovascular Disease Mother      Coronary Artery Disease Mother     Social History     Socioeconomic History     Marital status:      Spouse name: Not on file     Number of children: Not on file     Years of education: Not on file     Highest education level: Not on file   Occupational History     Not on file   Tobacco Use     Smoking status: Former     Current packs/day: 0.00     Types: Cigarettes     Quit date:      Years since quittin.1     Passive exposure: Past (Dad occasionally smoked a pipe)     Smokeless tobacco: Never   Vaping Use     Vaping status: Never  Used   Substance and Sexual Activity     Alcohol use: Not on file     Drug use: Not on file     Sexual activity: Not on file   Other Topics Concern     Parent/sibling w/ CABG, MI or angioplasty before 65F 55M? Not Asked   Social History Narrative     Not on file     Social Drivers of Health     Financial Resource Strain: Not on file   Food Insecurity: Not on file   Transportation Needs: Not on file   Physical Activity: Not on file   Stress: Not on file   Social Connections: Not on file   Interpersonal Safety: Not on file   Housing Stability: Not on file          Medications  Allergies   Scheduled Meds:  No current facility-administered medications for this visit.     Continuous Infusions:  No current facility-administered medications for this visit.     PRN Meds:.  No current facility-administered medications for this visit.    No Known Allergies      Lab Results    Chemistry/lipid CBC Cardiac Enzymes/BNP/TSH/INR   Lab Results   Component Value Date    CHOL 171 10/28/2024    HDL 95 10/28/2024    TRIG 64 10/28/2024    BUN 15.7 11/18/2024     11/18/2024    CO2 24 11/18/2024    Lab Results   Component Value Date    WBC 6.4 11/18/2024    HGB 13.9 11/18/2024    HCT 42.3 11/18/2024     (H) 11/18/2024     11/18/2024    Lab Results   Component Value Date    TROPONINI <0.01 08/09/2022    TSH 1.55 10/07/2019    INR 1.00 01/29/2021              Elliot Cabrera MD  Interventional Cardiology  [facility]              Thank you for allowing me to participate in the care of your patient.      Sincerely,     Elliot Cabrera MD     [facility]  cc:   Elliot Cabrera MD  [address] [facility] [place] [address]

## 2025-03-10 NOTE — PROGRESS NOTES
"  Thank you, Dr. Cabrera, for asking the [facility] team to see Ms. Shantel Romero to evaluate       Assessment/Recommendations   Assessment/Plan:  CAD hold asp to see if helps with \"buzzing\" in her ear,   Hyperlipidemia - cont atorvastatin and zetia wt LDL at target, repeat in fall  HTN on lisinopril 2.5mg   Orthostatic drop in BP - with dyspnea on exertion and fatigue -s/p XRT for lung cancer.  note borderline oxygen saturation, d-dimer, TSH, CBC, CMP, Mg, HSCRP, she is awera if abn d-dimer obtain CTPE study    Follow up one year     History of Present Illness/Subjective    Ms. Shantel Romero is a 79 year old female with mild CAD, hyperlipidemia, lung cancer s/p XRT with follow up PET scan (followed by Dr. Barnhart), 10/24 LDL 63 on atorvastatin 40mg and asp 81mg, no GI/Gu bleeding, no syncopal events, chronic dizzy spells and \"buzzing\" in th eright ear 24 hours a day, dizzy spells with standing, started with ear problem. She said light headed not vertigo symptoms. Epistaxis last mo.  No PND/rothopena, edema.  She notes more rashes lately.      Sat 90% at ret and HR 80  138/80  Standing sat 92% with HR 76 to 82  130/78     Physical Examination Review of Systems   BP (!) 146/79 (BP Location: Left arm, Patient Position: Sitting, Cuff Size: Adult Large)   Pulse 81   Wt 72.1 kg (159 lb)   SpO2 97%   BMI 25.66 kg/m    Body mass index is 25.66 kg/m .  Wt Readings from Last 3 Encounters:   03/10/25 72.1 kg (159 lb)   11/18/24 72.2 kg (159 lb 3.2 oz)   08/13/24 71.1 kg (156 lb 12.8 oz)     [unfilled]  General Appearance:   no distress, normal body habitus   ENT/Mouth: membranes moist, no oral lesions or bleeding gums.      EYES:  no scleral icterus, normal conjunctivae   Neck: no carotid bruits or thyromegaly   Chest/Lungs:   lungs are clear to auscultation, no rales or wheezing,  sternal scar, equal chest wall expansion    Cardiovascular:   Regular. Normal first and second heart sounds with no " murmurs, rubs, or gallops; the carotid, radial and posterior tibial pulses are intact, Jugular venous pressure , edema bilaterally    Abdomen:  no organomegaly, masses, bruits, or tenderness; bowel sounds are present   Extremities: no cyanosis or clubbing   Skin: no xanthelasma, warm.    Neurologic: normal  bilateral, no tremors     Psychiatric: alert and oriented x3, calm     Review of Systems - 12 points nega other than above      Medical History  Surgical History Family History Social History   Past Medical History:   Diagnosis Date    COPD (chronic obstructive pulmonary disease) (H)     Coronary artery disease due to lipid rich plaque 2021    Abnormal coronary CTA    Family history of myocardial infarction     mother under age 60's  CVA and MI    High cholesterol     Hypertension     PVC's (premature ventricular contractions)     Syncope     Tubular adenoma of colon     Past Surgical History:   Procedure Laterality Date    APPENDECTOMY      SHOULDER SURGERY Right     TUBAL LIGATION      Family History   Problem Relation Age of Onset    Lung Cancer Father         lung    Breast Cancer Paternal Aunt     Cerebrovascular Disease Mother     Coronary Artery Disease Mother     Social History     Socioeconomic History    Marital status:      Spouse name: Not on file    Number of children: Not on file    Years of education: Not on file    Highest education level: Not on file   Occupational History    Not on file   Tobacco Use    Smoking status: Former     Current packs/day: 0.00     Types: Cigarettes     Quit date:      Years since quittin.1     Passive exposure: Past (Dad occasionally smoked a pipe)    Smokeless tobacco: Never   Vaping Use    Vaping status: Never Used   Substance and Sexual Activity    Alcohol use: Not on file    Drug use: Not on file    Sexual activity: Not on file   Other Topics Concern    Parent/sibling w/ CABG, MI or angioplasty before 65F 55M? Not Asked   Social History  Narrative    Not on file     Social Drivers of Health     Financial Resource Strain: Not on file   Food Insecurity: Not on file   Transportation Needs: Not on file   Physical Activity: Not on file   Stress: Not on file   Social Connections: Not on file   Interpersonal Safety: Not on file   Housing Stability: Not on file          Medications  Allergies   Scheduled Meds:  No current facility-administered medications for this visit.     Continuous Infusions:  No current facility-administered medications for this visit.     PRN Meds:.  No current facility-administered medications for this visit.    No Known Allergies      Lab Results    Chemistry/lipid CBC Cardiac Enzymes/BNP/TSH/INR   Lab Results   Component Value Date    CHOL 171 10/28/2024    HDL 95 10/28/2024    TRIG 64 10/28/2024    BUN 15.7 11/18/2024     11/18/2024    CO2 24 11/18/2024    Lab Results   Component Value Date    WBC 6.4 11/18/2024    HGB 13.9 11/18/2024    HCT 42.3 11/18/2024     (H) 11/18/2024     11/18/2024    Lab Results   Component Value Date    TROPONINI <0.01 08/09/2022    TSH 1.55 10/07/2019    INR 1.00 01/29/2021              Elliot Cabrera MD  Interventional Cardiology  [facility]

## 2025-03-11 LAB — CRP SERPL HS-MCNC: 1.36 MG/L

## 2025-03-12 ENCOUNTER — MYC REFILL (OUTPATIENT)
Dept: CARDIOLOGY | Facility: CLINIC | Age: 80
End: 2025-03-12
Payer: MEDICARE

## 2025-03-12 DIAGNOSIS — E78.5 HYPERLIPIDEMIA LDL GOAL <70: ICD-10-CM

## 2025-03-12 RX ORDER — EZETIMIBE 10 MG/1
10 TABLET ORAL DAILY
Qty: 90 TABLET | Refills: 3 | Status: SHIPPED | OUTPATIENT
Start: 2025-03-12

## 2025-04-02 DIAGNOSIS — I95.1 ORTHOSTATIC HYPOTENSION: Primary | ICD-10-CM

## 2025-04-21 ENCOUNTER — MYC REFILL (OUTPATIENT)
Dept: CARDIOLOGY | Facility: CLINIC | Age: 80
End: 2025-04-21
Payer: MEDICARE

## 2025-04-21 DIAGNOSIS — I25.83 CORONARY ARTERY DISEASE DUE TO LIPID RICH PLAQUE: ICD-10-CM

## 2025-04-21 DIAGNOSIS — I25.10 CORONARY ARTERY DISEASE DUE TO LIPID RICH PLAQUE: ICD-10-CM

## 2025-04-21 RX ORDER — NITROGLYCERIN 40 MG/1
1 PATCH TRANSDERMAL DAILY
Qty: 30 PATCH | Refills: 0 | Status: SHIPPED | OUTPATIENT
Start: 2025-04-21

## 2025-05-15 ENCOUNTER — HOSPITAL ENCOUNTER (OUTPATIENT)
Dept: PET IMAGING | Facility: HOSPITAL | Age: 80
End: 2025-05-15
Attending: INTERNAL MEDICINE
Payer: MEDICARE

## 2025-05-15 DIAGNOSIS — C34.31 MALIGNANT NEOPLASM OF LOWER LOBE OF RIGHT LUNG (H): ICD-10-CM

## 2025-05-15 LAB
CREAT BLD-MCNC: 0.8 MG/DL (ref 0.5–1)
EGFRCR SERPLBLD CKD-EPI 2021: >60 ML/MIN/1.73M2
GLUCOSE BLDC GLUCOMTR-MCNC: 90 MG/DL (ref 70–99)

## 2025-05-15 PROCEDURE — 343N000001 HC RX 343 MED OP 636: Performed by: INTERNAL MEDICINE

## 2025-05-15 PROCEDURE — 82565 ASSAY OF CREATININE: CPT

## 2025-05-15 PROCEDURE — A9552 F18 FDG: HCPCS | Performed by: INTERNAL MEDICINE

## 2025-05-15 PROCEDURE — 82962 GLUCOSE BLOOD TEST: CPT

## 2025-05-15 PROCEDURE — 78816 PET IMAGE W/CT FULL BODY: CPT | Mod: PS

## 2025-05-15 RX ORDER — FLUDEOXYGLUCOSE F 18 200 MCI/ML
9-18 INJECTION, SOLUTION INTRAVENOUS ONCE
Status: COMPLETED | OUTPATIENT
Start: 2025-05-15 | End: 2025-05-15

## 2025-05-15 RX ADMIN — FLUDEOXYGLUCOSE F 18 10.19 MILLICURIE: 200 INJECTION, SOLUTION INTRAVENOUS at 11:10

## 2025-05-19 ENCOUNTER — ONCOLOGY VISIT (OUTPATIENT)
Dept: ONCOLOGY | Facility: HOSPITAL | Age: 80
End: 2025-05-19
Attending: INTERNAL MEDICINE
Payer: MEDICARE

## 2025-05-19 VITALS
RESPIRATION RATE: 16 BRPM | TEMPERATURE: 98.7 F | WEIGHT: 159 LBS | DIASTOLIC BLOOD PRESSURE: 94 MMHG | BODY MASS INDEX: 25.55 KG/M2 | OXYGEN SATURATION: 94 % | SYSTOLIC BLOOD PRESSURE: 170 MMHG | HEIGHT: 66 IN | HEART RATE: 82 BPM

## 2025-05-19 DIAGNOSIS — C34.31 MALIGNANT NEOPLASM OF LOWER LOBE OF RIGHT LUNG (H): Primary | ICD-10-CM

## 2025-05-19 PROCEDURE — G0463 HOSPITAL OUTPT CLINIC VISIT: HCPCS | Performed by: INTERNAL MEDICINE

## 2025-05-19 PROCEDURE — 99214 OFFICE O/P EST MOD 30 MIN: CPT | Performed by: INTERNAL MEDICINE

## 2025-05-19 PROCEDURE — G2211 COMPLEX E/M VISIT ADD ON: HCPCS | Performed by: INTERNAL MEDICINE

## 2025-05-19 ASSESSMENT — PAIN SCALES - GENERAL: PAINLEVEL_OUTOF10: SEVERE PAIN (9)

## 2025-05-19 NOTE — PROGRESS NOTES
[facility] Hematology and Oncology Progress Note    Patient: Shantel Romero  MRN: [id number]  Date of Service: May 19, 2025        Assessment and Plan:    1.  Right-sided lung cancer: She had a PET/CT performed on May 15, 2025.  I personally reviewed the images and shared them with Cosme.  I interpreted them for the.  The infiltrate/mass in the right lung is mildly FDG avid.  No significant change when compared to September 2023.  Nothing does clearly suggest active neoplasm.  As such, we will continue to follow her every 6 months.  Her next imaging will be a CT scan.  She will let us know the interim if she develops any new symptoms.    Data review:  CBC from today is reviewed and shows a white count of 7.9, hemoglobin 13.1, platelets 240,000.    ECOG Performance  1    Diagnosis:    1.  Right upper lobe lung mass: Initially seen in 2019. Has not been biopsied.    Treatment:    Stereotactic radiosurgery: Right upper lobe nodule. 5400 cGy given in 3 fractions. Completed July 9, 2021.    SBRT delivered to a right lower lobe nodule. 5400 cGy in 3 fractions. October 6 through October 11, 2023.    Interim History:    Cosme returns today for follow-up visit.  She is last seen about 4 months ago.  She is here to review a PET scan.  Overall she is doing okay.  Energy and appetite are okay.  No change in her respiratory symptoms.  Some continued dyspnea on exertion.    Review of Systems:    As above in the history.     Review of Systems otherwise Negative for:  General: chills, fever or night sweats  Psychological: anxiety or depression  Ophthalmic: blurry vision, double vision or loss of vision, vision change  ENT: epistaxis, oral lesions, hearing changes  Hematological and Lymphatic: bleeding, bruising, jaundice, swollen lymph nodes  Endocrine: hot flashes, unexpected weight changes  Respiratory: cough, hemoptysis, orthopnea  Cardiovascular: chest pain, edema, palpitations or PND  Gastrointestinal: abdominal pain,  "blood in stools, change in bowel habits, constipation, diarrhea or nausea/vomiting  Genito-Urinary: change in urinary stream, incontinence, frequency/urgency  Musculoskeletal: joint stiffness, swelling, muscle pain  Neurological: dizziness, headaches, numbness/tingling  Dermatological: lumps and rash    Past History:    Past Medical History:   Diagnosis Date    COPD (chronic obstructive pulmonary disease) (H)     Coronary artery disease due to lipid rich plaque 02/2021    Abnormal coronary CTA    Family history of myocardial infarction     mother under age 60's  CVA and MI    High cholesterol     Hypertension     PVC's (premature ventricular contractions)     Syncope 2012    Tubular adenoma of colon      Physical Exam:    BP (!) 170/94 (BP Location: Left arm, Patient Position: Sitting, Cuff Size: Adult Regular)   Pulse 82   Temp 98.7  F (37.1  C) (Tympanic)   Resp 16   Ht 1.676 m (5' 6\")   Wt 72.1 kg (159 lb)   SpO2 94%   BMI 25.66 kg/m      General: patient appears stated age of 76 year old. Nontoxic and in no distress.   HEENT: Head: atraumatic, normocephalic.  Sclerae anicteric.  Chest:  Normal respiratory effort  Cardiac:  No edema.   Abdomen: abdomen is non-distended  Extremities: normal tone and muscle bulk.  Skin: no lesions or rash on visible skin. Warm and dry.   CNS: alert and oriented. Grossly non-focal.   Psychiatric: normal mood and affect.     Lab Results:    Recent Results (from the past week)   Glucose by meter   Result Value Ref Range    GLUCOSE BY METER POCT 90 70 - 99 mg/dL   Creatinine POCT   Result Value Ref Range    Creatinine POCT 0.8 0.5 - 1.0 mg/dL    GFR, ESTIMATED POCT >60 >60 mL/min/1.73m2      Imaging:    PET Oncology Whole Body  Result Date: 5/16/2025  EXAM: PET ONCOLOGY WHOLE BODY, CT CHEST W CONTRAST LOCATION: [facility] DATE: 5/15/2025 INDICATION: Subsequent treatment planning and restaging for malignant neoplasm of upper lobe, right bronchus or lung. " Status post right upper lobe radiation therapy completed in July 2021 and right lower lobe radiation therapy completed in October 2023. Monitor treatment response. COMPARISON: Thoracic CT dated 11/15/2024 and FDG PET/CT dated 9/8/2023, 2/4/2021 CONTRAST: None TECHNIQUE: Serum glucose level 90 mg/dL. One hour post intravenous administration of 10.19mCi F18 FDG, PET imaging was performed from the skull vertex to feet, utilizing attenuation correction with concurrent axial CT and PET/CT image fusion. Separate diagnostic CT of the chest was performed. Dose reduction techniques were used. PET/CT FINDINGS: FDG avid airspace opacity in the lateral right upper lobe with more central nodular component measuring 1.0 x 1.0 cm (max SUV 3.9, previously 3.2 on 9/8/2023 and 2.1 on 2/4/2021). FDG avid airspace opacity in the right lower lobe at the site of previously seen pulmonary nodules likely representing treatment change without convincing evidence of active neoplasm in this region. Diffuse esophagitis. Shoulder, hip, and knee synovitis. CT FINDINGS: Postoperative change of the bilateral lenses. Mild carotid artery bifurcation calcification. Mild to moderate coronary artery calcium. Severe emphysema. Right renal cyst. Sigmoid diverticulosis. Multilevel degenerative changes of the mildly scoliotic spine. The lower extremities are unremarkable.     IMPRESSION: While findings are favored to represent evolving treatment change in the right upper and lower lobes, the central nodular region in the right upper lobe is somewhat atypical and warrants continued imaging surveillance    CT Chest w Contrast  Result Date: 5/16/2025  EXAM: PET ONCOLOGY WHOLE BODY, CT CHEST W CONTRAST LOCATION: [facility] DATE: 5/15/2025 INDICATION: Subsequent treatment planning and restaging for malignant neoplasm of upper lobe, right bronchus or lung. Status post right upper lobe radiation therapy completed in July 2021 and right  lower lobe radiation therapy completed in October 2023. Monitor treatment response. COMPARISON: Thoracic CT dated 11/15/2024 and FDG PET/CT dated 9/8/2023, 2/4/2021 CONTRAST: None TECHNIQUE: Serum glucose level 90 mg/dL. One hour post intravenous administration of 10.19mCi F18 FDG, PET imaging was performed from the skull vertex to feet, utilizing attenuation correction with concurrent axial CT and PET/CT image fusion. Separate diagnostic CT of the chest was performed. Dose reduction techniques were used. PET/CT FINDINGS: FDG avid airspace opacity in the lateral right upper lobe with more central nodular component measuring 1.0 x 1.0 cm (max SUV 3.9, previously 3.2 on 9/8/2023 and 2.1 on 2/4/2021). FDG avid airspace opacity in the right lower lobe at the site of previously seen pulmonary nodules likely representing treatment change without convincing evidence of active neoplasm in this region. Diffuse esophagitis. Shoulder, hip, and knee synovitis. CT FINDINGS: Postoperative change of the bilateral lenses. Mild carotid artery bifurcation calcification. Mild to moderate coronary artery calcium. Severe emphysema. Right renal cyst. Sigmoid diverticulosis. Multilevel degenerative changes of the mildly scoliotic spine. The lower extremities are unremarkable.     IMPRESSION: While findings are favored to represent evolving treatment change in the right upper and lower lobes, the central nodular region in the right upper lobe is somewhat atypical and warrants continued imaging surveillance      Signed by: Timoteo Barnhart MD

## 2025-05-19 NOTE — LETTER
"5/19/2025      Shantel Romero  [address]  [place] [address]      Dear Colleague,    Thank you for referring your patient, Shantel Romero, to the [facility]. Please see a copy of my visit note below.    Oncology Rooming Note    May 19, 2025 1:38 PM   Shantel Romero is a 79 year old female who presents for:    Chief Complaint   Patient presents with     Oncology Clinic Visit     Malignant neoplasm of lower lobe of right lung      Initial Vitals: BP (!) 170/94 (BP Location: Left arm, Patient Position: Sitting, Cuff Size: Adult Regular)   Pulse 82   Temp 98.7  F (37.1  C) (Tympanic)   Resp 16   Ht 1.676 m (5' 6\")   Wt 72.1 kg (159 lb)   SpO2 94%   BMI 25.66 kg/m   Estimated body mass index is 25.66 kg/m  as calculated from the following:    Height as of this encounter: 1.676 m (5' 6\").    Weight as of this encounter: 72.1 kg (159 lb). Body surface area is 1.83 meters squared.  Severe Pain (9) Comment: Data Unavailable   No LMP recorded. Patient is postmenopausal.  Allergies reviewed: Yes  Medications reviewed: Yes    Medications: Medication refills not needed today.  Pharmacy name entered into [employer]: [id number] - [place], [place] - [facility] [address] AT [facility] [address]    Frailty Screening:   Is the patient here for a new oncology consult visit in cancer care? 2. No    PHQ9:  Did this patient require a PHQ9?: No      Clinical concerns:  6 month follow up       Luz Matos              [facility] Hematology and Oncology Progress Note    Patient: Shantel Romero  MRN: [id number]  Date of Service: May 19, 2025        Assessment and Plan:    1.  Right-sided lung cancer: She had a PET/CT performed on May 15, 2025.  I personally reviewed the images and shared them with Cosme.  I interpreted them for the.  The infiltrate/mass in the right lung is mildly FDG avid.  No significant change when compared to September 2023.  Nothing " does clearly suggest active neoplasm.  As such, we will continue to follow her every 6 months.  Her next imaging will be a CT scan.  She will let us know the interim if she develops any new symptoms.    Data review:  CBC from today is reviewed and shows a white count of 7.9, hemoglobin 13.1, platelets 240,000.    ECOG Performance  1    Diagnosis:    1.  Right upper lobe lung mass: Initially seen in 2019. Has not been biopsied.    Treatment:    Stereotactic radiosurgery: Right upper lobe nodule. 5400 cGy given in 3 fractions. Completed July 9, 2021.    SBRT delivered to a right lower lobe nodule. 5400 cGy in 3 fractions. October 6 through October 11, 2023.    Interim History:    Jan returns today for follow-up visit.  She is last seen about 4 months ago.  She is here to review a PET scan.  Overall she is doing okay.  Energy and appetite are okay.  No change in her respiratory symptoms.  Some continued dyspnea on exertion.    Review of Systems:    As above in the history.     Review of Systems otherwise Negative for:  General: chills, fever or night sweats  Psychological: anxiety or depression  Ophthalmic: blurry vision, double vision or loss of vision, vision change  ENT: epistaxis, oral lesions, hearing changes  Hematological and Lymphatic: bleeding, bruising, jaundice, swollen lymph nodes  Endocrine: hot flashes, unexpected weight changes  Respiratory: cough, hemoptysis, orthopnea  Cardiovascular: chest pain, edema, palpitations or PND  Gastrointestinal: abdominal pain, blood in stools, change in bowel habits, constipation, diarrhea or nausea/vomiting  Genito-Urinary: change in urinary stream, incontinence, frequency/urgency  Musculoskeletal: joint stiffness, swelling, muscle pain  Neurological: dizziness, headaches, numbness/tingling  Dermatological: lumps and rash    Past History:    Past Medical History:   Diagnosis Date     COPD (chronic obstructive pulmonary disease) (H)      Coronary artery disease due to  "lipid rich plaque 02/2021    Abnormal coronary CTA     Family history of myocardial infarction     mother under age 60's  CVA and MI     High cholesterol      Hypertension      PVC's (premature ventricular contractions)      Syncope 2012     Tubular adenoma of colon      Physical Exam:    BP (!) 170/94 (BP Location: Left arm, Patient Position: Sitting, Cuff Size: Adult Regular)   Pulse 82   Temp 98.7  F (37.1  C) (Tympanic)   Resp 16   Ht 1.676 m (5' 6\")   Wt 72.1 kg (159 lb)   SpO2 94%   BMI 25.66 kg/m      General: patient appears stated age of 76 year old. Nontoxic and in no distress.   HEENT: Head: atraumatic, normocephalic.  Sclerae anicteric.  Chest:  Normal respiratory effort  Cardiac:  No edema.   Abdomen: abdomen is non-distended  Extremities: normal tone and muscle bulk.  Skin: no lesions or rash on visible skin. Warm and dry.   CNS: alert and oriented. Grossly non-focal.   Psychiatric: normal mood and affect.     Lab Results:    Recent Results (from the past week)   Glucose by meter   Result Value Ref Range    GLUCOSE BY METER POCT 90 70 - 99 mg/dL   Creatinine POCT   Result Value Ref Range    Creatinine POCT 0.8 0.5 - 1.0 mg/dL    GFR, ESTIMATED POCT >60 >60 mL/min/1.73m2      Imaging:    PET Oncology Whole Body  Result Date: 5/16/2025  EXAM: PET ONCOLOGY WHOLE BODY, CT CHEST W CONTRAST LOCATION: [facility] DATE: 5/15/2025 INDICATION: Subsequent treatment planning and restaging for malignant neoplasm of upper lobe, right bronchus or lung. Status post right upper lobe radiation therapy completed in July 2021 and right lower lobe radiation therapy completed in October 2023. Monitor treatment response. COMPARISON: Thoracic CT dated 11/15/2024 and FDG PET/CT dated 9/8/2023, 2/4/2021 CONTRAST: None TECHNIQUE: Serum glucose level 90 mg/dL. One hour post intravenous administration of 10.19mCi F18 FDG, PET imaging was performed from the skull vertex to feet, utilizing attenuation " correction with concurrent axial CT and PET/CT image fusion. Separate diagnostic CT of the chest was performed. Dose reduction techniques were used. PET/CT FINDINGS: FDG avid airspace opacity in the lateral right upper lobe with more central nodular component measuring 1.0 x 1.0 cm (max SUV 3.9, previously 3.2 on 9/8/2023 and 2.1 on 2/4/2021). FDG avid airspace opacity in the right lower lobe at the site of previously seen pulmonary nodules likely representing treatment change without convincing evidence of active neoplasm in this region. Diffuse esophagitis. Shoulder, hip, and knee synovitis. CT FINDINGS: Postoperative change of the bilateral lenses. Mild carotid artery bifurcation calcification. Mild to moderate coronary artery calcium. Severe emphysema. Right renal cyst. Sigmoid diverticulosis. Multilevel degenerative changes of the mildly scoliotic spine. The lower extremities are unremarkable.     IMPRESSION: While findings are favored to represent evolving treatment change in the right upper and lower lobes, the central nodular region in the right upper lobe is somewhat atypical and warrants continued imaging surveillance    CT Chest w Contrast  Result Date: 5/16/2025  EXAM: PET ONCOLOGY WHOLE BODY, CT CHEST W CONTRAST LOCATION: [facility] DATE: 5/15/2025 INDICATION: Subsequent treatment planning and restaging for malignant neoplasm of upper lobe, right bronchus or lung. Status post right upper lobe radiation therapy completed in July 2021 and right lower lobe radiation therapy completed in October 2023. Monitor treatment response. COMPARISON: Thoracic CT dated 11/15/2024 and FDG PET/CT dated 9/8/2023, 2/4/2021 CONTRAST: None TECHNIQUE: Serum glucose level 90 mg/dL. One hour post intravenous administration of 10.19mCi F18 FDG, PET imaging was performed from the skull vertex to feet, utilizing attenuation correction with concurrent axial CT and PET/CT image fusion. Separate diagnostic  CT of the chest was performed. Dose reduction techniques were used. PET/CT FINDINGS: FDG avid airspace opacity in the lateral right upper lobe with more central nodular component measuring 1.0 x 1.0 cm (max SUV 3.9, previously 3.2 on 9/8/2023 and 2.1 on 2/4/2021). FDG avid airspace opacity in the right lower lobe at the site of previously seen pulmonary nodules likely representing treatment change without convincing evidence of active neoplasm in this region. Diffuse esophagitis. Shoulder, hip, and knee synovitis. CT FINDINGS: Postoperative change of the bilateral lenses. Mild carotid artery bifurcation calcification. Mild to moderate coronary artery calcium. Severe emphysema. Right renal cyst. Sigmoid diverticulosis. Multilevel degenerative changes of the mildly scoliotic spine. The lower extremities are unremarkable.     IMPRESSION: While findings are favored to represent evolving treatment change in the right upper and lower lobes, the central nodular region in the right upper lobe is somewhat atypical and warrants continued imaging surveillance      Signed by: Timoteo Barnhart MD      Again, thank you for allowing me to participate in the care of your patient.        Sincerely,        Timoteo Barnhart MD    Electronically signed

## 2025-05-19 NOTE — PROGRESS NOTES
"Oncology Rooming Note    May 19, 2025 1:38 PM   Shantel Romero is a 79 year old female who presents for:    Chief Complaint   Patient presents with    Oncology Clinic Visit     Malignant neoplasm of lower lobe of right lung      Initial Vitals: BP (!) 170/94 (BP Location: Left arm, Patient Position: Sitting, Cuff Size: Adult Regular)   Pulse 82   Temp 98.7  F (37.1  C) (Tympanic)   Resp 16   Ht 1.676 m (5' 6\")   Wt 72.1 kg (159 lb)   SpO2 94%   BMI 25.66 kg/m   Estimated body mass index is 25.66 kg/m  as calculated from the following:    Height as of this encounter: 1.676 m (5' 6\").    Weight as of this encounter: 72.1 kg (159 lb). Body surface area is 1.83 meters squared.  Severe Pain (9) Comment: Data Unavailable   No LMP recorded. Patient is postmenopausal.  Allergies reviewed: Yes  Medications reviewed: Yes    Medications: Medication refills not needed today.  Pharmacy name entered into [employer]: [facility] #[id number] - [place], [place] - [facility] [address] AT [facility] [address]    Frailty Screening:   Is the patient here for a new oncology consult visit in cancer care? 2. No    PHQ9:  Did this patient require a PHQ9?: No      Clinical concerns:  6 month follow up       Luz Matos            "

## 2025-06-16 NOTE — PROGRESS NOTES
"  HEART CARE ENCOUNTER CONSULTATON NOTE      [facility]  [phone]      Assessment/Recommendations   Assessment:   Lightheadedness/dizziness: possible orthostatic blood pressure with symptoms with positional changes (not all) but also at rest and activity. Ongoing for years. No sustained arrhythmias or atrial fibrillation on ZIO monitor.  Moderate increased burden of PACs/PVCs, nonsustained SVT, 1 episode 4 beats NSVT.  No symptom correlation to nonsustained arrhythmias, correlated to sinus rhythm with and without PACs/PVC. TSH/ddimer/CBC/CMP/BNP stable/wnl.  Does not monitor blood pressure at home.  Dyspnea with exertion: Suspect COPD/pulmonary origin.  Mild nonobstructive CAD and controlled risk factors.  Coronary artery disease: Aspirin held for \"buzzing in her ear \" for many years not improved.  CT coronary angiogram 2021 with mild nonobstructive disease of left main, LAD, left circumflex.  Nonischemic NM Lexiscan stress test 2022.  Hyperlipidemia: Controlled on Zetia and atorvastatin  Hypertension: Controlled on lisinopril 2.5 mg   Right-sided lung cancer: s/p XRT. Follows Heme/Onc Dr. Barnhart      Plan:   Stop lisinopril with suspected orthostatic symptoms.  No other indication to continue low-dose.  Monitor blood pressure.  Resume aspirin 81 mg once daily  Discuss dyspnea/inhalers with pulmonologist  Refill for nitroglycerin transdermal patch.  Discussed necessity of this medication, this has been reassuring and symptomatically improving for her in the past and she desires to continue it.      Follow up in 2-3 months for BP monitoring     History of Present Illness/Subjective    HPI: Shantel Romero is a 79 year old female with PMHx of CAD, HLD, HTN, COPD, lung cancer presents for follow up dizziness.    Patient reports no change to lightheadedness/dizziness symptoms.  Daily symptom, described as feeling \"woozy \".  Worse in the last few weeks, suspects humidity/smoke.  Uses daily and " "as needed inhaler.  Does not think she uses albuterol inhaler improperly and medication gets \"on her throat \".  Occasionally more severe, severe episodes not captured on ZIO monitor.  ZIO monitor captured nonsustained tachyarrhythmias that were not associated with symptoms.  Mild increase in ectopy.  Symptom triggers correlated with sinus rhythm with and without ectopy.  Thinks that she had a skin reaction to the Zio patch, was not localized to adhesive throughout her chest.  Aspirin discontinued in buzzing in her ear is not changed.  Reports years long symptom, called tinnitus in the past.  Continues to not take aspirin.    Denies chest pain, edema.      Zio monitoring from 3/17/2025 to 3/24/2025 (duration 6d 17h)  Predominant rhythm was sinus rhythm, 59 to 123bpm, average 81bpm.  17 episode(s) of nonsustained supraventricular tachycardia - longest 15 beats at 107bpm, fastest 6 beats at 188bpm.  1 episode(s) of nonsustained ventricular tachycardia - 4 beats, 185bpm.  No sustained tachyarrhythmias.  No atrial fibrillation.  There were no pauses of greater than 3 seconds.  Occasional premature atrial contractions beats (isolated 1.3%).  Occasional premature ventricular contractions (isolated 3.0%).  Symptom triggers and diary entries (12) correlated to sinus rhythm with and without PACs, PVCs.     Echocardiogram 1/2021 Results:    No previous study for comparison.    Normal left ventricular size. Mild left ventricular hypertrophy    Left ventricle ejection fraction is normal. The calculated left ventricular ejection fraction is 67%.    Possible mild right ventricular enlargement.    Normal right ventricular systolic function.    Mild right atrial enlargement.    Aortic valve sclerosis without evidence for significant aortic stenosis. Mild aortic insufficiency.    Trace pericardial effusion suggested        Physical Examination  Review of Systems   Vitals: /76 (BP Location: Left arm, Patient Position: Sitting, " "Cuff Size: Adult Regular)   Pulse 88   Resp 16   Ht 1.676 m (5' 6\")   Wt 71.2 kg (157 lb)   BMI 25.34 kg/m    BMI= Body mass index is 25.34 kg/m .  Wt Readings from Last 3 Encounters:   06/17/25 71.2 kg (157 lb)   05/19/25 72.1 kg (159 lb)   03/10/25 72.1 kg (159 lb)           ENT/Mouth: membranes moist, no oral lesions or bleeding gums.      EYES:  no scleral icterus, normal conjunctivae       Chest/Lungs:   lungs are clear to auscultation but distant, no rales or wheezing, equal chest wall expansion    Cardiovascular:   Regular. Normal first and second heart sounds with no murmurs, rubs, or gallops; the radial  pulses are intact,  absent edema bilaterally        Extremities: no cyanosis or clubbing   Skin: no xanthelasma, warm.    Neurologic:  no tremors     Psychiatric: alert and oriented x3, calm        Please refer above for cardiac ROS details.        Medical History  Surgical History Family History Social History   Past Medical History:   Diagnosis Date    COPD (chronic obstructive pulmonary disease) (H)     Coronary artery disease due to lipid rich plaque 02/2021    Abnormal coronary CTA    Family history of myocardial infarction     mother under age 60's  CVA and MI    High cholesterol     Hypertension     PVC's (premature ventricular contractions)     Syncope 2012    Tubular adenoma of colon      Past Surgical History:   Procedure Laterality Date    APPENDECTOMY      SHOULDER SURGERY Right     TUBAL LIGATION       Family History   Problem Relation Age of Onset    Lung Cancer Father         lung    Breast Cancer Paternal Aunt     Cerebrovascular Disease Mother     Coronary Artery Disease Mother         Social History     Socioeconomic History    Marital status:      Spouse name: Not on file    Number of children: Not on file    Years of education: Not on file    Highest education level: Not on file   Occupational History    Not on file   Tobacco Use    Smoking status: Former     Current " packs/day: 0.00     Types: Cigarettes     Quit date: 2019     Years since quittin.4     Passive exposure: Past (Dad occasionally smoked a pipe)    Smokeless tobacco: Never   Vaping Use    Vaping status: Never Used   Substance and Sexual Activity    Alcohol use: Not on file    Drug use: Not on file    Sexual activity: Not on file   Other Topics Concern    Parent/sibling w/ CABG, MI or angioplasty before 65F 55M? Not Asked   Social History Narrative    Not on file     Social Drivers of Health     Financial Resource Strain: Not on file   Food Insecurity: Not on file   Transportation Needs: Not on file   Physical Activity: Not on file   Stress: Not on file   Social Connections: Not on file   Interpersonal Safety: Not on file   Housing Stability: Not on file           Medications  Allergies   Current Outpatient Medications   Medication Sig Dispense Refill    acetaminophen (TYLENOL) 500 MG tablet Take 500-1,000 mg by mouth every 8 hours as needed       albuterol (PROAIR HFA/PROVENTIL HFA/VENTOLIN HFA) 108 (90 Base) MCG/ACT inhaler INHALE 1 TO 2 PUFFS BY MOUTH EVERY 4 HOURS AS NEEDED FOR SHORTNESS OF BREATH      atorvastatin (LIPITOR) 40 MG tablet Take 40 mg by mouth At Bedtime      Cholecalciferol 250 MCG (04710 UT) TABS Take 10,000 Units by mouth daily      ezetimibe (ZETIA) 10 MG tablet Take 1 tablet (10 mg) by mouth daily. 90 tablet 3    famotidine (PEPCID) 20 MG tablet Take 1 tablet (20 mg) by mouth 2 times daily 180 tablet 3    fluticasone (FLONASE) 50 MCG/ACT nasal spray Spray 2 sprays in nostril as needed      Fluticasone-Umeclidin-Vilant (TRELEGY ELLIPTA) 100-62.5-25 MCG/ACT oral inhaler Inhale 1 puff into the lungs daily 180 each 3    HEMP OIL OR EXTRACT OR OTHER CBD CANNABINOID, NOT MEDICAL CANNABIS, 1 gummy at HS for sleep-delta 9      IBANdronate (BONIVA) 150 MG tablet Take 150 mg by mouth every 30 days      lisinopril (ZESTRIL) 2.5 MG tablet Take 1 tablet by mouth daily at 2 pm.      melatonin 3 MG CAPS  "Take 1 capsule by mouth At Bedtime      Multiple Vitamin (MULTIVITAMIN ADULT PO) Take 1 tablet by mouth daily      Multiple Vitamins-Minerals (PRESERVISION AREDS 2 PO) Take 1 capsule by mouth 2 times daily.      nicotine (NICORETTE) 2 MG gum Take 2 mg by mouth every hour as needed.      nitroGLYcerin (NITRODUR) 0.2 MG/HR 24 hr patch Place 1 patch onto the skin daily. ---Needs follow up with cardiology for further refills--- 30 patch 0    aspirin (ASA) 81 MG EC tablet Take 81 mg by mouth daily      hydrOXYzine HCl (ATARAX) 25 MG tablet TAKE 1 TABLET BY MOUTH EVERY 6 HOURS AS NEEDED FOR ANXIETY OR STRESS      nitroGLYcerin (NITROSTAT) 0.4 MG sublingual tablet For chest pain place 1 tablet under the tongue every 5 minutes for 3 doses. If symptoms persist 5 minutes after 1st dose call 911. (Patient taking differently: every 5 minutes as needed. For chest pain place 1 tablet under the tongue every 5 minutes for 3 doses. If symptoms persist 5 minutes after 1st dose call 911.) 25 tablet 4     No Known Allergies       Lab Results    Chemistry/lipid CBC Cardiac Enzymes/BNP/TSH/INR   Recent Labs   Lab Test 10/28/24  1359   CHOL 171   HDL 95   LDL 63   TRIG 64     Recent Labs   Lab Test 10/28/24  1359 10/19/23  1332 02/17/23  1255   LDL 63 59 80     Recent Labs   Lab Test 05/15/25  1111 05/15/25  1106 03/10/25  1421   NA  --   --  139   POTASSIUM  --   --  4.6   CHLORIDE  --   --  103   CO2  --   --  26   GLC  --  90 91   BUN  --   --  14.6   CR 0.8  --  0.77   GFRESTIMATED >60  --  78   TRACE  --   --  10.1     Recent Labs   Lab Test 05/15/25  1111 03/10/25  1421 11/18/24  1247   CR 0.8 0.77 0.72     No results for input(s): \"A1C\" in the last 53641 hours.       Recent Labs   Lab Test 03/10/25  1421   WBC 6.4   HGB 13.9   HCT 42.1   *        Recent Labs   Lab Test 03/10/25  1421 11/18/24  1247 07/18/22  1146   HGB 13.9 13.9 12.1    Recent Labs   Lab Test 08/09/22  1430 01/30/21  0602 01/30/21  0023   TROPONINI " <0.01 <0.01 <0.01     Recent Labs   Lab Test 03/10/25  1421   NTBNP 235     Recent Labs   Lab Test 03/10/25  1421   TSH 1.93     Recent Labs   Lab Test 01/29/21  1307   INR 1.00          This note has been dictated using voice recognition software. Any grammatical, typographical, or context distortions are unintentional and inherent to the software    Lisa Cash PA-C

## 2025-06-17 ENCOUNTER — OFFICE VISIT (OUTPATIENT)
Dept: CARDIOLOGY | Facility: CLINIC | Age: 80
End: 2025-06-17
Attending: INTERNAL MEDICINE
Payer: MEDICARE

## 2025-06-17 VITALS
SYSTOLIC BLOOD PRESSURE: 118 MMHG | HEART RATE: 88 BPM | BODY MASS INDEX: 25.23 KG/M2 | RESPIRATION RATE: 16 BRPM | DIASTOLIC BLOOD PRESSURE: 76 MMHG | WEIGHT: 157 LBS | HEIGHT: 66 IN

## 2025-06-17 DIAGNOSIS — R06.09 DYSPNEA ON EXERTION: ICD-10-CM

## 2025-06-17 DIAGNOSIS — E78.5 DYSLIPIDEMIA, GOAL LDL BELOW 70: ICD-10-CM

## 2025-06-17 DIAGNOSIS — I25.10 CORONARY ARTERY DISEASE DUE TO LIPID RICH PLAQUE: ICD-10-CM

## 2025-06-17 DIAGNOSIS — R42 LIGHTHEADEDNESS: Primary | ICD-10-CM

## 2025-06-17 DIAGNOSIS — J44.9 CHRONIC OBSTRUCTIVE PULMONARY DISEASE, UNSPECIFIED COPD TYPE (H): ICD-10-CM

## 2025-06-17 DIAGNOSIS — I10 HYPERTENSION, UNSPECIFIED TYPE: ICD-10-CM

## 2025-06-17 DIAGNOSIS — I25.83 CORONARY ARTERY DISEASE DUE TO LIPID RICH PLAQUE: ICD-10-CM

## 2025-06-17 PROCEDURE — 3074F SYST BP LT 130 MM HG: CPT

## 2025-06-17 PROCEDURE — 99214 OFFICE O/P EST MOD 30 MIN: CPT

## 2025-06-17 PROCEDURE — 3078F DIAST BP <80 MM HG: CPT

## 2025-06-17 PROCEDURE — G2211 COMPLEX E/M VISIT ADD ON: HCPCS

## 2025-06-17 RX ORDER — NITROGLYCERIN 40 MG/1
1 PATCH TRANSDERMAL DAILY
Qty: 30 PATCH | Refills: 11 | Status: SHIPPED | OUTPATIENT
Start: 2025-06-17

## 2025-06-17 NOTE — Clinical Note
"6/17/2025    LUCRETIA ROSARIO  E Alicia Burtjewel  [facility] [address]    RE: Shantel Romero       Dear Colleague,     I had the pleasure of seeing Shantel Romero in the [facility].    HEART CARE ENCOUNTER CONSULTATON NOTE      [facility]  [phone]      Assessment/Recommendations   Assessment:   Orthostatic blood pressure, dizziness***: No sustained arrhythmias or atrial fibrillation on ZIO monitor.  Moderate increased burden of PACs/PVCs, nonsustained SVT, 1 episode 4 beats NSVT.  No symptom correlation to nonsustained arrhythmias,***correlated to sinus rhythm with and without PACs/PVC. TSH/ddimer/CBC/CMP stable/wnl.  Coronary artery disease: Aspirin held for \"buzzing in her ear \".  CT coronary angiogram 2021 with mild nonobstructive disease of left main, LAD, left circumflex.  Nonischemic NM Lexiscan stress test 2022.  Hyperlipidemia: Controlled on Zetia and atorvastatin  Hypertension: Controlled on lisinopril 2.5 mg   Right-sided lung cancer: s/p XRT. Follows Heme/Onc Dr. Barnhart    Plan:   ***  ***  ***  ***      Follow up in ***     History of Present Illness/Subjective    HPI: Shantel Romero is a 79 year old female with PMHx of *** presents for follow up dizziness.    ***?  Orthostatic BP  Labs stable/normal    She ***.  She denies {Blank multiple:78574::\"fatigue\",\"lightheadedness\",\"shortness of breath\",\"dyspnea on exertion\",\"orthopnea\",\"PND\",\"palpitations\",\"chest pain\",\"abdominal fullness/bloating\",\"lower extremity edema\"}.  ***        Zio monitoring from 3/17/2025 to 3/24/2025 (duration 6d 17h)  Predominant rhythm was sinus rhythm, 59 to 123bpm, average 81bpm.  17 episode(s) of nonsustained supraventricular tachycardia - longest 15 beats at 107bpm, fastest 6 beats at 188bpm.  1 episode(s) of nonsustained ventricular tachycardia - 4 beats, 185bpm.  No sustained tachyarrhythmias.  No atrial fibrillation.  There were no pauses of greater than 3 " "seconds.  Occasional premature atrial contractions beats (isolated 1.3%).  Occasional premature ventricular contractions (isolated 3.0%).  Symptom triggers and diary entries (12) correlated to sinus rhythm with and without PACs, PVCs.     Echocardiogram *** Results:       Physical Examination  Review of Systems   Vitals: /76 (BP Location: Left arm, Patient Position: Sitting, Cuff Size: Adult Regular)   Pulse 88   Resp 16   Ht 1.676 m (5' 6\")   Wt 71.2 kg (157 lb)   BMI 25.34 kg/m    BMI= Body mass index is 25.34 kg/m .  Wt Readings from Last 3 Encounters:   06/17/25 71.2 kg (157 lb)   05/19/25 72.1 kg (159 lb)   03/10/25 72.1 kg (159 lb)           ENT/Mouth: membranes moist, no oral lesions or bleeding gums.      EYES:  no scleral icterus, normal conjunctivae                    Neck: No carotid bruit or thyromegaly   Chest/Lungs:   lungs are clear to auscultation, no rales or wheezing, *** sternal scar, equal chest wall expansion    Cardiovascular:   ***Regular. Normal first and second heart sounds with ***no murmurs, rubs, or gallops; the carotid, radial and posterior tibial pulses are intact, Jugular venous pressure ***, *** edema bilaterally    Abdomen:  no tenderness; bowel sounds are present   Extremities: no cyanosis or clubbing   Skin: no xanthelasma, warm.    Neurologic: normal  bilateral, no tremors     Psychiatric: alert and oriented x3, calm        Please refer above for cardiac ROS details.        Medical History  Surgical History Family History Social History   Past Medical History:   Diagnosis Date    COPD (chronic obstructive pulmonary disease) (H)     Coronary artery disease due to lipid rich plaque 02/2021    Abnormal coronary CTA    Family history of myocardial infarction     mother under age 60's  CVA and MI    High cholesterol     Hypertension     PVC's (premature ventricular contractions)     Syncope 2012    Tubular adenoma of colon      Past Surgical History:   Procedure " Laterality Date    APPENDECTOMY      SHOULDER SURGERY Right     TUBAL LIGATION       Family History   Problem Relation Age of Onset    Lung Cancer Father         lung    Breast Cancer Paternal Aunt     Cerebrovascular Disease Mother     Coronary Artery Disease Mother         Social History     Socioeconomic History    Marital status:      Spouse name: Not on file    Number of children: Not on file    Years of education: Not on file    Highest education level: Not on file   Occupational History    Not on file   Tobacco Use    Smoking status: Former     Current packs/day: 0.00     Types: Cigarettes     Quit date: 2019     Years since quittin.4     Passive exposure: Past (Dad occasionally smoked a pipe)    Smokeless tobacco: Never   Vaping Use    Vaping status: Never Used   Substance and Sexual Activity    Alcohol use: Not on file    Drug use: Not on file    Sexual activity: Not on file   Other Topics Concern    Parent/sibling w/ CABG, MI or angioplasty before 65F 55M? Not Asked   Social History Narrative    Not on file     Social Drivers of Health     Financial Resource Strain: Not on file   Food Insecurity: Not on file   Transportation Needs: Not on file   Physical Activity: Not on file   Stress: Not on file   Social Connections: Not on file   Interpersonal Safety: Not on file   Housing Stability: Not on file           Medications  Allergies   Current Outpatient Medications   Medication Sig Dispense Refill    acetaminophen (TYLENOL) 500 MG tablet Take 500-1,000 mg by mouth every 8 hours as needed       albuterol (PROAIR HFA/PROVENTIL HFA/VENTOLIN HFA) 108 (90 Base) MCG/ACT inhaler INHALE 1 TO 2 PUFFS BY MOUTH EVERY 4 HOURS AS NEEDED FOR SHORTNESS OF BREATH      atorvastatin (LIPITOR) 40 MG tablet Take 40 mg by mouth At Bedtime      Cholecalciferol 250 MCG (09974 UT) TABS Take 10,000 Units by mouth daily      ezetimibe (ZETIA) 10 MG tablet Take 1 tablet (10 mg) by mouth daily. 90 tablet 3    famotidine  (PEPCID) 20 MG tablet Take 1 tablet (20 mg) by mouth 2 times daily 180 tablet 3    fluticasone (FLONASE) 50 MCG/ACT nasal spray Spray 2 sprays in nostril as needed      Fluticasone-Umeclidin-Vilant (TRELEGY ELLIPTA) 100-62.5-25 MCG/ACT oral inhaler Inhale 1 puff into the lungs daily 180 each 3    HEMP OIL OR EXTRACT OR OTHER CBD CANNABINOID, NOT MEDICAL CANNABIS, 1 gummy at HS for sleep-delta 9      IBANdronate (BONIVA) 150 MG tablet Take 150 mg by mouth every 30 days      lisinopril (ZESTRIL) 2.5 MG tablet Take 1 tablet by mouth daily at 2 pm.      melatonin 3 MG CAPS Take 1 capsule by mouth At Bedtime      Multiple Vitamin (MULTIVITAMIN ADULT PO) Take 1 tablet by mouth daily      Multiple Vitamins-Minerals (PRESERVISION AREDS 2 PO) Take 1 capsule by mouth 2 times daily.      nicotine (NICORETTE) 2 MG gum Take 2 mg by mouth every hour as needed.      nitroGLYcerin (NITRODUR) 0.2 MG/HR 24 hr patch Place 1 patch onto the skin daily. ---Needs follow up with cardiology for further refills--- 30 patch 0    aspirin (ASA) 81 MG EC tablet Take 81 mg by mouth daily      hydrOXYzine HCl (ATARAX) 25 MG tablet TAKE 1 TABLET BY MOUTH EVERY 6 HOURS AS NEEDED FOR ANXIETY OR STRESS      nitroGLYcerin (NITROSTAT) 0.4 MG sublingual tablet For chest pain place 1 tablet under the tongue every 5 minutes for 3 doses. If symptoms persist 5 minutes after 1st dose call 911. (Patient taking differently: every 5 minutes as needed. For chest pain place 1 tablet under the tongue every 5 minutes for 3 doses. If symptoms persist 5 minutes after 1st dose call 911.) 25 tablet 4     No Known Allergies       Lab Results    Chemistry/lipid CBC Cardiac Enzymes/BNP/TSH/INR   Recent Labs   Lab Test 10/28/24  1359   CHOL 171   HDL 95   LDL 63   TRIG 64     Recent Labs   Lab Test 10/28/24  1359 10/19/23  1332 02/17/23  1255   LDL 63 59 80     Recent Labs   Lab Test 05/15/25  1111 05/15/25  1106 03/10/25  1421   NA  --   --  139   POTASSIUM  --   --  4.6  "  CHLORIDE  --   --  103   CO2  --   --  26   GLC  --  90 91   BUN  --   --  14.6   CR 0.8  --  0.77   GFRESTIMATED >60  --  78   TRACE  --   --  10.1     Recent Labs   Lab Test 05/15/25  1111 03/10/25  1421 11/18/24  1247   CR 0.8 0.77 0.72     No results for input(s): \"A1C\" in the last 46491 hours.       Recent Labs   Lab Test 03/10/25  1421   WBC 6.4   HGB 13.9   HCT 42.1   *        Recent Labs   Lab Test 03/10/25  1421 11/18/24  1247 07/18/22  1146   HGB 13.9 13.9 12.1    Recent Labs   Lab Test 08/09/22  1430 01/30/21  0602 01/30/21  0023   TROPONINI <0.01 <0.01 <0.01     Recent Labs   Lab Test 03/10/25  1421   NTBNP 235     Recent Labs   Lab Test 03/10/25  1421   TSH 1.93     Recent Labs   Lab Test 01/29/21  1307   INR 1.00          This note has been dictated using voice recognition software. Any grammatical, typographical, or context distortions are unintentional and inherent to the software    Lisa Cash PA-C                                         HEART CARE ENCOUNTER CONSULTATON NOTE      [facility]  [phone]      Assessment/Recommendations   Assessment:   Orthostatic blood pressure, dizziness***: No sustained arrhythmias or atrial fibrillation on ZIO monitor.  Moderate increased burden of PACs/PVCs, nonsustained SVT, 1 episode 4 beats NSVT.  No symptom correlation to nonsustained arrhythmias,***correlated to sinus rhythm with and without PACs/PVC. TSH/ddimer/CBC/CMP stable/wnl.  Coronary artery disease: Aspirin held for \"buzzing in her ear \".  CT coronary angiogram 2021 with mild nonobstructive disease of left main, LAD, left circumflex.  Nonischemic NM Lexiscan stress test 2022.  Hyperlipidemia: Controlled on Zetia and atorvastatin  Hypertension: Controlled on lisinopril 2.5 mg   Right-sided lung cancer: s/p XRT. Follows Heme/Onc Dr. Barnhart      Plan:   ***stop lisinopril   ***  ***  ***      Follow up in ***     History of Present Illness/Subjective    HPI: " "Shantel Romero is a 79 year old female with PMHx of *** presents for follow up dizziness.    Didn't go away     Years not getting owrse, just happens, sometimes with standing   ***?  Orthostatic BP  Labs stable/normal    No symptoms - long and hard heart beat always in bed almost asleep   Minutes    In the last weeks smoke and humidity     No daily       She ***.  She denies {Blank multiple:36970::\"fatigue\",\"lightheadedness\",\"shortness of breath\",\"dyspnea on exertion\",\"orthopnea\",\"PND\",\"palpitations\",\"chest pain\",\"abdominal fullness/bloating\",\"lower extremity edema\"}.  ***        Zio monitoring from 3/17/2025 to 3/24/2025 (duration 6d 17h)  Predominant rhythm was sinus rhythm, 59 to 123bpm, average 81bpm.  17 episode(s) of nonsustained supraventricular tachycardia - longest 15 beats at 107bpm, fastest 6 beats at 188bpm.  1 episode(s) of nonsustained ventricular tachycardia - 4 beats, 185bpm.  No sustained tachyarrhythmias.  No atrial fibrillation.  There were no pauses of greater than 3 seconds.  Occasional premature atrial contractions beats (isolated 1.3%).  Occasional premature ventricular contractions (isolated 3.0%).  Symptom triggers and diary entries (12) correlated to sinus rhythm with and without PACs, PVCs.     Echocardiogram 1/2021 Results:    No previous study for comparison.    Normal left ventricular size. Mild left ventricular hypertrophy    Left ventricle ejection fraction is normal. The calculated left ventricular ejection fraction is 67%.    Possible mild right ventricular enlargement.    Normal right ventricular systolic function.    Mild right atrial enlargement.    Aortic valve sclerosis without evidence for significant aortic stenosis. Mild aortic insufficiency.    Trace pericardial effusion suggested        Physical Examination  Review of Systems   Vitals: /76 (BP Location: Left arm, Patient Position: Sitting, Cuff Size: Adult Regular)   Pulse 88   Resp 16   Ht 1.676 m (5' 6\")   " Wt 71.2 kg (157 lb)   BMI 25.34 kg/m    BMI= Body mass index is 25.34 kg/m .  Wt Readings from Last 3 Encounters:   06/17/25 71.2 kg (157 lb)   05/19/25 72.1 kg (159 lb)   03/10/25 72.1 kg (159 lb)           ENT/Mouth: membranes moist, no oral lesions or bleeding gums.      EYES:  no scleral icterus, normal conjunctivae                    Neck: No carotid bruit or thyromegaly   Chest/Lungs:   lungs are clear to auscultation, no rales or wheezing, equal chest wall expansion    Cardiovascular:   ***Regular. Normal first and second heart sounds with ***no murmurs, rubs, or gallops; the carotid, radial and posterior tibial pulses are intact, Jugular venous pressure ***, *** edema bilaterally    Abdomen:  no tenderness; bowel sounds are present   Extremities: no cyanosis or clubbing   Skin: no xanthelasma, warm.    Neurologic: normal  bilateral, no tremors     Psychiatric: alert and oriented x3, calm        Please refer above for cardiac ROS details.        Medical History  Surgical History Family History Social History   Past Medical History:   Diagnosis Date     COPD (chronic obstructive pulmonary disease) (H)      Coronary artery disease due to lipid rich plaque 02/2021    Abnormal coronary CTA     Family history of myocardial infarction     mother under age 60's  CVA and MI     High cholesterol      Hypertension      PVC's (premature ventricular contractions)      Syncope 2012     Tubular adenoma of colon      Past Surgical History:   Procedure Laterality Date     APPENDECTOMY       SHOULDER SURGERY Right      TUBAL LIGATION       Family History   Problem Relation Age of Onset     Lung Cancer Father         lung     Breast Cancer Paternal Aunt      Cerebrovascular Disease Mother      Coronary Artery Disease Mother         Social History     Socioeconomic History     Marital status:      Spouse name: Not on file     Number of children: Not on file     Years of education: Not on file     Highest education  level: Not on file   Occupational History     Not on file   Tobacco Use     Smoking status: Former     Current packs/day: 0.00     Types: Cigarettes     Quit date: 2019     Years since quittin.4     Passive exposure: Past (Dad occasionally smoked a pipe)     Smokeless tobacco: Never   Vaping Use     Vaping status: Never Used   Substance and Sexual Activity     Alcohol use: Not on file     Drug use: Not on file     Sexual activity: Not on file   Other Topics Concern     Parent/sibling w/ CABG, MI or angioplasty before 65F 55M? Not Asked   Social History Narrative     Not on file     Social Drivers of Health     Financial Resource Strain: Not on file   Food Insecurity: Not on file   Transportation Needs: Not on file   Physical Activity: Not on file   Stress: Not on file   Social Connections: Not on file   Interpersonal Safety: Not on file   Housing Stability: Not on file           Medications  Allergies   Current Outpatient Medications   Medication Sig Dispense Refill     acetaminophen (TYLENOL) 500 MG tablet Take 500-1,000 mg by mouth every 8 hours as needed        albuterol (PROAIR HFA/PROVENTIL HFA/VENTOLIN HFA) 108 (90 Base) MCG/ACT inhaler INHALE 1 TO 2 PUFFS BY MOUTH EVERY 4 HOURS AS NEEDED FOR SHORTNESS OF BREATH       atorvastatin (LIPITOR) 40 MG tablet Take 40 mg by mouth At Bedtime       Cholecalciferol 250 MCG (14957 UT) TABS Take 10,000 Units by mouth daily       ezetimibe (ZETIA) 10 MG tablet Take 1 tablet (10 mg) by mouth daily. 90 tablet 3     famotidine (PEPCID) 20 MG tablet Take 1 tablet (20 mg) by mouth 2 times daily 180 tablet 3     fluticasone (FLONASE) 50 MCG/ACT nasal spray Spray 2 sprays in nostril as needed       Fluticasone-Umeclidin-Vilant (TRELEGY ELLIPTA) 100-62.5-25 MCG/ACT oral inhaler Inhale 1 puff into the lungs daily 180 each 3     HEMP OIL OR EXTRACT OR OTHER CBD CANNABINOID, NOT MEDICAL CANNABIS, 1 gummy at HS for sleep-delta 9       IBANdronate (BONIVA) 150 MG tablet Take 150 mg  "by mouth every 30 days       lisinopril (ZESTRIL) 2.5 MG tablet Take 1 tablet by mouth daily at 2 pm.       melatonin 3 MG CAPS Take 1 capsule by mouth At Bedtime       Multiple Vitamin (MULTIVITAMIN ADULT PO) Take 1 tablet by mouth daily       Multiple Vitamins-Minerals (PRESERVISION AREDS 2 PO) Take 1 capsule by mouth 2 times daily.       nicotine (NICORETTE) 2 MG gum Take 2 mg by mouth every hour as needed.       nitroGLYcerin (NITRODUR) 0.2 MG/HR 24 hr patch Place 1 patch onto the skin daily. ---Needs follow up with cardiology for further refills--- 30 patch 0     aspirin (ASA) 81 MG EC tablet Take 81 mg by mouth daily       hydrOXYzine HCl (ATARAX) 25 MG tablet TAKE 1 TABLET BY MOUTH EVERY 6 HOURS AS NEEDED FOR ANXIETY OR STRESS       nitroGLYcerin (NITROSTAT) 0.4 MG sublingual tablet For chest pain place 1 tablet under the tongue every 5 minutes for 3 doses. If symptoms persist 5 minutes after 1st dose call 911. (Patient taking differently: every 5 minutes as needed. For chest pain place 1 tablet under the tongue every 5 minutes for 3 doses. If symptoms persist 5 minutes after 1st dose call 911.) 25 tablet 4     No Known Allergies       Lab Results    Chemistry/lipid CBC Cardiac Enzymes/BNP/TSH/INR   Recent Labs   Lab Test 10/28/24  1359   CHOL 171   HDL 95   LDL 63   TRIG 64     Recent Labs   Lab Test 10/28/24  1359 10/19/23  1332 02/17/23  1255   LDL 63 59 80     Recent Labs   Lab Test 05/15/25  1111 05/15/25  1106 03/10/25  1421   NA  --   --  139   POTASSIUM  --   --  4.6   CHLORIDE  --   --  103   CO2  --   --  26   GLC  --  90 91   BUN  --   --  14.6   CR 0.8  --  0.77   GFRESTIMATED >60  --  78   TRACE  --   --  10.1     Recent Labs   Lab Test 05/15/25  1111 03/10/25  1421 11/18/24  1247   CR 0.8 0.77 0.72     No results for input(s): \"A1C\" in the last 31312 hours.       Recent Labs   Lab Test 03/10/25  1421   WBC 6.4   HGB 13.9   HCT 42.1   *        Recent Labs   Lab Test 03/10/25  1421 " 11/18/24  1247 07/18/22  1146   HGB 13.9 13.9 12.1    Recent Labs   Lab Test 08/09/22  1430 01/30/21  0602 01/30/21  0023   TROPONINI <0.01 <0.01 <0.01     Recent Labs   Lab Test 03/10/25  1421   NTBNP 235     Recent Labs   Lab Test 03/10/25  1421   TSH 1.93     Recent Labs   Lab Test 01/29/21  1307   INR 1.00          This note has been dictated using voice recognition software. Any grammatical, typographical, or context distortions are unintentional and inherent to the software    Lisa Cash PA-C                                         Thank you for allowing me to participate in the care of your patient.      Sincerely,     Lisa Cash PA-C     [facility]  cc:   Elliot Cabrera MD  [address] [place] [address]

## 2025-06-17 NOTE — PATIENT INSTRUCTIONS
It was a pleasure taking part in your care today:    - Restart aspirin 81 mg once daily   - Stop lisinopril - monitor for improvement in lightheadedness/wooziness  - Reduce caffeine use, stay hydrated at least 60 oz daily  - Watch the blood pressure once   - Use inhaler as needed with attachment, discuss inhaler management with lung doctor   - Follow up in 2-3 months with me     Please call the [facility] clinic with any questions or concerns at [phone].     Lisa Chatterjee PA-C

## 2025-08-13 ENCOUNTER — OFFICE VISIT (OUTPATIENT)
Dept: PULMONOLOGY | Facility: CLINIC | Age: 80
End: 2025-08-13
Attending: NURSE PRACTITIONER
Payer: MEDICARE

## 2025-08-13 VITALS
DIASTOLIC BLOOD PRESSURE: 84 MMHG | OXYGEN SATURATION: 96 % | BODY MASS INDEX: 25.82 KG/M2 | HEART RATE: 82 BPM | SYSTOLIC BLOOD PRESSURE: 149 MMHG | WEIGHT: 160 LBS

## 2025-08-13 DIAGNOSIS — J44.9 CHRONIC OBSTRUCTIVE PULMONARY DISEASE, UNSPECIFIED COPD TYPE (H): Primary | ICD-10-CM

## 2025-08-13 DIAGNOSIS — R06.09 DYSPNEA ON EXERTION: ICD-10-CM

## 2025-08-13 PROCEDURE — 99214 OFFICE O/P EST MOD 30 MIN: CPT | Performed by: NURSE PRACTITIONER

## 2025-08-13 PROCEDURE — 3079F DIAST BP 80-89 MM HG: CPT | Performed by: NURSE PRACTITIONER

## 2025-08-13 PROCEDURE — 3077F SYST BP >= 140 MM HG: CPT | Performed by: NURSE PRACTITIONER

## 2025-08-13 RX ORDER — ALBUTEROL SULFATE 90 UG/1
1-2 INHALANT RESPIRATORY (INHALATION) EVERY 4 HOURS PRN
Qty: 18 G | Refills: 4 | Status: SHIPPED | OUTPATIENT
Start: 2025-08-13

## 2025-08-13 RX ORDER — FLUTICASONE FUROATE, UMECLIDINIUM BROMIDE AND VILANTEROL TRIFENATATE 100; 62.5; 25 UG/1; UG/1; UG/1
1 POWDER RESPIRATORY (INHALATION) DAILY
Qty: 180 EACH | Refills: 3 | Status: SHIPPED | OUTPATIENT
Start: 2025-08-13 | End: 2025-08-13

## 2025-08-13 RX ORDER — LISINOPRIL 2.5 MG/1
1 TABLET ORAL DAILY
COMMUNITY
Start: 2025-07-14

## 2025-08-13 RX ORDER — FLUTICASONE FUROATE, UMECLIDINIUM BROMIDE AND VILANTEROL TRIFENATATE 100; 62.5; 25 UG/1; UG/1; UG/1
1 POWDER RESPIRATORY (INHALATION) DAILY
Qty: 180 EACH | Refills: 3 | Status: SHIPPED | OUTPATIENT
Start: 2025-08-13